# Patient Record
Sex: FEMALE | Race: WHITE | NOT HISPANIC OR LATINO | Employment: FULL TIME | URBAN - NONMETROPOLITAN AREA
[De-identification: names, ages, dates, MRNs, and addresses within clinical notes are randomized per-mention and may not be internally consistent; named-entity substitution may affect disease eponyms.]

---

## 2017-01-04 ENCOUNTER — APPOINTMENT (OUTPATIENT)
Dept: LAB | Facility: MEDICAL CENTER | Age: 32
End: 2017-01-04
Payer: COMMERCIAL

## 2017-01-04 ENCOUNTER — TRANSCRIBE ORDERS (OUTPATIENT)
Dept: LAB | Facility: MEDICAL CENTER | Age: 32
End: 2017-01-04

## 2017-01-04 ENCOUNTER — HOSPITAL ENCOUNTER (OUTPATIENT)
Dept: RADIOLOGY | Facility: MEDICAL CENTER | Age: 32
Discharge: HOME | End: 2017-01-04
Payer: COMMERCIAL

## 2017-01-04 ENCOUNTER — TRANSCRIBE ORDERS (OUTPATIENT)
Dept: RADIOLOGY | Facility: MEDICAL CENTER | Age: 32
End: 2017-01-04

## 2017-01-04 DIAGNOSIS — E11.21 DIABETIC GLOMERULOPATHY (HCC): ICD-10-CM

## 2017-01-04 DIAGNOSIS — R10.13 DYSPEPSIA: ICD-10-CM

## 2017-01-04 DIAGNOSIS — E78.5 HYPERLIPIDEMIA, UNSPECIFIED HYPERLIPIDEMIA TYPE: ICD-10-CM

## 2017-01-04 DIAGNOSIS — Z98.890 HISTORY OF ESOPHAGOGASTRODUODENOSCOPY (EGD): Primary | ICD-10-CM

## 2017-01-04 DIAGNOSIS — K75.81 NONALCOHOLIC STEATOHEPATITIS (NASH): ICD-10-CM

## 2017-01-04 DIAGNOSIS — G43.909 MIGRAINE WITHOUT STATUS MIGRAINOSUS, NOT INTRACTABLE: ICD-10-CM

## 2017-01-04 DIAGNOSIS — D35.2 BENIGN NEOPLASM OF PITUITARY GLAND AND CRANIOPHARYNGEAL DUCT (POUCH) (HCC): ICD-10-CM

## 2017-01-04 DIAGNOSIS — D35.3 BENIGN NEOPLASM OF PITUITARY GLAND AND CRANIOPHARYNGEAL DUCT (POUCH) (HCC): ICD-10-CM

## 2017-01-04 DIAGNOSIS — Z79.899 OTHER LONG TERM (CURRENT) DRUG THERAPY: ICD-10-CM

## 2017-01-04 DIAGNOSIS — R10.13 DYSPEPSIA: Primary | ICD-10-CM

## 2017-01-04 DIAGNOSIS — E66.9 OBESITY: ICD-10-CM

## 2017-01-04 DIAGNOSIS — Z98.890 HISTORY OF ESOPHAGOGASTRODUODENOSCOPY (EGD): ICD-10-CM

## 2017-01-04 DIAGNOSIS — Z79.1 LONG TERM CURRENT USE OF NON-STEROIDAL ANTI-INFLAMMATORIES (NSAID): ICD-10-CM

## 2017-01-04 DIAGNOSIS — E03.9 HYPOTHYROIDISM: ICD-10-CM

## 2017-01-04 DIAGNOSIS — F33.2 MAJOR DEPRESSIVE DISORDER, RECURRENT SEVERE WITHOUT PSYCHOTIC FEATURES (HCC): ICD-10-CM

## 2017-01-04 DIAGNOSIS — M06.09 RHEUMATOID ARTHRITIS OF MULTIPLE SITES WITHOUT RHEUMATOID FACTOR (HCC): ICD-10-CM

## 2017-01-04 LAB
ALBUMIN SERPL BCP-MCNC: 3.9 G/DL (ref 3.5–5)
ALP SERPL-CCNC: 87 U/L (ref 46–116)
ALT SERPL W P-5'-P-CCNC: 296 U/L (ref 12–78)
ANION GAP SERPL CALCULATED.3IONS-SCNC: 8 MMOL/L (ref 4–13)
APTT PPP: 27 SECONDS (ref 24–36)
AST SERPL W P-5'-P-CCNC: 227 U/L (ref 5–45)
BASOPHILS # BLD AUTO: 0.04 THOUSANDS/ΜL (ref 0–0.1)
BASOPHILS NFR BLD AUTO: 0 % (ref 0–1)
BILIRUB SERPL-MCNC: 0.58 MG/DL (ref 0.2–1)
BUN SERPL-MCNC: 6 MG/DL (ref 5–25)
CALCIUM SERPL-MCNC: 9.7 MG/DL (ref 8.3–10.1)
CHLORIDE SERPL-SCNC: 105 MMOL/L (ref 100–108)
CHOLEST SERPL-MCNC: 258 MG/DL (ref 50–200)
CO2 SERPL-SCNC: 25 MMOL/L (ref 21–32)
CREAT SERPL-MCNC: 0.97 MG/DL (ref 0.6–1.3)
CREAT UR-MCNC: 308 MG/DL
CRP SERPL QL: 9.9 MG/L
EOSINOPHIL # BLD AUTO: 0.29 THOUSAND/ΜL (ref 0–0.61)
EOSINOPHIL NFR BLD AUTO: 3 % (ref 0–6)
ERYTHROCYTE [DISTWIDTH] IN BLOOD BY AUTOMATED COUNT: 13.5 % (ref 11.6–15.1)
ERYTHROCYTE [SEDIMENTATION RATE] IN BLOOD: 16 MM/HOUR (ref 0–20)
EST. AVERAGE GLUCOSE BLD GHB EST-MCNC: 120 MG/DL
GFR SERPL CREATININE-BSD FRML MDRD: >60 ML/MIN/1.73SQ M
GLUCOSE SERPL-MCNC: 122 MG/DL (ref 65–140)
HBA1C MFR BLD: 5.8 % (ref 4.2–6.3)
HCG SERPL QL: NEGATIVE
HCT VFR BLD AUTO: 46.6 % (ref 34.8–46.1)
HDLC SERPL-MCNC: 26 MG/DL (ref 40–60)
HGB BLD-MCNC: 15.8 G/DL (ref 11.5–15.4)
INR PPP: 1.03 (ref 0.86–1.16)
LDLC SERPL CALC-MCNC: 154 MG/DL (ref 0–100)
LYMPHOCYTES # BLD AUTO: 4.43 THOUSANDS/ΜL (ref 0.6–4.47)
LYMPHOCYTES NFR BLD AUTO: 38 % (ref 14–44)
MCH RBC QN AUTO: 31.3 PG (ref 26.8–34.3)
MCHC RBC AUTO-ENTMCNC: 33.9 G/DL (ref 31.4–37.4)
MCV RBC AUTO: 92 FL (ref 82–98)
MICROALBUMIN UR-MCNC: 15.1 MG/L (ref 0–20)
MICROALBUMIN/CREAT 24H UR: 5 MG/G CREATININE (ref 0–30)
MONOCYTES # BLD AUTO: 0.99 THOUSAND/ΜL (ref 0.17–1.22)
MONOCYTES NFR BLD AUTO: 9 % (ref 4–12)
NEUTROPHILS # BLD AUTO: 5.86 THOUSANDS/ΜL (ref 1.85–7.62)
NEUTS SEG NFR BLD AUTO: 50 % (ref 43–75)
NRBC BLD AUTO-RTO: 0 /100 WBCS
PLATELET # BLD AUTO: 290 THOUSANDS/UL (ref 149–390)
PMV BLD AUTO: 11.7 FL (ref 8.9–12.7)
POTASSIUM SERPL-SCNC: 4.3 MMOL/L (ref 3.5–5.3)
PROLACTIN SERPL-MCNC: 21.4 NG/ML
PROT SERPL-MCNC: 8.2 G/DL (ref 6.4–8.2)
PROTHROMBIN TIME: 13.6 SECONDS (ref 12–14.3)
RBC # BLD AUTO: 5.05 MILLION/UL (ref 3.81–5.12)
SODIUM SERPL-SCNC: 138 MMOL/L (ref 136–145)
TRIGL SERPL-MCNC: 392 MG/DL
TSH SERPL DL<=0.05 MIU/L-ACNC: 3.79 UIU/ML (ref 0.36–3.74)
WBC # BLD AUTO: 11.66 THOUSAND/UL (ref 4.31–10.16)

## 2017-01-04 PROCEDURE — 80053 COMPREHEN METABOLIC PANEL: CPT

## 2017-01-04 PROCEDURE — 85652 RBC SED RATE AUTOMATED: CPT

## 2017-01-04 PROCEDURE — 82043 UR ALBUMIN QUANTITATIVE: CPT | Performed by: SURGERY

## 2017-01-04 PROCEDURE — 36415 COLL VENOUS BLD VENIPUNCTURE: CPT

## 2017-01-04 PROCEDURE — 83036 HEMOGLOBIN GLYCOSYLATED A1C: CPT

## 2017-01-04 PROCEDURE — 84443 ASSAY THYROID STIM HORMONE: CPT

## 2017-01-04 PROCEDURE — 84703 CHORIONIC GONADOTROPIN ASSAY: CPT

## 2017-01-04 PROCEDURE — 85610 PROTHROMBIN TIME: CPT

## 2017-01-04 PROCEDURE — 82570 ASSAY OF URINE CREATININE: CPT | Performed by: SURGERY

## 2017-01-04 PROCEDURE — 86140 C-REACTIVE PROTEIN: CPT

## 2017-01-04 PROCEDURE — 71020 HB CHEST X-RAY 2VW FRONTAL&LATL: CPT

## 2017-01-04 PROCEDURE — 85025 COMPLETE CBC W/AUTO DIFF WBC: CPT

## 2017-01-04 PROCEDURE — 84146 ASSAY OF PROLACTIN: CPT

## 2017-01-04 PROCEDURE — 80061 LIPID PANEL: CPT

## 2017-01-04 PROCEDURE — 85730 THROMBOPLASTIN TIME PARTIAL: CPT

## 2017-01-06 ENCOUNTER — HOSPITAL ENCOUNTER (OUTPATIENT)
Dept: ULTRASOUND IMAGING | Facility: HOSPITAL | Age: 32
Discharge: HOME | End: 2017-01-06
Payer: COMMERCIAL

## 2017-01-06 ENCOUNTER — TRANSCRIBE ORDERS (OUTPATIENT)
Dept: ADMINISTRATIVE | Facility: HOSPITAL | Age: 32
End: 2017-01-06

## 2017-01-06 ENCOUNTER — HOSPITAL ENCOUNTER (OUTPATIENT)
Dept: NON INVASIVE DIAGNOSTICS | Facility: HOSPITAL | Age: 32
Discharge: HOME | End: 2017-01-06
Payer: COMMERCIAL

## 2017-01-06 DIAGNOSIS — R10.13 EPIGASTRIC PAIN: ICD-10-CM

## 2017-01-06 DIAGNOSIS — Z01.818 PREOP EXAMINATION: Primary | ICD-10-CM

## 2017-01-06 DIAGNOSIS — Z01.818 PREOP EXAMINATION: ICD-10-CM

## 2017-01-06 LAB
ATRIAL RATE: 106 BPM
P AXIS: 51 DEGREES
PR INTERVAL: 142 MS
QRS AXIS: 51 DEGREES
QRSD INTERVAL: 86 MS
QT INTERVAL: 362 MS
QTC INTERVAL: 480 MS
T WAVE AXIS: 14 DEGREES
VENTRICULAR RATE: 106 BPM

## 2017-01-06 PROCEDURE — 76705 ECHO EXAM OF ABDOMEN: CPT

## 2017-01-06 PROCEDURE — 93005 ELECTROCARDIOGRAM TRACING: CPT

## 2017-01-13 ENCOUNTER — ALLSCRIPTS OFFICE VISIT (OUTPATIENT)
Dept: OTHER | Facility: OTHER | Age: 32
End: 2017-01-13

## 2017-01-18 ENCOUNTER — ALLSCRIPTS OFFICE VISIT (OUTPATIENT)
Dept: FAMILY MEDICINE CLINIC | Facility: CLINIC | Age: 32
End: 2017-01-18
Payer: COMMERCIAL

## 2017-01-18 PROCEDURE — 90791 PSYCH DIAGNOSTIC EVALUATION: CPT | Performed by: SOCIAL WORKER

## 2017-03-15 ENCOUNTER — ALLSCRIPTS OFFICE VISIT (OUTPATIENT)
Dept: FAMILY MEDICINE CLINIC | Facility: CLINIC | Age: 32
End: 2017-03-15
Payer: COMMERCIAL

## 2017-03-15 PROCEDURE — 90834 PSYTX W PT 45 MINUTES: CPT | Performed by: SOCIAL WORKER

## 2017-03-17 ENCOUNTER — APPOINTMENT (OUTPATIENT)
Dept: LAB | Facility: HOSPITAL | Age: 32
End: 2017-03-17
Payer: COMMERCIAL

## 2017-03-17 ENCOUNTER — TRANSCRIBE ORDERS (OUTPATIENT)
Dept: ADMINISTRATIVE | Facility: HOSPITAL | Age: 32
End: 2017-03-17

## 2017-03-17 DIAGNOSIS — Z01.818 OTHER SPECIFIED PRE-OPERATIVE EXAMINATION: ICD-10-CM

## 2017-03-17 DIAGNOSIS — E66.01 MORBID OBESITY, UNSPECIFIED OBESITY TYPE (HCC): ICD-10-CM

## 2017-03-17 DIAGNOSIS — E66.01 MORBID OBESITY, UNSPECIFIED OBESITY TYPE (HCC): Primary | ICD-10-CM

## 2017-03-17 LAB
ALBUMIN SERPL BCP-MCNC: 4.2 G/DL (ref 3.5–5)
ALP SERPL-CCNC: 67 U/L (ref 46–116)
ALT SERPL W P-5'-P-CCNC: 45 U/L (ref 12–78)
ANION GAP SERPL CALCULATED.3IONS-SCNC: 11 MMOL/L (ref 4–13)
APTT PPP: 31 SECONDS (ref 24–36)
AST SERPL W P-5'-P-CCNC: 19 U/L (ref 5–45)
B-HCG SERPL-ACNC: <2 MIU/ML
BASOPHILS # BLD AUTO: 0.03 THOUSANDS/ΜL (ref 0–0.1)
BASOPHILS NFR BLD AUTO: 0 % (ref 0–1)
BILIRUB SERPL-MCNC: 0.7 MG/DL (ref 0.2–1)
BUN SERPL-MCNC: 9 MG/DL (ref 5–25)
CALCIUM SERPL-MCNC: 9.3 MG/DL (ref 8.3–10.1)
CHLORIDE SERPL-SCNC: 101 MMOL/L (ref 100–108)
CO2 SERPL-SCNC: 26 MMOL/L (ref 21–32)
CREAT SERPL-MCNC: 1.07 MG/DL (ref 0.6–1.3)
EOSINOPHIL # BLD AUTO: 0.17 THOUSAND/ΜL (ref 0–0.61)
EOSINOPHIL NFR BLD AUTO: 1 % (ref 0–6)
ERYTHROCYTE [DISTWIDTH] IN BLOOD BY AUTOMATED COUNT: 13.3 % (ref 11.6–15.1)
GFR SERPL CREATININE-BSD FRML MDRD: 59.4 ML/MIN/1.73SQ M
GLUCOSE P FAST SERPL-MCNC: 105 MG/DL (ref 65–99)
HCT VFR BLD AUTO: 44.3 % (ref 34.8–46.1)
HGB BLD-MCNC: 14.8 G/DL (ref 11.5–15.4)
INR PPP: 1.17 (ref 0.86–1.16)
LYMPHOCYTES # BLD AUTO: 4.42 THOUSANDS/ΜL (ref 0.6–4.47)
LYMPHOCYTES NFR BLD AUTO: 37 % (ref 14–44)
MCH RBC QN AUTO: 30.5 PG (ref 26.8–34.3)
MCHC RBC AUTO-ENTMCNC: 33.4 G/DL (ref 31.4–37.4)
MCV RBC AUTO: 91 FL (ref 82–98)
MONOCYTES # BLD AUTO: 1.06 THOUSAND/ΜL (ref 0.17–1.22)
MONOCYTES NFR BLD AUTO: 9 % (ref 4–12)
NEUTROPHILS # BLD AUTO: 6.33 THOUSANDS/ΜL (ref 1.85–7.62)
NEUTS SEG NFR BLD AUTO: 53 % (ref 43–75)
PLATELET # BLD AUTO: 304 THOUSANDS/UL (ref 149–390)
PMV BLD AUTO: 10.9 FL (ref 8.9–12.7)
POTASSIUM SERPL-SCNC: 4 MMOL/L (ref 3.5–5.3)
PROT SERPL-MCNC: 8.1 G/DL (ref 6.4–8.2)
PROTHROMBIN TIME: 14.5 SECONDS (ref 12–14.3)
RBC # BLD AUTO: 4.86 MILLION/UL (ref 3.81–5.12)
SODIUM SERPL-SCNC: 138 MMOL/L (ref 136–145)
WBC # BLD AUTO: 12.01 THOUSAND/UL (ref 4.31–10.16)

## 2017-03-17 PROCEDURE — 36415 COLL VENOUS BLD VENIPUNCTURE: CPT

## 2017-03-17 PROCEDURE — 85730 THROMBOPLASTIN TIME PARTIAL: CPT

## 2017-03-17 PROCEDURE — 85025 COMPLETE CBC W/AUTO DIFF WBC: CPT

## 2017-03-17 PROCEDURE — 85610 PROTHROMBIN TIME: CPT

## 2017-03-17 PROCEDURE — 84702 CHORIONIC GONADOTROPIN TEST: CPT

## 2017-03-17 PROCEDURE — 80053 COMPREHEN METABOLIC PANEL: CPT

## 2017-03-30 DIAGNOSIS — Z79.1 LONG TERM CURRENT USE OF NON-STEROIDAL ANTI-INFLAMMATORIES (NSAID): ICD-10-CM

## 2017-03-30 DIAGNOSIS — M06.09 RHEUMATOID ARTHRITIS OF MULTIPLE SITES WITHOUT RHEUMATOID FACTOR (HCC): ICD-10-CM

## 2017-03-30 DIAGNOSIS — Z79.899 OTHER LONG TERM (CURRENT) DRUG THERAPY: ICD-10-CM

## 2017-03-30 DIAGNOSIS — T17.908A UNSPECIFIED FOREIGN BODY IN RESPIRATORY TRACT, PART UNSPECIFIED CAUSING OTHER INJURY, INITIAL ENCOUNTER: ICD-10-CM

## 2017-04-17 ENCOUNTER — HOSPITAL ENCOUNTER (OUTPATIENT)
Dept: RADIOLOGY | Facility: HOSPITAL | Age: 32
Discharge: HOME | End: 2017-04-17
Attending: FAMILY MEDICINE
Payer: COMMERCIAL

## 2017-04-17 ENCOUNTER — ALLSCRIPTS OFFICE VISIT (OUTPATIENT)
Dept: OTHER | Facility: OTHER | Age: 32
End: 2017-04-17

## 2017-04-17 ENCOUNTER — TRANSCRIBE ORDERS (OUTPATIENT)
Dept: ADMINISTRATIVE | Facility: HOSPITAL | Age: 32
End: 2017-04-17

## 2017-04-17 DIAGNOSIS — T17.908A UNSPECIFIED FOREIGN BODY IN RESPIRATORY TRACT, PART UNSPECIFIED CAUSING OTHER INJURY, INITIAL ENCOUNTER: ICD-10-CM

## 2017-04-17 PROCEDURE — 71020 HB CHEST X-RAY 2VW FRONTAL&LATL: CPT

## 2017-04-18 ENCOUNTER — HOSPITAL ENCOUNTER (EMERGENCY)
Facility: HOSPITAL | Age: 32
Discharge: HOME | End: 2017-04-18
Payer: COMMERCIAL

## 2017-04-18 ENCOUNTER — GENERIC CONVERSION - ENCOUNTER (OUTPATIENT)
Dept: OTHER | Facility: OTHER | Age: 32
End: 2017-04-18

## 2017-04-18 ENCOUNTER — APPOINTMENT (EMERGENCY)
Dept: RADIOLOGY | Facility: HOSPITAL | Age: 32
End: 2017-04-18
Payer: COMMERCIAL

## 2017-04-18 VITALS
SYSTOLIC BLOOD PRESSURE: 122 MMHG | RESPIRATION RATE: 18 BRPM | TEMPERATURE: 98.1 F | HEART RATE: 88 BPM | HEIGHT: 65 IN | DIASTOLIC BLOOD PRESSURE: 88 MMHG | WEIGHT: 234.57 LBS | OXYGEN SATURATION: 98 % | BODY MASS INDEX: 39.08 KG/M2

## 2017-04-18 DIAGNOSIS — J69.0 ASPIRATION PNEUMONITIS (HCC): ICD-10-CM

## 2017-04-18 DIAGNOSIS — Z98.84 STATUS POST BARIATRIC SURGERY: ICD-10-CM

## 2017-04-18 DIAGNOSIS — K21.9 ACID REFLUX: Primary | ICD-10-CM

## 2017-04-18 LAB
ALBUMIN SERPL BCP-MCNC: 4.1 G/DL (ref 3.5–5)
ALP SERPL-CCNC: 61 U/L (ref 46–116)
ALT SERPL W P-5'-P-CCNC: 55 U/L (ref 12–78)
ANION GAP SERPL CALCULATED.3IONS-SCNC: 12 MMOL/L (ref 4–13)
AST SERPL W P-5'-P-CCNC: 27 U/L (ref 5–45)
BASOPHILS # BLD AUTO: 0.02 THOUSANDS/ΜL (ref 0–0.1)
BASOPHILS NFR BLD AUTO: 0 % (ref 0–1)
BILIRUB SERPL-MCNC: 0.5 MG/DL (ref 0.2–1)
BUN SERPL-MCNC: 8 MG/DL (ref 5–25)
CALCIUM SERPL-MCNC: 9.4 MG/DL (ref 8.3–10.1)
CHLORIDE SERPL-SCNC: 105 MMOL/L (ref 100–108)
CO2 SERPL-SCNC: 25 MMOL/L (ref 21–32)
CREAT SERPL-MCNC: 0.88 MG/DL (ref 0.6–1.3)
EOSINOPHIL # BLD AUTO: 0.13 THOUSAND/ΜL (ref 0–0.61)
EOSINOPHIL NFR BLD AUTO: 2 % (ref 0–6)
ERYTHROCYTE [DISTWIDTH] IN BLOOD BY AUTOMATED COUNT: 14.6 % (ref 11.6–15.1)
GFR SERPL CREATININE-BSD FRML MDRD: >60 ML/MIN/1.73SQ M
GLUCOSE SERPL-MCNC: 93 MG/DL (ref 65–140)
HCT VFR BLD AUTO: 46.9 % (ref 34.8–46.1)
HGB BLD-MCNC: 15.6 G/DL (ref 11.5–15.4)
HOLD SPECIMEN: YES
LYMPHOCYTES # BLD AUTO: 1.93 THOUSANDS/ΜL (ref 0.6–4.47)
LYMPHOCYTES NFR BLD AUTO: 26 % (ref 14–44)
MCH RBC QN AUTO: 30.3 PG (ref 26.8–34.3)
MCHC RBC AUTO-ENTMCNC: 33.3 G/DL (ref 31.4–37.4)
MCV RBC AUTO: 91 FL (ref 82–98)
MONOCYTES # BLD AUTO: 1.15 THOUSAND/ΜL (ref 0.17–1.22)
MONOCYTES NFR BLD AUTO: 15 % (ref 4–12)
NEUTROPHILS # BLD AUTO: 4.33 THOUSANDS/ΜL (ref 1.85–7.62)
NEUTS SEG NFR BLD AUTO: 57 % (ref 43–75)
PLATELET # BLD AUTO: 229 THOUSANDS/UL (ref 149–390)
PMV BLD AUTO: 11.9 FL (ref 8.9–12.7)
POTASSIUM SERPL-SCNC: 3.3 MMOL/L (ref 3.5–5.3)
PROT SERPL-MCNC: 8.8 G/DL (ref 6.4–8.2)
RBC # BLD AUTO: 5.15 MILLION/UL (ref 3.81–5.12)
SODIUM SERPL-SCNC: 142 MMOL/L (ref 136–145)
TROPONIN I SERPL-MCNC: <0.02 NG/ML
WBC # BLD AUTO: 7.56 THOUSAND/UL (ref 4.31–10.16)

## 2017-04-18 PROCEDURE — 85025 COMPLETE CBC W/AUTO DIFF WBC: CPT

## 2017-04-18 PROCEDURE — 93005 ELECTROCARDIOGRAM TRACING: CPT

## 2017-04-18 PROCEDURE — 99285 EMERGENCY DEPT VISIT HI MDM: CPT

## 2017-04-18 PROCEDURE — 84484 ASSAY OF TROPONIN QUANT: CPT

## 2017-04-18 PROCEDURE — 80053 COMPREHEN METABOLIC PANEL: CPT

## 2017-04-18 PROCEDURE — 36415 COLL VENOUS BLD VENIPUNCTURE: CPT

## 2017-04-18 RX ORDER — BENZONATATE 200 MG/1
200 CAPSULE ORAL 3 TIMES DAILY PRN
Qty: 21 CAPSULE | Refills: 0 | Status: SHIPPED | OUTPATIENT
Start: 2017-04-18 | End: 2017-04-25

## 2017-04-18 RX ORDER — FAMOTIDINE 20 MG/1
20 TABLET, FILM COATED ORAL 2 TIMES DAILY
Qty: 60 TABLET | Refills: 0 | Status: SHIPPED | OUTPATIENT
Start: 2017-04-18 | End: 2018-05-01

## 2017-04-18 RX ORDER — TIZANIDINE HYDROCHLORIDE 4 MG/1
4 CAPSULE, GELATIN COATED ORAL 3 TIMES DAILY
COMMUNITY
End: 2018-05-01

## 2017-04-20 LAB
ATRIAL RATE: 104 BPM
P AXIS: 26 DEGREES
PR INTERVAL: 134 MS
QRS AXIS: 0 DEGREES
QRSD INTERVAL: 82 MS
QT INTERVAL: 350 MS
QTC INTERVAL: 460 MS
T WAVE AXIS: -2 DEGREES
VENTRICULAR RATE: 104 BPM

## 2017-05-03 ENCOUNTER — TRANSCRIBE ORDERS (OUTPATIENT)
Dept: ADMINISTRATIVE | Facility: HOSPITAL | Age: 32
End: 2017-05-03

## 2017-05-03 ENCOUNTER — APPOINTMENT (OUTPATIENT)
Dept: LAB | Facility: HOSPITAL | Age: 32
End: 2017-05-03
Payer: COMMERCIAL

## 2017-05-03 DIAGNOSIS — E11.8 UNCONTROLLED TYPE 2 DIABETES MELLITUS WITH COMPLICATION, UNSPECIFIED LONG TERM INSULIN USE STATUS: ICD-10-CM

## 2017-05-03 DIAGNOSIS — E53.8 OTHER B-COMPLEX DEFICIENCIES: ICD-10-CM

## 2017-05-03 DIAGNOSIS — E51.9 OTHER AND UNSPECIFIED MANIFESTATIONS OF THIAMINE DEFICIENCY: ICD-10-CM

## 2017-05-03 DIAGNOSIS — E78.5 HYPERLIPIDEMIA, UNSPECIFIED HYPERLIPIDEMIA TYPE: ICD-10-CM

## 2017-05-03 DIAGNOSIS — E50.9: ICD-10-CM

## 2017-05-03 DIAGNOSIS — Z79.1 LONG TERM CURRENT USE OF NON-STEROIDAL ANTI-INFLAMMATORIES (NSAID): ICD-10-CM

## 2017-05-03 DIAGNOSIS — D64.9 ANEMIA, UNSPECIFIED: ICD-10-CM

## 2017-05-03 DIAGNOSIS — E11.65 UNCONTROLLED TYPE 2 DIABETES MELLITUS WITH COMPLICATION, UNSPECIFIED LONG TERM INSULIN USE STATUS: ICD-10-CM

## 2017-05-03 DIAGNOSIS — E55.9 VITAMIN D DEFICIENCY DISEASE: ICD-10-CM

## 2017-05-03 DIAGNOSIS — E66.01 MORBID OBESITY, UNSPECIFIED OBESITY TYPE (HCC): Primary | ICD-10-CM

## 2017-05-03 DIAGNOSIS — M06.09 RHEUMATOID ARTHRITIS OF MULTIPLE SITES WITHOUT RHEUMATOID FACTOR (HCC): ICD-10-CM

## 2017-05-03 DIAGNOSIS — Z79.899 OTHER LONG TERM (CURRENT) DRUG THERAPY: ICD-10-CM

## 2017-05-03 DIAGNOSIS — E58 CALCIUM DEFICIENCY: ICD-10-CM

## 2017-05-03 DIAGNOSIS — D53.9 UNSPECIFIED DEFICIENCY ANEMIA: ICD-10-CM

## 2017-05-03 DIAGNOSIS — E66.01 MORBID OBESITY, UNSPECIFIED OBESITY TYPE (HCC): ICD-10-CM

## 2017-05-03 LAB
25(OH)D3 SERPL-MCNC: 18.8 NG/ML (ref 30–100)
ALBUMIN SERPL BCP-MCNC: 3.9 G/DL (ref 3.5–5)
ALP SERPL-CCNC: 56 U/L (ref 46–116)
ALT SERPL W P-5'-P-CCNC: 73 U/L (ref 12–78)
ANION GAP SERPL CALCULATED.3IONS-SCNC: 14 MMOL/L (ref 4–13)
AST SERPL W P-5'-P-CCNC: 27 U/L (ref 5–45)
BASOPHILS # BLD AUTO: 0.03 THOUSANDS/ΜL (ref 0–0.1)
BASOPHILS NFR BLD AUTO: 0 % (ref 0–1)
BILIRUB SERPL-MCNC: 0.8 MG/DL (ref 0.2–1)
BUN SERPL-MCNC: 9 MG/DL (ref 5–25)
CALCIUM SERPL-MCNC: 9.1 MG/DL (ref 8.3–10.1)
CHLORIDE SERPL-SCNC: 104 MMOL/L (ref 100–108)
CHOLEST SERPL-MCNC: 220 MG/DL (ref 50–200)
CO2 SERPL-SCNC: 24 MMOL/L (ref 21–32)
CREAT SERPL-MCNC: 0.85 MG/DL (ref 0.6–1.3)
CRP SERPL QL: 5 MG/L
EOSINOPHIL # BLD AUTO: 0.25 THOUSAND/ΜL (ref 0–0.61)
EOSINOPHIL NFR BLD AUTO: 3 % (ref 0–6)
ERYTHROCYTE [DISTWIDTH] IN BLOOD BY AUTOMATED COUNT: 14.7 % (ref 11.6–15.1)
ERYTHROCYTE [SEDIMENTATION RATE] IN BLOOD: 30 MM/HOUR (ref 0–20)
EST. AVERAGE GLUCOSE BLD GHB EST-MCNC: 100 MG/DL
GFR SERPL CREATININE-BSD FRML MDRD: >60 ML/MIN/1.73SQ M
GLUCOSE P FAST SERPL-MCNC: 81 MG/DL (ref 65–99)
HBA1C MFR BLD: 5.1 % (ref 4.2–6.3)
HCT VFR BLD AUTO: 41 % (ref 34.8–46.1)
HDLC SERPL-MCNC: 30 MG/DL (ref 40–60)
HGB BLD-MCNC: 13.8 G/DL (ref 11.5–15.4)
IRON SERPL-MCNC: 86 UG/DL (ref 50–170)
LDLC SERPL CALC-MCNC: 148 MG/DL (ref 0–100)
LYMPHOCYTES # BLD AUTO: 3.81 THOUSANDS/ΜL (ref 0.6–4.47)
LYMPHOCYTES NFR BLD AUTO: 41 % (ref 14–44)
MCH RBC QN AUTO: 30.3 PG (ref 26.8–34.3)
MCHC RBC AUTO-ENTMCNC: 33.7 G/DL (ref 31.4–37.4)
MCV RBC AUTO: 90 FL (ref 82–98)
MONOCYTES # BLD AUTO: 0.68 THOUSAND/ΜL (ref 0.17–1.22)
MONOCYTES NFR BLD AUTO: 7 % (ref 4–12)
NEUTROPHILS # BLD AUTO: 4.46 THOUSANDS/ΜL (ref 1.85–7.62)
NEUTS SEG NFR BLD AUTO: 49 % (ref 43–75)
PLATELET # BLD AUTO: 255 THOUSANDS/UL (ref 149–390)
PMV BLD AUTO: 12.2 FL (ref 8.9–12.7)
POTASSIUM SERPL-SCNC: 3.3 MMOL/L (ref 3.5–5.3)
PROT SERPL-MCNC: 7.5 G/DL (ref 6.4–8.2)
RBC # BLD AUTO: 4.55 MILLION/UL (ref 3.81–5.12)
SODIUM SERPL-SCNC: 142 MMOL/L (ref 136–145)
TRIGL SERPL-MCNC: 209 MG/DL
VIT B12 SERPL-MCNC: 356 PG/ML (ref 100–900)
WBC # BLD AUTO: 9.23 THOUSAND/UL (ref 4.31–10.16)

## 2017-05-03 PROCEDURE — 80053 COMPREHEN METABOLIC PANEL: CPT

## 2017-05-03 PROCEDURE — 83540 ASSAY OF IRON: CPT

## 2017-05-03 PROCEDURE — 85025 COMPLETE CBC W/AUTO DIFF WBC: CPT

## 2017-05-03 PROCEDURE — 82306 VITAMIN D 25 HYDROXY: CPT

## 2017-05-03 PROCEDURE — 80061 LIPID PANEL: CPT

## 2017-05-03 PROCEDURE — 84590 ASSAY OF VITAMIN A: CPT

## 2017-05-03 PROCEDURE — 86140 C-REACTIVE PROTEIN: CPT

## 2017-05-03 PROCEDURE — 83036 HEMOGLOBIN GLYCOSYLATED A1C: CPT

## 2017-05-03 PROCEDURE — 82607 VITAMIN B-12: CPT

## 2017-05-03 PROCEDURE — 36415 COLL VENOUS BLD VENIPUNCTURE: CPT

## 2017-05-03 PROCEDURE — 85652 RBC SED RATE AUTOMATED: CPT

## 2017-05-03 PROCEDURE — 84425 ASSAY OF VITAMIN B-1: CPT

## 2017-05-06 LAB — VIT B1 BLD-SCNC: 112.8 NMOL/L (ref 66.5–200)

## 2017-05-07 LAB — VIT A SERPL-MCNC: 58 UG/DL (ref 20–65)

## 2017-05-09 ENCOUNTER — GENERIC CONVERSION - ENCOUNTER (OUTPATIENT)
Dept: OTHER | Facility: OTHER | Age: 32
End: 2017-05-09

## 2017-05-10 ENCOUNTER — ALLSCRIPTS OFFICE VISIT (OUTPATIENT)
Dept: FAMILY MEDICINE CLINIC | Facility: CLINIC | Age: 32
End: 2017-05-10
Payer: COMMERCIAL

## 2017-05-10 PROCEDURE — 90837 PSYTX W PT 60 MINUTES: CPT | Performed by: SOCIAL WORKER

## 2017-05-12 ENCOUNTER — ALLSCRIPTS OFFICE VISIT (OUTPATIENT)
Dept: OTHER | Facility: OTHER | Age: 32
End: 2017-05-12

## 2017-05-12 DIAGNOSIS — Z79.1 LONG TERM CURRENT USE OF NON-STEROIDAL ANTI-INFLAMMATORIES (NSAID): ICD-10-CM

## 2017-05-12 DIAGNOSIS — M06.09 RHEUMATOID ARTHRITIS OF MULTIPLE SITES WITHOUT RHEUMATOID FACTOR (HCC): ICD-10-CM

## 2017-06-20 ENCOUNTER — GENERIC CONVERSION - ENCOUNTER (OUTPATIENT)
Dept: OTHER | Facility: OTHER | Age: 32
End: 2017-06-20

## 2017-06-21 DIAGNOSIS — E11.21 TYPE 2 DIABETES MELLITUS WITH DIABETIC NEPHROPATHY (HCC): ICD-10-CM

## 2017-06-21 DIAGNOSIS — E78.5 HYPERLIPIDEMIA: ICD-10-CM

## 2017-06-21 DIAGNOSIS — Z00.00 ENCOUNTER FOR GENERAL ADULT MEDICAL EXAMINATION WITHOUT ABNORMAL FINDINGS: ICD-10-CM

## 2017-06-21 DIAGNOSIS — M06.09 RHEUMATOID ARTHRITIS OF MULTIPLE SITES WITHOUT RHEUMATOID FACTOR (HCC): ICD-10-CM

## 2017-06-24 ENCOUNTER — TRANSCRIBE ORDERS (OUTPATIENT)
Dept: ADMINISTRATIVE | Facility: HOSPITAL | Age: 32
End: 2017-06-24

## 2017-06-24 ENCOUNTER — APPOINTMENT (OUTPATIENT)
Dept: LAB | Facility: HOSPITAL | Age: 32
End: 2017-06-24
Attending: FAMILY MEDICINE
Payer: COMMERCIAL

## 2017-06-24 DIAGNOSIS — Z00.00 ENCOUNTER FOR GENERAL ADULT MEDICAL EXAMINATION WITHOUT ABNORMAL FINDINGS: ICD-10-CM

## 2017-06-24 DIAGNOSIS — E11.21 TYPE 2 DIABETES MELLITUS WITH DIABETIC NEPHROPATHY (HCC): ICD-10-CM

## 2017-06-24 DIAGNOSIS — E78.5 HYPERLIPIDEMIA: ICD-10-CM

## 2017-06-24 DIAGNOSIS — M06.09 RHEUMATOID ARTHRITIS OF MULTIPLE SITES WITHOUT RHEUMATOID FACTOR (HCC): ICD-10-CM

## 2017-06-24 LAB
ALBUMIN SERPL BCP-MCNC: 4 G/DL (ref 3.5–5)
ALP SERPL-CCNC: 61 U/L (ref 46–116)
ALT SERPL W P-5'-P-CCNC: 31 U/L (ref 12–78)
ANION GAP SERPL CALCULATED.3IONS-SCNC: 12 MMOL/L (ref 4–13)
AST SERPL W P-5'-P-CCNC: 19 U/L (ref 5–45)
BASOPHILS # BLD AUTO: 0.04 THOUSANDS/ΜL (ref 0–0.1)
BASOPHILS NFR BLD AUTO: 1 % (ref 0–1)
BILIRUB SERPL-MCNC: 0.6 MG/DL (ref 0.2–1)
BUN SERPL-MCNC: 7 MG/DL (ref 5–25)
CALCIUM SERPL-MCNC: 9.3 MG/DL (ref 8.3–10.1)
CHLORIDE SERPL-SCNC: 104 MMOL/L (ref 100–108)
CHOLEST SERPL-MCNC: 222 MG/DL (ref 50–200)
CO2 SERPL-SCNC: 24 MMOL/L (ref 21–32)
CREAT SERPL-MCNC: 0.85 MG/DL (ref 0.6–1.3)
CRP SERPL QL: 7.3 MG/L
EOSINOPHIL # BLD AUTO: 0.12 THOUSAND/ΜL (ref 0–0.61)
EOSINOPHIL NFR BLD AUTO: 1 % (ref 0–6)
ERYTHROCYTE [DISTWIDTH] IN BLOOD BY AUTOMATED COUNT: 13.7 % (ref 11.6–15.1)
ERYTHROCYTE [SEDIMENTATION RATE] IN BLOOD: 23 MM/HOUR (ref 0–20)
EST. AVERAGE GLUCOSE BLD GHB EST-MCNC: 88 MG/DL
GFR SERPL CREATININE-BSD FRML MDRD: >60 ML/MIN/1.73SQ M
GLUCOSE P FAST SERPL-MCNC: 82 MG/DL (ref 65–99)
HBA1C MFR BLD: 4.7 % (ref 4.2–6.3)
HCT VFR BLD AUTO: 42.6 % (ref 34.8–46.1)
HDLC SERPL-MCNC: 31 MG/DL (ref 40–60)
HGB BLD-MCNC: 14.1 G/DL (ref 11.5–15.4)
LDLC SERPL CALC-MCNC: 154 MG/DL (ref 0–100)
LYMPHOCYTES # BLD AUTO: 3.66 THOUSANDS/ΜL (ref 0.6–4.47)
LYMPHOCYTES NFR BLD AUTO: 42 % (ref 14–44)
MCH RBC QN AUTO: 30 PG (ref 26.8–34.3)
MCHC RBC AUTO-ENTMCNC: 33.1 G/DL (ref 31.4–37.4)
MCV RBC AUTO: 91 FL (ref 82–98)
MONOCYTES # BLD AUTO: 0.64 THOUSAND/ΜL (ref 0.17–1.22)
MONOCYTES NFR BLD AUTO: 7 % (ref 4–12)
NEUTROPHILS # BLD AUTO: 4.17 THOUSANDS/ΜL (ref 1.85–7.62)
NEUTS SEG NFR BLD AUTO: 49 % (ref 43–75)
PLATELET # BLD AUTO: 246 THOUSANDS/UL (ref 149–390)
PMV BLD AUTO: 12.2 FL (ref 8.9–12.7)
POTASSIUM SERPL-SCNC: 4.1 MMOL/L (ref 3.5–5.3)
PROT SERPL-MCNC: 7.9 G/DL (ref 6.4–8.2)
RBC # BLD AUTO: 4.7 MILLION/UL (ref 3.81–5.12)
SODIUM SERPL-SCNC: 140 MMOL/L (ref 136–145)
TRIGL SERPL-MCNC: 183 MG/DL
WBC # BLD AUTO: 8.63 THOUSAND/UL (ref 4.31–10.16)

## 2017-06-24 PROCEDURE — 86140 C-REACTIVE PROTEIN: CPT

## 2017-06-24 PROCEDURE — 36415 COLL VENOUS BLD VENIPUNCTURE: CPT

## 2017-06-24 PROCEDURE — 80053 COMPREHEN METABOLIC PANEL: CPT

## 2017-06-24 PROCEDURE — 80061 LIPID PANEL: CPT

## 2017-06-24 PROCEDURE — 85652 RBC SED RATE AUTOMATED: CPT

## 2017-06-24 PROCEDURE — 83036 HEMOGLOBIN GLYCOSYLATED A1C: CPT

## 2017-06-24 PROCEDURE — 85025 COMPLETE CBC W/AUTO DIFF WBC: CPT

## 2017-06-25 ENCOUNTER — GENERIC CONVERSION - ENCOUNTER (OUTPATIENT)
Dept: OTHER | Facility: OTHER | Age: 32
End: 2017-06-25

## 2017-08-12 ENCOUNTER — APPOINTMENT (OUTPATIENT)
Dept: LAB | Facility: HOSPITAL | Age: 32
End: 2017-08-12
Payer: COMMERCIAL

## 2017-08-12 ENCOUNTER — TRANSCRIBE ORDERS (OUTPATIENT)
Dept: ADMINISTRATIVE | Facility: HOSPITAL | Age: 32
End: 2017-08-12

## 2017-08-12 DIAGNOSIS — Z79.1 LONG TERM CURRENT USE OF NON-STEROIDAL ANTI-INFLAMMATORIES (NSAID): ICD-10-CM

## 2017-08-12 DIAGNOSIS — M06.09 RHEUMATOID ARTHRITIS OF MULTIPLE SITES WITHOUT RHEUMATOID FACTOR (HCC): ICD-10-CM

## 2017-08-12 LAB
ALBUMIN SERPL BCP-MCNC: 3.7 G/DL (ref 3.5–5)
ALP SERPL-CCNC: 52 U/L (ref 46–116)
ALT SERPL W P-5'-P-CCNC: 27 U/L (ref 12–78)
ANION GAP SERPL CALCULATED.3IONS-SCNC: 11 MMOL/L (ref 4–13)
AST SERPL W P-5'-P-CCNC: 14 U/L (ref 5–45)
BASOPHILS # BLD AUTO: 0.05 THOUSANDS/ΜL (ref 0–0.1)
BASOPHILS NFR BLD AUTO: 1 % (ref 0–1)
BILIRUB SERPL-MCNC: 0.5 MG/DL (ref 0.2–1)
BUN SERPL-MCNC: 13 MG/DL (ref 5–25)
CALCIUM SERPL-MCNC: 9 MG/DL (ref 8.3–10.1)
CHLORIDE SERPL-SCNC: 104 MMOL/L (ref 100–108)
CO2 SERPL-SCNC: 24 MMOL/L (ref 21–32)
CREAT SERPL-MCNC: 0.81 MG/DL (ref 0.6–1.3)
CRP SERPL QL: 5.3 MG/L
EOSINOPHIL # BLD AUTO: 0.12 THOUSAND/ΜL (ref 0–0.61)
EOSINOPHIL NFR BLD AUTO: 1 % (ref 0–6)
ERYTHROCYTE [DISTWIDTH] IN BLOOD BY AUTOMATED COUNT: 13.3 % (ref 11.6–15.1)
ERYTHROCYTE [SEDIMENTATION RATE] IN BLOOD: 10 MM/HOUR (ref 0–20)
GFR SERPL CREATININE-BSD FRML MDRD: 96 ML/MIN/1.73SQ M
GLUCOSE P FAST SERPL-MCNC: 89 MG/DL (ref 65–99)
HCT VFR BLD AUTO: 41.1 % (ref 34.8–46.1)
HGB BLD-MCNC: 14.1 G/DL (ref 11.5–15.4)
LYMPHOCYTES # BLD AUTO: 3.55 THOUSANDS/ΜL (ref 0.6–4.47)
LYMPHOCYTES NFR BLD AUTO: 36 % (ref 14–44)
MCH RBC QN AUTO: 30.4 PG (ref 26.8–34.3)
MCHC RBC AUTO-ENTMCNC: 34.3 G/DL (ref 31.4–37.4)
MCV RBC AUTO: 89 FL (ref 82–98)
MONOCYTES # BLD AUTO: 0.7 THOUSAND/ΜL (ref 0.17–1.22)
MONOCYTES NFR BLD AUTO: 7 % (ref 4–12)
NEUTROPHILS # BLD AUTO: 5.52 THOUSANDS/ΜL (ref 1.85–7.62)
NEUTS SEG NFR BLD AUTO: 55 % (ref 43–75)
PLATELET # BLD AUTO: 279 THOUSANDS/UL (ref 149–390)
PMV BLD AUTO: 10.9 FL (ref 8.9–12.7)
POTASSIUM SERPL-SCNC: 3.9 MMOL/L (ref 3.5–5.3)
PROT SERPL-MCNC: 7.7 G/DL (ref 6.4–8.2)
RBC # BLD AUTO: 4.64 MILLION/UL (ref 3.81–5.12)
SODIUM SERPL-SCNC: 139 MMOL/L (ref 136–145)
WBC # BLD AUTO: 9.94 THOUSAND/UL (ref 4.31–10.16)

## 2017-08-12 PROCEDURE — 85652 RBC SED RATE AUTOMATED: CPT

## 2017-08-12 PROCEDURE — 85025 COMPLETE CBC W/AUTO DIFF WBC: CPT

## 2017-08-12 PROCEDURE — 36415 COLL VENOUS BLD VENIPUNCTURE: CPT

## 2017-08-12 PROCEDURE — 86140 C-REACTIVE PROTEIN: CPT

## 2017-08-12 PROCEDURE — 80053 COMPREHEN METABOLIC PANEL: CPT

## 2017-08-18 ENCOUNTER — ALLSCRIPTS OFFICE VISIT (OUTPATIENT)
Dept: OTHER | Facility: OTHER | Age: 32
End: 2017-08-18

## 2017-08-18 DIAGNOSIS — Z79.899 OTHER LONG TERM (CURRENT) DRUG THERAPY: ICD-10-CM

## 2017-08-22 ENCOUNTER — ALLSCRIPTS OFFICE VISIT (OUTPATIENT)
Dept: OTHER | Facility: OTHER | Age: 32
End: 2017-08-22

## 2017-09-25 ENCOUNTER — ALLSCRIPTS OFFICE VISIT (OUTPATIENT)
Dept: FAMILY MEDICINE CLINIC | Facility: CLINIC | Age: 32
End: 2017-09-25
Payer: COMMERCIAL

## 2017-09-25 PROCEDURE — 90837 PSYTX W PT 60 MINUTES: CPT | Performed by: SOCIAL WORKER

## 2017-12-13 ENCOUNTER — ALLSCRIPTS OFFICE VISIT (OUTPATIENT)
Dept: OTHER | Facility: OTHER | Age: 32
End: 2017-12-13

## 2017-12-15 NOTE — PROGRESS NOTES
Assessment  1  Anxiety (300 00) (F41 9)   2  Depression (311) (F32 9)    Plan  Anxiety    · From  ClonazePAM 1 MG Oral Tablet TAKE 1 TABLET Bedtime To ClonazePAM1 MG Oral Tablet (KlonoPIN) Take 1/2 tablet in morning and 1 tablet in evening PRN  Increased BMI    · We recommend that you bring your body mass index down to 26 ; Status:Complete;  Done: 69JMP2910  Papanicolaou smear for cervical cancer screening    · PapSure - POC; Status:Temporary Deferral - Patient reports item recently done;   12/13/2018    Discussion/Summary  The patient was counseled regarding instructions for management,-- risk factor reductions,-- prognosis,-- patient and family education,-- risks and benefits of treatment options,-- importance of compliance with treatment  Possible side effects of new medications were reviewed with the patient/guardian today  The treatment plan was reviewed with the patient/guardian  The patient/guardian understands and agrees with the treatment plan      Chief Complaint  Jasvir Nguyen is here today for a recheck for her anxiety  This is a difficult month as she recently had 2 cats die unexpected a few weeks ago  It is also the anniversary of her  dying from brain cancer  She is in contact with her therapist and will be scheduling an appointment in the near future  Patient is here today for follow up of chronic conditions described in HPI  History of Present Illness  See chief complaint  Review of Systems   Constitutional: no fever-- and-- no chills  ENT: no earache,-- no sore throat-- and-- no nasal discharge  Cardiovascular: no chest pain  Respiratory: no shortness of breath,-- no cough-- and-- no shortness of breath during exertion  Gastrointestinal: no abdominal pain,-- no constipation-- and-- no diarrhea  Psychiatric: anxiety-- and-- depression, but-- as noted in HPI,-- not suicidal-- and-- no sleep disturbances  ROS reviewed  Active Problems  1  Anxiety (300 00) (F41 9)   2  Depression (311) (F32 9)   3  High risk medication use (V58 69) (Z79 899)   4  Hyperlipidemia (272 4) (E78 5)   5  Hypothyroidism (244 9) (E03 9)   6  MDD (major depressive disorder), recurrent episode, severe (296 33) (F33 2)   7  Migraine, unspecified, not intractable, without status migrainosus (346 90) (G43 909)   8  CLARK (nonalcoholic steatohepatitis) (571 8) (K75 81)   9  NSAID long-term use (V58 64) (Z79 1)   10  Obesity (278 00) (E66 9)   11  On etanercept therapy (V58 69) (Z79 899)   12  Pain, joint, multiple sites (719 49) (M25 50)   13  Pituitary microadenoma (227 3) (D35 2)   14  Polycystic ovarian syndrome (256 4) (E28 2)   15  Post traumatic stress disorder (PTSD) (309 81) (F43 10)   16  Rheumatoid arthritis of multiple sites with negative rheumatoid factor (714 0) (M06 09)   17  Sleep apnea (780 57) (G47 30)    Past Medical History  1  History of Abdominal pain, RLQ (right lower quadrant) (789 03) (R10 31)   2  History of Abnormal LFTs (liver function tests) (790 6) (R79 89)   3  History of Abnormal weight gain (783 1) (R63 5)   4  History of Acute knee pain (719 46) (M25 569)   5  History of Acute tonsillitis (463) (J03 90)   6  History of Adalimumab (Humira) long-term use (V58 69) (Z79 899)   7  History of Aspiration into airway, initial encounter (934 9) (T17 908A)   8  History of Cat bite (879 8,E906 3) (W55 01XA)   9  History of Chondromalacia, patella (717 7) (M22 40)   10  History of Common migraine without aura (346 10) (G43 009)   11  History of Depression with anxiety (300 4) (F41 8)   12  History of Diabetes Mellitus (250 00)   13  History of Elevated C-reactive protein (790 95) (R79 82)   14  History of Exertional dyspnea (786 09) (R06 09)   15  History of Fatigue (780 79) (R53 83)   16  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)   17  History of High protein C activity level (790 92) (R79 1)   18  History of Hip pain (719 45) (M25 559)   19   History of affective disorder (V11 1) (Z86 59)   20  History of benign neoplasm of pituitary gland (V12 29) (Z86 018)   21  History of common cold (V12 09) (Z86 19)   22  History of diabetes mellitus (V12 29) (Z86 39)   23  History of headache (V13 89) (Z87 898)   24  History of hyperprolactinemia (V12 29) (Z86 39)   25  History of polyarthritis (V13 4) (Z87 39)   26  History of rheumatoid arthritis (V13 4) (Z87 39)   27  History of sinusitis (V12 69) (Z87 09)   28  History of vitamin D deficiency (V12 1) (Z86 39)   29  History of weight loss (V49 89) (Z78 9)   30  History of Long-term use of hydroxychloroquine (V58 69) (Z79 899)   31  History of Migraine (346 90) (G43 909)   32  History of Need for influenza vaccination (V04 81) (Z23)   33  History of Numbness (782 0) (R20 0)   34  History of Pain, foot (729 5) (M79 673)   35  History of Prediabetes (790 29) (R73 09)   36  History of Primary stabbing headache (339 85) (G44 85)   37  History of Seronegative rheumatoid arthritis (714 0) (M06 00)   38  History of Sprain of ankle, left (845 00) (S93 402A)   39  History of Tendonitis (726 90) (M77 9)   40  History of Tension type headache (339 10) (G44 209)   41  History of Tingling in extremities (782 0) (R20 2)   42  History of TMJ Appearance   43  History of Trochanteric bursitis of right hip (726 5) (M70 61)   44  History of Vaginal candidiasis (112 1) (B37 3)   45  History of Viral URI with cough (465 9) (J06 9,B97 89)   46  History of Wellness examination (V70 0) (Z00 00)    The active problems and past medical history were reviewed and updated today  Surgical History  1  History of Dental Surgery   2  History of Gastrectomy Sleeve   3  History of Nasal Septal Deviation Repair   4  History of Oral Surgery Tooth Extraction   5  History of Tonsillectomy    The surgical history was reviewed and updated today  Family History  Mother    1  Family history of Denial Of Any Significant Medical History   2   Family history of arthritis (V17 7) (Z82 61)   3  Family history of psoriasis (V19 4) (Z84 0)  Father    4  Family history of Denial Of Any Significant Medical History  Paternal Grandmother    5  Family history of Diabetes  Maternal Grandfather    6  Family history of Black lung disease  Paternal Grandfather    9  Family history of Cancer  Family History    8  Family history of CAD (coronary artery disease)   9  Family history of Colon cancer   10  Denied: Family history of Crohn's disease   6  Family history of diabetes mellitus (V18 0) (Z83 3)   12  Denied: Family history of rheumatoid arthritis   13  Denied: Family history of systemic lupus erythematosus   14  Denied: Family history of ulcerative colitis    The family history was reviewed and updated today  Social History   · Caffeine use (V49 89) (F15 90)   · Currently sexually active   · Full-time employment   · High school or GED   · Never a smoker   · No drug use   · Occasional alcohol use   · Patient has living will (V49 89) (Z78 9)   ·   The social history was reviewed and updated today  The social history was reviewed and is unchanged  Current Meds   1  ClonazePAM 1 MG Oral Tablet; TAKE 1 TABLET Bedtime; Last Rx:02Nov2017 Ordered   2  Enbrel SureClick 50 MG/ML Subcutaneous Solution Auto-injector; Inject 50 mg under the skin once weekly; Therapy: 48Wms4526 to (Christiano Dong)  Requested for: 04Zmw3934; Last Rx:60Ymp2835 Ordered   3  QUEtiapine Fumarate 50 MG Oral Tablet; TAKE 1 TABLET AT W/ Supper; Therapy: 73EBM0571 to (Evaluate:13Jpk5206)  Requested for: 92Vxb2122; Last Rx:08Pza5210 Ordered   4  Rosuvastatin Calcium 10 MG Oral Tablet; TAKE 1 TABLET DAILY; Therapy: 34GJS2300 to (Evaluate:12Inp2041)  Requested for: 26RYS0595; Last QP:43GRP9959 Ordered   5  SUMAtriptan Succinate 6 MG/0 5ML Subcutaneous Solution Auto-injector; inject 0 5ml at onset of headache and may repeat in 2 hours if needed, max 2inj/day max 3days/week;  Therapy: 78BVP1492 to (Evaluate:12Mar2017) Requested for: 30XHT3006; Last Rx:11Jan2017 Ordered   6  SUMAtriptan Succinate 6 MG/0 5ML Subcutaneous Solution; inject 0 5ml at onset of headache and may repeat in 2 hours if needed, max 2inj/day max 3days/week; Therapy: 82Qsy4971 to (Last Rx:47Pkk2961)  Requested for: 08Ayl5135 Ordered   7  TiZANidine HCl - 4 MG Oral Tablet; TAKE 1/2 TO 1 TABLET 3 TIMES DAILY AS NEEDED; Therapy: 96BZC4904 to (Evaluate:13Mar2018)  Requested for: 64Weh2721; Last Rx:88Pfu4622 Ordered   8  Verapamil HCl - 40 MG Oral Tablet; TAKE 1/2 TABLET BY MOUTH DAILY; Therapy: 52Cpb0545 to (Evaluate:79Nut9931)  Requested for: 16Qkr5011; Last Rx:74Ywo6255 Ordered    The medication list was reviewed and updated today  Allergies  1  Augmentin TABS   2  NSAIDs    Vitals  Vital Signs    Recorded: 72UWW4043 81:55XI   Systolic 694, LUE, Sitting   Diastolic 70, LUE, Sitting   Height 5 ft 6 in   Weight 175 lb 8 0 oz   BMI Calculated 28 33   BSA Calculated 1 89     Physical Exam   Constitutional  General appearance: No acute distress, well appearing and well nourished  Ears, Nose, Mouth, and Throat  External inspection of ears and nose: Normal    Otoscopic examination: Tympanic membranes translucent with normal light reflex  Canals patent without erythema  Oropharynx: Normal with no erythema, edema, exudate or lesions  Pulmonary  Respiratory effort: No increased work of breathing or signs of respiratory distress  Auscultation of lungs: Clear to auscultation  Cardiovascular  Auscultation of heart: Normal rate and rhythm, normal S1 and S2, without murmurs  Lymphatic  Palpation of lymph nodes in neck: No lymphadenopathy  Skin  Skin and subcutaneous tissue: Normal without rashes or lesions     Psychiatric  Orientation to person, place, and time: Normal    Mood and affect: Normal          Signatures   Electronically signed by : Francie Gosselin, [address]; Dec 13 2017  3:17PM EST                       (Author)    Electronically signed by : Sushil Rodríguez, DO; Dec 13 2017  3:56PM EST                       (Author)

## 2018-01-10 NOTE — PSYCH
Progress Note  Individual Psychotherapy 60 min minutes provided today  Goals addressed in session:   GOALS- Maintaining self care   Corinnas affect and mood is upbeat and euthymic  Julisa Nino shares that she is all registered for school and she is very excited  Julisa Nino shares that she and Josephine Sharma went on vacation together just as friends, but she is a little confused abut status  Discussed the need for Julisa Nino to remain focused on herself and not be problem solve for him and to be careful not to allow stress w Josephine Sharma to distract her  Therapist pointed out that she was relaying frustration about Josephine Sharma again, and this may be a cue that she s trying to problem solve for him and take on his responsibilities again- Julisa Nino recognized this as well and was had already taken steps for more space again  On a scale of 0 to 10, the patient rates current pain at 4   Assessment  Mood is stable and euthymic, Julisa Nino has maintained progress over the last month  Plan  See in 1 month for maintenance- agreed that this is sufficient as Maraapril Nino has improved  Signatures   Electronically signed by :  Liliana Xiao; Aug 13 1327  4:56PM EST                       (Author)

## 2018-01-10 NOTE — RESULT NOTES
Verified Results  (1) CBC/PLT/DIFF 50GGR4082 10:15AM [facility] Order Number: [id number]     Test Name Result Flag Reference   WBC COUNT 8 63 Thousand/uL  4 31-10 16   RBC COUNT 4 70 Million/uL  3 81-5 12   HEMOGLOBIN 14 1 g/dL  11 5-15 4   HEMATOCRIT 42 6 %  34 8-46  1   MCV 91 fL  82-98   MCH 30 0 pg  26 8-34 3   MCHC 33 1 g/dL  31 4-37 4   RDW 13 7 %  11 6-15 1   MPV 12 2 fL  8 9-12 7   PLATELET COUNT 502 Thousands/uL  149-390   NEUTROPHILS RELATIVE PERCENT 49 %  43-75   LYMPHOCYTES RELATIVE PERCENT 42 %  14-44   MONOCYTES RELATIVE PERCENT 7 %  4-12   EOSINOPHILS RELATIVE PERCENT 1 %  0-6   BASOPHILS RELATIVE PERCENT 1 %  0-1   NEUTROPHILS ABSOLUTE COUNT 4 17 Thousands/? ??L  1 85-7 62   LYMPHOCYTES ABSOLUTE COUNT 3 66 Thousands/? ??L  0 60-4 47   MONOCYTES ABSOLUTE COUNT 0 64 Thousand/? ??L  0 17-1 22   EOSINOPHILS ABSOLUTE COUNT 0 12 Thousand/? ??L  0 00-0 61   BASOPHILS ABSOLUTE COUNT 0 04 Thousands/? ??L  0 00-0 10     (1) COMPREHENSIVE METABOLIC PANEL 06AAK9469 68:00[facility]   [facility] Order Number: [id number]     Test Name Result Flag Reference   SODIUM 140 mmol/L  136-145   POTASSIUM 4 1 mmol/L  3 5-5 3   CHLORIDE 104 mmol/L  100-108   CARBON DIOXIDE 24 mmol/L  21-32   ANION GAP (CALC) 12 mmol/L  4-13   BLOOD UREA NITROGEN 7 mg/dL  5-25   CREATININE 0 85 mg/dL  0 60-1 30   Standardized to IDMS reference method   CALCIUM 9 3 mg/dL  8 3-10 1   BILI, TOTAL 0 60 mg/dL  0 20-1 00   ALK PHOSPHATAS 61 U/L     ALT (SGPT) 31 U/L  12-78   AST(SGOT) 19 U/L  5-45   ALBUMIN 4 0 g/dL  3 5-5 0   TOTAL PROTEIN 7 9 g/dL  6 4-8 2   eGFR Non-African American      >60 0 ml/min/1 73sq m   [facility] Disease Education Program recommendations are as follows:  GFR calculation is accurate only with a steady state creatinine  Chronic Kidney disease less than 60 ml/min/1 73 sq  meters  Kidney failure less than 15 ml/min/1 73 sq  meters     GLUCOSE FASTING 82 mg/dL  65-99     (1) LIPID PANEL, FASTING 46EPF7903 10:15AM [facility]te[facility]    Order Number: [id number]     Test Name Result Flag Reference   CHOLESTEROL 222 mg/dL H    HDL,DIRECT 31 mg/dL L 40-60   Specimen collection should occur prior to Metamizole administration due to the potential for falsely depressed results  LDL CHOLESTEROL CALCULATED 154 mg/dL H 0-100   Triglyceride:         Normal              <150 mg/dl       Borderline High    150-199 mg/dl       High               200-499 mg/dl       Very High          >499 mg/dl  Cholesterol:         Desirable        <200 mg/dl      Borderline High  200-239 mg/dl      High             >239 mg/dl  HDL Cholesterol:        High    >59 mg/dL      Low     <41 mg/dL  LDL CALCULATED:    This screening LDL is a calculated result  It does not have the accuracy of the Direct Measured LDL in the monitoring of patients with hyperlipidemia and/or statin therapy  Direct Measure LDL (OTJ541) must be ordered separately in these patients  TRIGLYCERIDES 183 mg/dL H <=150   Specimen collection should occur prior to N-Acetylcysteine or Metamizole administration due to the potential for falsely depressed results  (1) HEMOGLOBIN A1C 05CWC3753 10:15AM [facility]te[facility]    Order Number: [id number]     Test Name Result Flag Reference   HEMOGLOBIN A1C 4 7 %  4 2-6 3   EST  AVG   GLUCOSE 88 mg/dl       (1) SED RATE 60GVN3184 10:15AM Paul Ortega    Order Number: [id number]     Test Name Result Flag Reference   SED RATE 23 mm/hour H 0-20     (1) C-REACTIVE PROTEIN 23JUH7461 10:15AM [facility] Order Number: [id number]     Test Name Result Flag Reference   C-REACT PROTEIN 7 3 mg/L H <3 0

## 2018-01-10 NOTE — PROGRESS NOTES
Assessment    1  Never a smoker   2  Controlled type 2 diabetes mellitus with diabetic nephropathy, without long-term current   use of insulin (250 40,583 81) (E11 21)    Plan  Anxiety    · ClonazePAM 1 MG Oral Tablet (KlonoPIN); TAKE 1 TABLET Bedtime    Discussion/Summary  health maintenance visit the risks and benefits of cervical cancer screening were discussed cervical cancer screening is current Breast cancer screening: the risks and benefits of breast cancer screening were discussed and monthly self breast exam was advised  Colorectal cancer screening: the risks and benefits of colorectal cancer screening were discussed and colorectal cancer screening is not indicated  History of Present Illness  HM, Adult Female: The patient is being seen for a health maintenance evaluation  The last health maintenance visit was 2 year(s) ago  Social History: She is   Work status: working full time and occupation: nurse navigator  The patient has never smoked cigarettes  She reports never drinking alcohol  She has never used illicit drugs  General Health: She has regular dental visits  She complains of vision problems  Vision care includes wearing glasses and an eye examination within the last year  She denies hearing loss  Lifestyle:  She consumes a diverse and healthy diet  She has weight concerns  She exercises regularly  She does not use tobacco  She consumes alcohol  She denies drug use  Reproductive health: the patient is premenopausal   she reports normal menses  she uses no contraception  she is not sexually active  she denies prior pregnancies  Screening: cancer screening reviewed and current  Cervical cancer screening includes a pap smear performed last year  Breast cancer screening includes no previous mammogram  She hasn't been previously screened for colorectal cancer  metabolic screening reviewed and current   Metabolic screening includes lipid profile performed within the past five years, glucose screening performed last year and thyroid function test performed last year  risk screening reviewed and current  Cardiovascular risk factors: hypertension, but no diabetes, no high LDL cholesterol, no low HDL cholesterol, no stress, no obesity, no tobacco use, no illicit drug use, no sedentary lifestyle and no family history of cardiovascular disease  General health risks: no previous breast cancer, no abnormal cervical cytology, no positive screening for human papilloma virus, no hormone therapy, no diethylstilbestrol (GRANT) exposure in utero, no previous colon polyps, no inflammatory bowel disease, no osteoporosis risk factors, no asbestos exposure, no radiation exposure and no occupational exposure to  Safety elements used: seat belt, bicycle helmet, safe driving habits, sunscreen, smoke detector, carbon monoxide detector, hot water temperature less than 120 degrees F, bathroom grab bars, fall prevention measures, CPR training for the patient and CPR training for household members, but no motorcycle helmet, no gun trigger locks and no gun safe  HPI: health maintanance visit      Review of Systems    Constitutional: No fever, no chills, feels well, no tiredness, no recent weight gain or weight loss  Eyes: No complaints of eye pain, no red eyes, no eyesight problems, no discharge, no dry eyes, no itching of eyes  ENT: no complaints of earache, no loss of hearing, no nose bleeds, no nasal discharge, no sore throat, no hoarseness  Cardiovascular: No complaints of slow heart rate, no fast heart rate, no chest pain, no palpitations, no leg claudication, no lower extremity edema  Respiratory: No complaints of shortness of breath, no wheezing, no cough, no SOB on exertion, no orthopnea, no PND  Gastrointestinal: s/p gastric bypass  Genitourinary: No complaints of dysuria, no incontinence, no pelvic pain, no dysmenorrhea, no vaginal discharge or bleeding     Musculoskeletal: No complaints of arthralgias, no myalgias, no joint swelling or stiffness, no limb pain or swelling  Integumentary: No complaints of skin rash or lesions, no itching, no skin wounds, no breast pain or lump  Neurological: No complaints of headache, no confusion, no convulsions, no numbness, no dizziness or fainting, no tingling, no limb weakness, no difficulty walking  ROS reviewed  Active Problems    1  Anxiety (300 00) (F41 9)   2  Aspiration into airway, initial encounter (934 9) (T17 908A)   3  Benign neoplasm of pituitary gland (227 3) (D35 2)   4  Chondromalacia, patella (717 7) (M22 40)   5  Controlled type 2 diabetes mellitus with diabetic nephropathy, without long-term current   use of insulin (250 40,583 81) (E11 21)   6  Depression (311) (F32 9)   7  Exertional dyspnea (786 09) (R06 09)   8  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)   9  High protein C activity level (790 92) (R79 1)   10  High risk medication use (V58 69) (Z79 899)   11  Hyperlipidemia (272 4) (E78 5)   12  Hyperprolactinemia (253 1) (E22 1)   13  Hypothyroidism (244 9) (E03 9)   14  Left foot pain (729 5) (M79 672)   15  Long-term use of hydroxychloroquine (V58 69) (Z79 899)   16  Loss of weight (783 21) (R63 4)   17  MDD (major depressive disorder), recurrent episode, severe (296 33) (F33 2)   18  Migraine (346 90) (G43 909)   19  Migraine, unspecified, not intractable, without status migrainosus (346 90) (G43 909)   20  Mood disorder (296 90) (F39)   21  CLARK (nonalcoholic steatohepatitis) (571 8) (K75 81)   22  Need for influenza vaccination (V04 81) (Z23)   23  NSAID long-term use (V58 64) (Z79 1)   24  Obesity (278 00) (E66 9)   25  On etanercept therapy (V58 69) (Z79 899)   26  Pain, foot (729 5) (M79 673)   27  Pain, joint, multiple sites (719 49) (M25 50)   28  Pap smear for cervical cancer screening (V76 2) (Z12 4)   29  Pituitary microadenoma (227 3) (D35 2)   30  Polycystic ovarian syndrome (256 4) (E28 2)   31   Post traumatic stress disorder (PTSD) (309 81) (F43 10)   32  Prediabetes (790 29) (R73 09)   33  Primary stabbing headache (339 85) (G44 85)   34  Rheumatoid arthritis of multiple sites with negative rheumatoid factor (714 0) (M06 09)   35  Screening for depression (V79 0) (Z13 89)   36  Screening for diabetic retinopathy (V80 2) (Z13 5)   37  Sleep apnea (780 57) (G47 30)   38  Vitamin D deficiency (268 9) (E55 9)   39   Wellness examination (V70 0) (Z00 00)    Past Medical History    · History of Abdominal pain, RLQ (right lower quadrant) (789 03) (R10 31)   · History of Abnormal LFTs (liver function tests) (790 6) (R79 89)   · History of Abnormal weight gain (783 1) (R63 5)   · History of Acute knee pain (719 46) (M25 569)   · History of Acute tonsillitis (463) (J03 90)   · History of Adalimumab (Humira) long-term use (V58 69) (Z79 899)   · History of Cat bite (879 8,E906 3) (W55 01XA)   · History of Common migraine without aura (346 10) (G43 009)   · History of Depression with anxiety (300 4) (F41 8)   · History of Diabetes Mellitus (250 00)   · History of Elevated C-reactive protein (790 95) (R79 82)   · History of Fatigue (780 79) (R53 83)   · History of Hip pain (719 45) (M25 559)   · History of common cold (V12 09) (Z86 19)   · History of diabetes mellitus (V12 29) (Z86 39)   · History of headache (V13 89) (D88 929)   · History of polyarthritis (V13 4) (Z87 39)   · History of rheumatoid arthritis (V13 4) (Z87 39)   · History of sinusitis (V12 69) (Z87 09)   · History of Need for influenza vaccination (V04 81) (Z23)   · History of Numbness (782 0) (R20 0)   · History of Seronegative rheumatoid arthritis (714 0) (M06 00)   · History of Sprain of ankle, left (845 00) (S93 402A)   · History of Tendonitis (726 90) (M77 9)   · History of Tension type headache (339 10) (G44 209)   · History of Tingling in extremities (782 0) (R20 2)   · History of TMJ Appearance   · History of Trochanteric bursitis of right hip (726 5) (M70 61)   · History of Vaginal candidiasis (112 1) (B37 3)   · History of Viral URI with cough (465 9) (J06 9,B97 89)    Surgical History    · History of Dental Surgery   · History of Gastrectomy Sleeve   · History of Nasal Septal Deviation Repair   · History of Oral Surgery Tooth Extraction   · History of Tonsillectomy    Family History  Mother    · Family history of Denial Of Any Significant Medical History   · Family history of arthritis (V17 7) (Z82 61)   · Family history of psoriasis (V19 4) (Z84 0)  Father    · Family history of Denial Of Any Significant Medical History  Paternal Grandmother    · Family history of Diabetes  Maternal Grandfather    · Family history of Black lung disease  Paternal Grandfather    · Family history of Cancer  Family History    · Family history of CAD (coronary artery disease)   · Family history of Colon cancer   · Denied: Family history of Crohn's disease   · Family history of diabetes mellitus (V18 0) (Z83 3)   · Denied: Family history of rheumatoid arthritis   · Denied: Family history of systemic lupus erythematosus   · Denied: Family history of ulcerative colitis    Social History    · Caffeine use (V49 89) (F15 90)   · Currently sexually active   · Full-time employment   · customer service   · High school or GED   · Never a smoker   · No drug use   · Occasional alcohol use   · 1 drink/month   · Patient has living will (V49 89) (Z78 9)   ·     Current Meds   1  ClonazePAM 1 MG Oral Tablet (KlonoPIN); TAKE 1 TABLET Bedtime; Last Rx:23Gvd3153   Ordered   2  Enbrel SureClick 50 MG/ML Subcutaneous Solution Auto-injector; Inject 50 mg under the   skin once weekly; Therapy: 94Zlr4936 to (Evaluate:84Ljl7631)  Requested for: 17UXV8224; Last   Rx:69Usu7117 Ordered   3  QUEtiapine Fumarate 50 MG Oral Tablet; TAKE 1 TABLET AT W/ Supper; Therapy: 28ALS7174 to (Evaluate:02Dkq2511)  Requested for: 67Yjl2971; Last   Rx:96Awz8112 Ordered   4   Rosuvastatin Calcium 10 MG Oral Tablet; TAKE 1 TABLET DAILY; Therapy: 88YQT4740 to (Evaluate:26Oio5074)  Requested for: 59MLP0592; Last   NN:57GKF0322 Ordered   5  SUMAtriptan Succinate 6 MG/0 5ML Subcutaneous Solution Auto-injector; inject 0 5ml at   onset of headache and may repeat in 2 hours if needed,   max 2inj/day max 3days/week; Therapy: 80HOM8893 to (Evaluate:12Mar2017)  Requested for: 85BFH6645; Last   Rx:11Jan2017 Ordered   6  SUMAtriptan Succinate 6 MG/0 5ML Subcutaneous Solution; inject 0 5ml at onset of   headache and may repeat in 2 hours if needed, max 2inj/day max 3days/week; Therapy: 28Kut4954 to (Last Rx:52Chz0663)  Requested for: 37Jsn5661 Ordered   7  TiZANidine HCl - 4 MG Oral Tablet; TAKE 1/2 TO 1 TABLET 3 TIMES DAILY AS NEEDED; Therapy: 28QHZ4587 to (Evaluate:10Aug2017)  Requested for: 06XFO9534; Last   Rx:35Ytu5284 Ordered   8  Verapamil HCl - 40 MG Oral Tablet; TAKE 1/2 TABLET BY MOUTH DAILY; Therapy: 46Tqq1177 to (Evaluate:18Feb2018)  Requested for: 27Cub7218 Recorded    Allergies    1  Augmentin TABS   2  NSAIDs    Vitals   Recorded: 63MIL2373 99:50KO   Systolic 634   Diastolic 72   Height 5 ft 6 in   Weight 194 lb 8 oz   BMI Calculated 31 39   BSA Calculated 1 98     Physical Exam    Constitutional   General appearance: No acute distress, well appearing and well nourished  Eyes   Conjunctiva and lids: No swelling, erythema or discharge  Pupils and irises: Equal, round and reactive to light  Ears, Nose, Mouth, and Throat   External inspection of ears and nose: Normal     Otoscopic examination: Tympanic membranes translucent with normal light reflex  Canals patent without erythema  Oropharynx: Normal with no erythema, edema, exudate or lesions  Pulmonary   Respiratory effort: No increased work of breathing or signs of respiratory distress  Auscultation of lungs: Clear to auscultation  Cardiovascular   Palpation of heart: Normal PMI, no thrills      Auscultation of heart: Normal rate and rhythm, normal S1 and S2, without murmurs  Examination of extremities for edema and/or varicosities: Normal     Abdomen   Abdomen: Non-tender, no masses  Liver and spleen: No hepatomegaly or splenomegaly  Lymphatic   Palpation of lymph nodes in neck: No lymphadenopathy  Musculoskeletal   Gait and station: Normal     Digits and nails: Normal without clubbing or cyanosis  Inspection/palpation of joints, bones, and muscles: Normal     Skin   Skin and subcutaneous tissue: Normal without rashes or lesions  Neurologic   Cranial nerves: Cranial nerves 2-12 intact  Reflexes: 2+ and symmetric  Sensation: No sensory loss      Psychiatric   Orientation to person, place, and time: Normal     Mood and affect: Normal        Future Appointments    Date/Time Provider Specialty Site   02/23/2018 09:20 AM Deepa Gentile [facility] Rheumatology [address] [facility]     Signatures   Electronically signed by : Jose Alberto Rivers DO; Aug 22 2017  5:04PM EST                       (Author)

## 2018-01-10 NOTE — RESULT NOTES
Verified Results  (1) HEMOGLOBIN A1C 20Oct2016 08:25AM [facility]     Test Name Result Flag Reference   HEMOGLOBIN A1C 5 7 %  4 2-6 3   EST  AVG  GLUCOSE 117 mg/dl       (1) LIPID PANEL, FASTING 20Oct2016 08:25AM [facility]     Test Name Result Flag Reference   CHOLESTEROL 211 mg/dL H    HDL,DIRECT 27 mg/dL L 40-60   Specimen collection should occur prior to Metamizole administration due to the potential for falsely depressed results  LDL CHOLESTEROL CALCULATED 127 mg/dL H 0-100   - Patient Instructions: This is a fasting blood test  Water,black tea or black  coffee only after 9:00pm the night before test   Drink 2 glasses of water the morning of test     - Patient Instructions: This is a fasting blood test  Water,black tea or black  coffee only after 9:00pm the night before test Drink 2 glasses of water the morning of test   Triglyceride:         Normal              <150 mg/dl       Borderline High    150-199 mg/dl       High               200-499 mg/dl       Very High          >499 mg/dl  Cholesterol:         Desirable        <200 mg/dl      Borderline High  200-239 mg/dl      High             >239 mg/dl  HDL Cholesterol:        High    >59 mg/dL      Low     <41 mg/dL  LDL CALCULATED:    This screening LDL is a calculated result  It does not have the accuracy of the Direct Measured LDL in the monitoring of patients with hyperlipidemia and/or statin therapy  Direct Measure LDL (JUF253) must be ordered separately in these patients  TRIGLYCERIDES 284 mg/dL H <=150   Specimen collection should occur prior to N-Acetylcysteine or Metamizole administration due to the potential for falsely depressed results       (1) MICROALBUMIN CREATININE RATIO, RANDOM URINE 20Oct2016 08:25[facility]     Test Name Result Flag Reference   MICROALBUMIN/ CREAT R 7 mg/g creatinine  0-30   MICROALBUMIN,URINE 17 9 mg/L  0 0-20 0   CREATININE URINE 272 0 mg/dL       (1) PROLACTIN 20Oct2016 08:25[facility] Test Name Result Flag Reference   PROLACTIN 23 8 ng/mL     PROLACTIN:     Females:   ? Non-pregnant ? ?2 2-30 3 ?ng/mL   ? Pregnant ? ? ? ?8 1-347 6 ng/mL   ? Post-menopausal 0 7-31 5 ?ng/mL     - Patient Instructions:  This is a fasting blood test  Water,black tea or black  coffee only after 9:00pm the night before test Drink 2 glasses of water the morning of test

## 2018-01-11 NOTE — PSYCH
Progress Note  Individual Psychotherapy 60 min minutes provided today  Goals addressed in session:   [address] [facility] after is bright and her mood is excited  SHe is all set up for her bariatric surgery next week  Nallely Hooker has been on the liquid diet for over a week now, and while it is difficult, she is determined  Nallely Hooker shares that she "loves" her new job  Frannie's attitude is much more positive and she reports that her mood has mostly been stable and good  Nallely Hooker shares that she did not realize just how stressful her job was until she left and has one in which she really feels appreciated  Nallely Hooker has also been communicating with a man and has gone on a few dates, she is not rushing into anything, but she is really enjoying his company  She has had to go off her RA meds, and is having more pain, but she is handling it  On a scale of 0 to 10, the patient rates current pain at 4   Current suicide risk is low   Assessment  Mood is positive and euthymic- Nallely Hooker is feeling empowered and taking steps to actively improve her life  Plan  Agreed next session will be in 6 weeks or so- for maintenance and check in  Signatures   Electronically signed by :  Liliana Viramontes; Mar 18 1239 11:30PM EST                       (Author)

## 2018-01-11 NOTE — PSYCH
Progress Note  Individual Psychotherapy 60 min minutes provided today  Goals addressed in session:   GOALS- Relaxation exercises/ boundary setting  Lena Godwin is eager to shares that she found an effective relaxation exercise as an regina on her phone  Lena Oharas shares that she has been using it daily and that it has really helped her deescalate when becoming angry or anxious  Lena Godwin shares that she has an interview w a new job- expresses her frustrations w her current job  Lena Godwin vents about a number of frustrations w Ameena Polanco and she is questioning if she wants to remain in the relationship  Therapist suggested that she try to communicate directly w Ameena Ploanco about what she is unhappy about before ending the relationship- she agreed she would like to try this first    Current Pain Assessment: moderate to severe   On a scale of 0 to 10, the patient rates current pain at 5   Current suicide risk is low   Diagnosis and Treatment Plan explained to patient, patient relates understanding diagnosis and is agreeable to Treatment Plan  Assessment  Mood is less anxious/depressed, and Lena Godwin in actively  working on managing her stress more effectively     Plan  See in 2 weeks- Lena Oharas will consider asking to join Sy's therapy session  Signatures   Electronically signed by : Liliana Gonzalez; Mar 18 [id number]  8:54PM EST                       (Author)    Electronically signed by :  Liliana Gonzalez; Mar 18 9176 10:09PM EST                       (Author)

## 2018-01-11 NOTE — PSYCH
Psych Med Mgmt    Appearance: was calm and cooperative  Observed mood: mood appropriate  Observed mood: affect appropriate  Speech: a normal rate  Thought processes: coherent/organized  Hallucinations: no hallucinations present  Treatment Recommendations: Effexor  mg po qam  Wellbutrin  mg po qam- unable to get brand and is appealing- she does not sleep as well with generic  Klonopin 1 mg 1/2 - 1 po bid prn  Use Seroquel 50 mg po qhs prn  Risks, Benefits And Possible Side Effects Of Medications: Risks, benefits, and possible side effects of medications explained to patient and patient verbalizes understanding  She reports normal appetite, normal energy level, recent lbs weight loss and decrease in number of sleep hours   Pt states she is feeling a little better- she states she has been having difficulty sleeping  She states she has been having difficulty falling asleep, not feeling tired until late 1 am- 2 am       Vitals  Signs [Data Includes: Current Encounter]   Recorded: 25Mar2016 09:09AM   Heart Rate: 103  Respiration: 16  Systolic: 190  Diastolic: 83  Recorded: 17HQU1539 08:56AM   Height: 5 ft 6 in  Weight: 253 lb   BMI Calculated: 40 84  BSA Calculated: 2 21    DSM    Provisional Diagnosis: Major Depression Recurrent   Anxiety D/O  Assessment    1  MDD (major depressive disorder), recurrent episode, severe (296 33) (F33 2)   2  Anxiety (300 00) (F41 9)    Plan    1  ClonazePAM 1 MG Oral Tablet (KlonoPIN); TAKE 1/2 -1 TABLET EVERY 12   HOURS AS NEEDED   2  Venlafaxine HCl  MG Oral Capsule Extended Release 24 Hour (Effexor   XR); TAKE 1 CAPSULE DAILY    3  QUEtiapine Fumarate 50 MG Oral Tablet; 1 po qhs prn   4  BuPROPion HCl ER (XL) 150 MG Oral Tablet Extended Release 24 Hour   (Wellbutrin XL); 1 tab po Qam    Review of Systems    Constitutional: No fever, no chills, feels well, no tiredness, no recent weight gain or loss     Cardiovascular: no complaints of slow or fast heart rate, no chest pain, no palpitations  Respiratory: no complaints of shortness of breath, no wheezing, no dyspnea on exertion  Gastrointestinal: no complaints of abdominal pain, no constipation, no nausea, no diarrhea, no vomiting  Genitourinary: no complaints of dysuria, no incontinence, no pelvic pain, no urinary frequency  Musculoskeletal: knee pain  Integumentary: no complaints of skin rash, no itching, no dry skin  Active Problems    1  Anxiety (300 00) (F41 9)   2  Benign neoplasm of pituitary gland (227 3) (D35 2)   3  Chondromalacia, patella (717 7) (M22 40)   4  Depression (311) (F32 9)   5  Exertional dyspnea (786 09) (R06 09)   6  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)   7  High protein C activity level (790 92) (R79 1)   8  Hyperlipidemia (272 4) (E78 5)   9  Hyperprolactinemia (253 1) (E22 1)   10  Hypothyroidism (244 9) (E03 9)   11  Left foot pain (729 5) (M79 672)   12  Long-term use of hydroxychloroquine (V58 69) (Z79 899)   13  MDD (major depressive disorder), recurrent episode, severe (296 33) (F33 2)   14  Migraine (346 90) (G43 909)   15  Migraine headache (346 90) (G43 909)   16  Mood disorder (296 90) (F39)   17  CLARK (nonalcoholic steatohepatitis) (571 8) (K75 81)   18  NSAID long-term use (V58 64) (Z79 1)   19  Obesity (278 00) (E66 9)   20  On etanercept therapy (V58 69) (Z79 899)   21  Pain, foot (729 5) (M79 673)   22  Pain, joint, multiple sites (719 49) (M25 50)   23  Pituitary microadenoma (227 3) (D35 2)   24  Polycystic ovarian syndrome (256 4) (E28 2)   25  Post traumatic stress disorder (PTSD) (309 81) (F43 10)   26  Prediabetes (790 29) (R73 09)   27  Rheumatoid arthritis of multiple sites with negative rheumatoid factor (714 0) (M06 09)   28  Sprain of ankle, left (845 00) (S93 402A)   29  TMJ Appearance   30  Trochanteric bursitis of right hip (726 5) (M70 61)   31  Vitamin D deficiency (268 9) (E55 9)    Past Medical History    1   History of Abdominal pain, RLQ (right lower quadrant) (789 03) (R10 31)   2  History of Abnormal LFTs (liver function tests) (790 6) (R94 5)   3  History of Abnormal weight gain (783 1) (R63 5)   4  History of Acute knee pain (719 46) (M25 569)   5  History of Acute tonsillitis (463) (J03 90)   6  History of Cat bite (879 8,E906 3) (W55 01XA)   7  History of Common migraine without aura (346 10) (G43 009)   8  History of Depression with anxiety (300 4) (F41 8)   9  History of Diabetes Mellitus (250 00)   10  History of Elevated C-reactive protein (790 95) (R79 82)   11  History of Fatigue (780 79) (R53 83)   12  History of Hip pain (719 45) (M25 559)   13  History of common cold (V12 09) (Z86 19)   14  History of diabetes mellitus (V12 29) (Z86 39)   15  History of headache (V13 89) (Z87 898)   16  History of polyarthritis (V13 4) (Z87 39)   17  History of rheumatoid arthritis (V13 4) (Z87 39)   18  History of sinusitis (V12 69) (Z87 09)   19  History of Need for influenza vaccination (V04 81) (Z23)   20  History of Numbness (782 0) (R20 0)   21  History of Primary stabbing headache (339 85) (G44 85)   22  History of Seronegative rheumatoid arthritis (714 0) (M06 00)   23  History of Tendonitis (726 90) (M77 9)   24  History of Tension type headache (339 10) (G44 209)   25  History of Tingling in extremities (782 0) (R20 2)   26  History of Vaginal candidiasis (112 1) (B37 3)    Allergies    1  Augmentin TABS    Current Meds   1  BuPROPion HCl ER (XL) 150 MG Oral Tablet Extended Release 24 Hour; 1 tab po Qam;   Therapy: 50HQE0484 to (Last Rx:22Jan2016)  Requested for: 16WVY1462 Ordered   2  ClonazePAM 1 MG Oral Tablet; TAKE 1/2 -1 TABLET EVERY 12 HOURS AS NEEDED; Last   Rx:22Jan2016 Ordered   3  Dymista 137-50 MCG/ACT Nasal Suspension; INSTILL 2 SQUIRT Daily in each nostril; Therapy: 26AGC4929 to (Angelica Sherwood)  Requested for: 14ZEL5414; Last   Rx:24Sep2015 Ordered   4   Enbrel SureClick 50 MG/ML Subcutaneous Solution Auto-injector; INJECT 50 MG UNDER   THE SKIN ONCE WEEKLY AS DIRECTED; Last Rx:63Xvc0618 Ordered   5  Glucophage  MG Oral Tablet Extended Release 24 Hour; 2,0mg total daily in AM   (4 tabs) Recorded   6  Hydroxychloroquine Sulfate 200 MG Oral Tablet; take 1 tablet twice daily after meals; Therapy: 36NOJ0532 to (Evaluate:05Jan2016)  Requested for: 22ITF5409; Last   Rx:32Xcu6823 Ordered   7  Nabumetone 750 MG Oral Tablet; TAKE 1 TABLET TWICE DAILY AFTER MEALS; Therapy: 21HGL4315 to (Shwetha Jang)  Requested for: 87WMM2365; Last   Rx:17Vpq2688 Ordered   8  TraMADol HCl - 50 MG Oral Tablet; TAKE 1 TABLET 3 TIMES DAILY AS NEEDED; Therapy: 95LXP7527 to (Last Rx:24Mar2016) Ordered   9  Venlafaxine HCl  MG Oral Capsule Extended Release 24 Hour; TAKE 1 CAPSULE   DAILY  Requested for: 45NXE3901; Last Rx:46Wvv2454 Ordered   10  Verapamil HCl - 40 MG Oral Tablet; Take 1 tablet po BID; Therapy: 55TTN5755 to (Evaluate:17Jan2016)  Requested for: 40NLO4191; Last    Rx:55Xal3797 Ordered   11  Wellbutrin  MG Oral Tablet Extended Release 24 Hour; 1 po qam;    Therapy: 68LIK6307 to (Last Rx:29Wnw2647)  Requested for: 41Plw9884 Ordered    The medication list was reviewed and updated today  Family Psych History    1  Family history of Denial Of Any Significant Medical History   2  Family history of arthritis (V17 7) (Z82 61)   3  Family history of psoriasis (V19 4) (Z84 0)    4  Family history of Denial Of Any Significant Medical History    5  Family history of Diabetes    6  Family history of Black lung disease    7  Family history of Cancer    8  Family history of CAD (coronary artery disease)   9  Family history of Colon cancer   10  Denied: Family history of Crohn's disease   6  Family history of diabetes mellitus (V18 0) (Z83 3)   12  Denied: Family history of rheumatoid arthritis   13  Denied: Family history of systemic lupus erythematosus   14   Denied: Family history of ulcerative colitis    The family history was reviewed and updated today  Social History    · Caffeine use (V49 89) (F15 90)   · Currently sexually active   · Full-time employment   · High school or GED   · Never a smoker   · No drug use   · Occasional alcohol use   ·   The social history was reviewed and updated today  The social history was reviewed and is unchanged  End of Encounter Meds    1  ClonazePAM 1 MG Oral Tablet (KlonoPIN); TAKE 1/2 -1 TABLET EVERY 12 HOURS AS   NEEDED; Last Rx:25Mar2016 Ordered   2  Venlafaxine HCl  MG Oral Capsule Extended Release 24 Hour (Effexor XR); TAKE   1 CAPSULE DAILY  Requested for: 15BZZ3064; Last Rx:25Mar2016 Ordered    3  Dymista 137-50 MCG/ACT Nasal Suspension; INSTILL 2 SQUIRT Daily in each nostril; Therapy: 79CVD8541 to (Estelita Moe)  Requested for: 08ESC8335; Last   Rx:70Eiw1294 Ordered   4  Nabumetone 750 MG Oral Tablet; TAKE 1 TABLET TWICE DAILY AFTER MEALS; Therapy: 98PFJ4766 to (Estelita Moe)  Requested for: 66IEZ5449; Last   Rx:43Ykf4739 Ordered    5  BuPROPion HCl ER (XL) 150 MG Oral Tablet Extended Release 24 Hour (Wellbutrin XL);   1 tab po Qam;   Therapy: 42VUN4039 to (Last Rx:25Mar2016)  Requested for: 10DJL5960 Ordered   6  QUEtiapine Fumarate 50 MG Oral Tablet; 1 po qhs prn; Therapy: 94IMA9748 to (Last Rx:25Mar2016)  Requested for: 25Mar2016 Ordered   7  Wellbutrin  MG Oral Tablet Extended Release 24 Hour (BuPROPion HCl ER (XL));   1 po qam;   Therapy: 91OWC3885 to (Last Rx:68Zsb6817)  Requested for: 53KQF2095 Ordered    8  Verapamil HCl - 40 MG Oral Tablet; Take 1 tablet po BID; Therapy: 23CUV5670 to (Evaluate:17Jan2016)  Requested for: 53NCH5177; Last   Rx:19Oct2015 Ordered    9  Hydroxychloroquine Sulfate 200 MG Oral Tablet; take 1 tablet twice daily after meals; Therapy: 65XQO5163 to (Evaluate:05Jan2016)  Requested for: 42USS1324; Last   Rx:41Qzt1094 Ordered    10   Glucophage  MG Oral Tablet Extended Release 24 Hour (MetFORMIN HCl ER);    2,0mg total daily in AM (4 tabs) Recorded    11  Enbrel SureClick 50 MG/ML Subcutaneous Solution Auto-injector; INJECT 50 MG UNDER    THE SKIN ONCE WEEKLY AS DIRECTED; Last Rx:12Feb2016 Ordered   12  TraMADol HCl - 50 MG Oral Tablet; TAKE 1 TABLET 3 TIMES DAILY AS NEEDED;     Therapy: 77JDE3995 to (Last Rx:24Mar2016) Ordered    Future Appointments    Date/Time Provider Specialty Site   05/20/2016 03:00 PM Bushra Uriarte DO Rheumatology [address] [facility]     Signatures   Electronically signed by : Skyla Dawson, [facility]; Mar 25 2016  9:20AM EST                       (Author)    Electronically signed by : Kanwal Wolff MD; Mar 28 2016  4:51PM EST

## 2018-01-11 NOTE — PSYCH
Progress Note  Individual Psychotherapy 60 min minutes provided today  Goals addressed in session:   GOALS- MANAGING ANGER/ EFFECTIVE COMMUNICATION  Elijah Albert shares that since she is using her CPAP machine, she is amazed and relieved to report that she is feeling much better and she is getting a good night's sleep  Elijah Albert relays the events form Sy;s brothers wedding and the many things that frustrated and upset her  Elijah Albert described a number of conflicts that she and Carlos had in this day and since, in which Elijah Albert admits to be critical and unsympathetic towards [employer]  Through the course of the session, Elijah Albert was able to identify that she is frustrated with Sy;s lack of effort to become involved in trying to get better  Elijah Albert has also been reluctant to leave [employer] as she feels guilty as if she is abandoning him  Therapist reflected to Elijah Albert that her efforts to change [employer] may come across as controlling ans she agreed  therapist suggested trying reflective listening with [employer] and using "I" statements to help Carlos understand where she is coming from  Elijah Albert does identify that if things do not change, she may end up ending the relationship, which is not what she really wants to do  Current suicide risk is low   Assessment  Mood is less depressed but remains angry and irritated with [employer]  INsight is limited, however, Elijah Albert is able to hear feedback and consider a different approach with [employer]  Plan  See in 1 week- try to use IIFS meditation again to increase insight into mood and perspective  Signatures   Electronically signed by : Liliana Marin;  Apr 21 2016 10:03PM EST                       (Author)

## 2018-01-11 NOTE — PSYCH
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  Mayte Kay shares how much this means to her and how much better she is feeling , emotionally since she is having more hope for her future  Mayte Kay shares that she decided to go off all her psych meds, and that she actually feels better  SHe expresses that she has never been sure how much the medications have actually been helping her  SHe is still having difficulty w sleep and terrence be open to a med to help w this  She and Sy spent several days together and they were getting along well  Mayte Kay is not interested in resuming a relationship , however, she is feeling good that they are friendly to each other and Reji Dawson has been helpful w the move  Therapist raised the concern that Reji Dawson may be looking for something more and to be aware of this  Therapist discussed the [facility] and Mayte Kay is willing to do this next week with therapist       Edis Sahu is stable/euthymic, as Mayte Kay is actively taking steps towards her future  Plan  See weekly for continued support- next week- use Lynette to identify goals for [facility]  Signatures   Electronically signed by :  Liliana Obregon; Jun 1 0441 10:46PM EST                       (Author)

## 2018-01-12 NOTE — RESULT NOTES
Verified Results  * XR CHEST PA & LATERAL 59Uxa0135 05:40PM Myron Olson Order Number: [id number]     Test Name Result Flag Reference   XR CHEST PA & LATERAL (Report)     CHEST     INDICATION: Nonproductive cough, wheezing  COMPARISON: January 4, 2017     VIEWS: PA and lateral; 2 images     FINDINGS: Submaximal inspiration  The cardiomediastinal silhouette is unremarkable  The lungs are clear  No pleural effusions  Bony thorax unremarkable  IMPRESSION:     No active pulmonary disease          Workstation performed: [id number]     Signed by:   Romayne Cross Carnella Pipe, MD   4/18/17

## 2018-01-12 NOTE — PSYCH
Progress Note  Individual Psychotherapy 60 min minutes provided today  Goals addressed in session:   GOALS- Managing stress and pain  Frannie's mood is euthymic and stable- she is feeling "really good" lately- n nnot depressed  Cher Mcclain shares that she and Francheska Perdue are actually dating and it is going well, although she is still enjoying the freedom of her own place  Therapist completed the [facility] with her, which was helpful in identifying specific steps to take in order ot be more active with her RA  Cher Mcclain found it helpful  Cher Mcclain is denying any PTSD symptoms ot any depression  Discussed being able to reduce session to bi-wkly as Cher Mcclain as managed through the break up and moving, and things are generally feeling more hopeful for her  Current Pain Assessment: moderate to severe   On a scale of 0 to 10, the patient rates current pain at 5   Current suicide risk is low   Assessment  Mood is stable- not depressed and no PTSD symptoms are present at this time  Plan  Reduce to bi-wkly sessions and asses when ready to move to maintenance sessions  Signatures   Electronically signed by :  Liliana Ang; Jun 18 7496  2:42PM EST                       (Author)

## 2018-01-12 NOTE — PSYCH
Progress Note  Individual Psychotherapy 60min minutes provided today  Goals addressed in session:   Abr[address] 58 UP/LOSS  Rukhsana Dai had communicated with therapist over the wknd to let her know that she and Seng Marlow had broke up, and to get advice regarding concerns with Sy's safety  Rukhsana Dai relays to therapist the events which led up to the break up- stating that it was a mutual decision  Rukhsana Dai wrote a Letter to Sy's therapist outlining concerns about his behaviors and worries that he could be at risk for worsening of his depression and possible suicide  Rukhsana Dai read the letter aloud in session, and became tearful at points  Rukhsana Dai expressed her mixed feelings of anger and frustration and sadness  WHile Rukhsana Dai feels this is the right thing for her, she is sad about it- therapist validated this and discussed this as part of grief process  Current suicide risk is low   Assessment  Mood is sad and upset, consistent with a break up, but she is managing and not repressing her feelings  Plan  See weekly- support continued processing of the break up and steps towards moving forward in her life  Signatures   Electronically signed by : Liliana Collins;  Apr 30 2016  4:11PM EST                       (Author)

## 2018-01-12 NOTE — PSYCH
Progress Note  Individual Psychotherapy 60 min minutes provided today  Goals addressed in session:   [address] shares that about a number of ongoing stressors, including that her cat, [facility], has been sick  Ivana Reyes has also been having some flare ups with her RA  Ivana Reyes relays that she has not been able to take the brand name Wellbutrin, as prescribed by her PA- Cheyanne- as it was denied by insurance  SHe has made a number of attempts to call her psych office, with no return call  Ivana Reyes reports that while she has been feeling generally less depressed and anxious, she has also not been able to sleep well at night  Therapist offered to email her psych office about this  Therapist led Ivana Reyes in a therapeutic exercise using IFS model  Ivana Reyes was able to separate herself form her anger and anxiety and she found it very relaxing- therapist suggested that she look this up as a meditation to use for sleep and relaxation  On a scale of 0 to 10, the patient rates current pain at 5   Current suicide risk is low   Diagnosis and Treatment Plan explained to patient, patient relates understanding diagnosis and is agreeable to Treatment Plan  Assessment   Depression and anxiety have decreased, however, sleep has deteriorated     Plan  See weekly- Ivana Reyes will look up IFS meditations- therapist will email [facility]  Signatures   Electronically signed by :  Debbora Dakin, [place]; Mar 12 0791 12:18PM EST                       (Author)

## 2018-01-12 NOTE — PROGRESS NOTES
Assessment    1  Rheumatoid arthritis of multiple sites with negative rheumatoid factor (714 0) (M06 09)   2  Adalimumab (Humira) long-term use (V58 69) (Z79 899)   3  Long-term use of hydroxychloroquine (V58 69) (Z79 899)   4  NSAID long-term use (V58 64) (Z79 1)   5  Hypothyroidism (244 9) (E03 9)   6  CLARK (nonalcoholic steatohepatitis) (571 8) (K75 81)   7  MDD (major depressive disorder), recurrent episode, severe (296 33) (F33 2)   8  Migraine, unspecified, not intractable, without status migrainosus (346 90) (G43 909)   9  Obesity (278 00) (E66 9)    Plan    1  Enbrel SureClick 50 MG/ML Subcutaneous Solution Auto-injector; INJECT 50 MG   Weekly   2  Metoclopramide HCl - 10 MG Oral Tablet; one q6hrs prn nausea   3  (1) CBC/PLT/DIFF; Status:Active; Requested for:47Qat0711;    4  (1) COMPREHENSIVE METABOLIC PANEL; Status:Active; Requested for:98Elo6885;    5  (1) C-REACTIVE PROTEIN; Status:Active; Requested for:07Vit6630;    6  (1) SED RATE; Status:Active; Requested TWB:90VZV0651;    7  Follow-up visit in 2 months Evaluation and Treatment  Follow-up  Status: Hold For -   Scheduling  Requested for: 49Ijm9170    8  Humira Pen 40 MG/0 8ML Subcutaneous Pen-injector Kit    9  Hydroxychloroquine Sulfate 200 MG Oral Tablet; take 1 tablet twice daily after   meals    10  Call [phone] if: The pain seems worse ; Status:Complete;   Done: 70ZLB1051   11  Call [phone] if: The symptoms seem worse ; Status:Complete;   Done:    27GFX7284   12  Call [phone] if: You have questions or concerns about your problem ;    Status:Complete;   Done: 68TOP9454    Discussion/Summary    Ms  Leader has been feeling tolerable since her last evaluation  She reports that since she started on the Humira, she has been having more frequent migraines  She is also been having significant nausea and vomiting after taking the Humira injections  She complains of burning pain in the tops of her feet mainly at night   In addition to her side effects from the Humira, she is also had difficulty getting the medication from prior specialty pharmacy  She is interested in trying Enbrel again, as she did find this to be more efficacious, and without as many side effects  She reports that her last Humira injection was about one week ago  She is complaining of some pain in her left great toe, and she did see podiatry for this  She was diagnosed with sesamoiditis  She complains of swelling in her feet  She denies any other significant joint pain  She does report that her hands feel swollen at times  She reports morning stiffness typically lasting 20-25 minutes before improvement  She also reports difficulty sleeping because of pain, but she denies nonrestorative sleep  She also reports occasional fatigue  On exam, there is mild synovitis of the bilateral ankles  There is no other active synovitis  No recent rheumatologic labs were available for review today  A previous Vectra DA test did show moderate disease activity  At this time, her rheumatoid arthritis does appear mildly active despite Humira, and she is having significant adverse effects from the Humira  Therefore, we will discontinue the Humira and restart Enbrel 50 mg subcutaneously every week  We will obtain an updated prior authorization for this  She will continue the Plaquenil as prescribed  She is due for an updated eye exam with visual fields, as her last test was about 6 months ago  I would like to recheck a CBC, CMP, ESR, and CRP before her follow-up  I will reevaluate her in 2 months  She will call in the interim if there are any questions or concerns  Counseling  Rheumatology Counseling Documentation: The patient and patient's family was counseled regarding instructions for management, impressions and risks and benefits of treatment options  Chief Complaint  F/U RA   Patient is here today for follow up of chronic conditions described in HPI        History of Present Illness  Pt  returns for F/U for RA  Feeling terrible since last visit  c/o significant migraines since starting Humira  Also having nausea after injections as well  c/o burning in tops of feet at night  + vomiting with the nausea  Has known history of migraines, but not as often as they are occurring  Still having difficulty trying getting Humira from specialty pharmacy  Interested in trying Enbrel again  Last Humira about 1 week ago  c/o pain in L great toe -> diagnosed with sesamoiditis  + swelling in feet  No other significant joint pain  + AM stiffness x 20-25 minutes  + occasional feeling of swelling in hands  + difficulty sleeping due to pain  No non-restorative sleep  + occasional fatigue  Last eye exam in 2/2016  RAPID3: 15 0/30      Review of Systems    Constitutional: fever and chills, but no recent weight gain, no recent weight loss and no anorexia    The patient presents with complaints of occasional episodes of fatigue  HEENT: dryness of the eyes, but no blurred vision, no double vision, no amaurosis fugax, no eye pain, no erythema eye(s), no dryness mouth, no mouth sores, not feeling congested and no sore throat  Cardiovascular: no chest pain or pressure, no dyspnea on exertion and no swelling in the arms or legs  Respiratory: no unusual or persistent cough, no shortness of breath and no pleurisy  Gastrointestinal: nausea, vomiting and heartburn, but no abdominal pain, no diarrhea, no constipation, no melena and no BRBPR  Genitourinary: no foamy urine, but no dysuria and no hematuria  Musculoskeletal: as noted in HPI  Integumentary rash, but no Raynaud's, no alopecia, no nail changes and no photosensitivity  Endocrine no polyuria and no polydipsia  Hematologic/Lymphatic: no unusual bleeding and no tendency for easy bruising     Neurological: headache and tingling, but no weakness    The patient presents with complaints of vertigo or dizziness, described as sensation of movement starting about 1 day ago  Psychiatric: insomnia, but no non-restorative sleep  Active Problems    1  Adalimumab (Humira) long-term use (V58 69) (Z79 899)   2  Anxiety (300 00) (F41 9)   3  Benign neoplasm of pituitary gland (227 3) (D35 2)   4  Chondromalacia, patella (717 7) (M22 40)   5  Depression (311) (F32 9)   6  Exertional dyspnea (786 09) (R06 09)   7  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)   8  High protein C activity level (790 92) (R79 1)   9  Hyperlipidemia (272 4) (E78 5)   10  Hyperprolactinemia (253 1) (E22 1)   11  Hypothyroidism (244 9) (E03 9)   12  Left foot pain (729 5) (M79 672)   13  Long-term use of hydroxychloroquine (V58 69) (Z79 899)   14  MDD (major depressive disorder), recurrent episode, severe (296 33) (F33 2)   15  Migraine (346 90) (G43 909)   16  Migraine, unspecified, not intractable, without status migrainosus (346 90) (G43 909)   17  Mood disorder (296 90) (F39)   18  CLARK (nonalcoholic steatohepatitis) (571 8) (K75 81)   19  NSAID long-term use (V58 64) (Z79 1)   20  Obesity (278 00) (E66 9)   21  Pain, foot (729 5) (M79 673)   22  Pain, joint, multiple sites (719 49) (M25 50)   23  Pituitary microadenoma (227 3) (D35 2)   24  Polycystic ovarian syndrome (256 4) (E28 2)   25  Post traumatic stress disorder (PTSD) (309 81) (F43 10)   26  Prediabetes (790 29) (R73 09)   27  Rheumatoid arthritis of multiple sites with negative rheumatoid factor (714 0) (M06 09)   28  Vitamin D deficiency (268 9) (E55 9)    Past Medical History    1  History of Abdominal pain, RLQ (right lower quadrant) (789 03) (R10 31)   2  History of Abnormal LFTs (liver function tests) (790 6) (R79 89)   3  History of Abnormal weight gain (783 1) (R63 5)   4  History of Acute knee pain (719 46) (M25 569)   5  History of Acute tonsillitis (463) (J03 90)   6  History of Cat bite (879 8,E906 3) (W55 01XA)   7  History of Common migraine without aura (346 10) (G43 009)   8   History of Depression with anxiety (300 4) (F41 8)   9  History of Diabetes Mellitus (250 00)   10  History of Elevated C-reactive protein (790 95) (R79 82)   11  History of Fatigue (780 79) (R53 83)   12  History of Hip pain (719 45) (M25 559)   13  History of common cold (V12 09) (Z86 19)   14  History of diabetes mellitus (V12 29) (Z86 39)   15  History of headache (V13 89) (Z87 898)   16  History of polyarthritis (V13 4) (Z87 39)   17  History of rheumatoid arthritis (V13 4) (Z87 39)   18  History of sinusitis (V12 69) (Z87 09)   19  History of Need for influenza vaccination (V04 81) (Z23)   20  History of Numbness (782 0) (R20 0)   21  History of On etanercept therapy (V58 69) (Z79 899)   22  History of Primary stabbing headache (339 85) (G44 85)   23  History of Seronegative rheumatoid arthritis (714 0) (M06 00)   24  History of Sprain of ankle, left (845 00) (S93 402A)   25  History of Tendonitis (726 90) (M77 9)   26  History of Tension type headache (339 10) (G44 209)   27  History of Tingling in extremities (782 0) (R20 2)   28  History of TMJ Appearance   29  History of Trochanteric bursitis of right hip (726 5) (M70 61)   30  History of Vaginal candidiasis (112 1) (B37 3)   31  History of Viral URI with cough (465 9) (J06 9,B97 89)    The active problems and past medical history were reviewed and updated today  Surgical History    1  History of Dental Surgery   2  History of Nasal Septal Deviation Repair   3  History of Oral Surgery Tooth Extraction   4  History of Tonsillectomy    The surgical history was reviewed and updated today  Family History  Mother    1  Family history of Denial Of Any Significant Medical History   2  Family history of arthritis (V17 7) (Z82 61)   3  Family history of psoriasis (V19 4) (Z84 0)  Father    4  Family history of Denial Of Any Significant Medical History  Paternal Grandmother    5  Family history of Diabetes  Maternal Grandfather    6   Family history of Black lung disease  Paternal Grandfather    7  Family history of Cancer  Family History    8  Family history of CAD (coronary artery disease)   9  Family history of Colon cancer   10  Denied: Family history of Crohn's disease   6  Family history of diabetes mellitus (V18 0) (Z83 3)   12  Denied: Family history of rheumatoid arthritis   13  Denied: Family history of systemic lupus erythematosus   14  Denied: Family history of ulcerative colitis    The family history was reviewed and updated today  Social History    · Caffeine use (V49 89) (F15 90)   · Currently sexually active   · Full-time employment   · High school or GED   · Never a smoker   · No drug use   · Occasional alcohol use   · Patient has living will (V49 89) (Z78 9)   ·   The social history was reviewed and updated today  The social history was reviewed and is unchanged  Current Meds   1  ClonazePAM 1 MG Oral Tablet; TAKE 1/2 -1 TABLET EVERY 12 HOURS AS NEEDED; Last   Rx:13Mbu9839 Ordered   2  Dymista 137-50 MCG/ACT Nasal Suspension; INSTILL 2 SQUIRT Daily in each nostril; Therapy: 91DJR2885 to ([facility])  Requested for: 61QBG7474; Last   Rx:10Ovm7047 Ordered   3  Ergocalciferol 55194 UNIT Oral Capsule; TAKE 1 CAPSULE ONCE WEEKLY; Therapy: (Recorded:21Ibw4923) to Recorded   4  Glucophage  MG Oral Tablet Extended Release 24 Hour; 2,0mg total daily in AM   (4 tabs) Recorded   5  Humira Pen 40 MG/0 8ML Subcutaneous Pen-injector Kit; Inject 1 pen SC Q other week   as directed; Therapy: 94GNI8573 to (Renew:16Nov2016); Last CM:47FYZ7259 Ordered   6  Hydroxychloroquine Sulfate 200 MG Oral Tablet; take 1 tablet twice daily after meals; Therapy: 46WIG2154 to (Evaluate:16Nov2016)  Requested for: 59GRR2430; Last   Rx:39Yjw4535 Ordered   7  Nabumetone 750 MG Oral Tablet; TAKE 1 TABLET TWICE DAILY AFTER MEALS; Therapy: 14IAQ9827 to ([facility])  Requested for: 89EOG9714; Last   Rx:15Wnq8630 Ordered   8   Ondansetron HCl - 4 MG Oral Tablet; one tablet every 8 hours as needed for nausea; Therapy: 44OID4469 to (Last Rx:30Mar2016)  Requested for: 41VNO8776 Ordered   9  QUEtiapine Fumarate 50 MG Oral Tablet; TAKE 1 TABLET Bedtime PRN insomnia; Therapy: 63UAN2797 to ([facility])  Requested for: 95GMP5500; Last   Rx:07Jib0402 Ordered   10  TraMADol HCl - 50 MG Oral Tablet; Take as directed by ordering provider; Therapy: (Recorded:25Mar2016) to Recorded   11  Verapamil HCl - 40 MG Oral Tablet; Take 1 tablet twice daily; Therapy: 86AES0577 to (Evaluate:07Apr2017)  Requested for: 33SNF2449; Last    Rx:30Huy8175 Ordered    The medication list was reviewed and updated today  Immunizations  Influenza --- Katlin Yolis: [id number] Sago: [id number] Buenrostro: [id number]   PCV --- Series1: 02-Feb-2016   Td/DT --- Series1: 01-Jun-2006     Allergies    1  Augmentin TABS    Vitals  Signs   Recorded: 67JKO3769 71:43YN   Systolic: 633  Diastolic: 84  Heart Rate: 100  Weight: 267 lb   BMI Calculated: 43 1  BSA Calculated: 2 26    Physical Exam    Constitutional   General appearance: Abnormal   morbidly obese and appears tired  Eyes   Conjunctiva and lids: No swelling, erythema or discharge  Pupils and irises: Equal, round and reactive to light  Ears, Nose, Mouth, and Throat   External inspection of ears and nose: Normal     Oropharynx: Normal with no erythema, edema, exudate lesions, or ulcers  Pulmonary   Respiratory effort: No increased work of breathing or signs of respiratory distress  Auscultation of lungs: Clear to auscultation  Cardiovascular   Auscultation of heart: Normal rate and rhythm, normal S1 and S2, without murmurs  Examination of extremities for edema and/or varicosities: Normal     Lymphatic   Palpation of lymph nodes in neck: No lymphadenopathy  Psychiatric   Orientation to person, place, and time: Normal     Mood and affect: Normal       Right 5th MTP tenderness  Right ankle tenderness and swelling     Left ankle tenderness and swelling  Musculoskeletal - Joints, bones, and muscles: Abnormal  Palpation - bilateral knee crepitus     Skin - Skin and subcutaneous tissue: Normal    Neurologic - Sensation: Normal       Future Appointments    Date/Time Provider Specialty Site   10/12/2016 08:00 AM Oneyda Browning DO Family Medicine [address] [place] [address]   10/14/2016 03:20 PM Bushra Uriarte DO Rheumatology [facility] [address] [facility]     Signatures   Electronically signed by : Bishop Manuelito DO; Aug 12 2016  1:50PM EST                       (Author)

## 2018-01-12 NOTE — PSYCH
Progress Note  Individual Psychotherapy 45 min minutes provided today  Goals addressed in session:   GOALS- processing loss and taking steps towards future  Cynthia mood is stable, she is excited about moving into her new apartment this wknd  Elijah Albert still has questions about how and why the relationship w Karin Koenig ended, but she is moving closer to acceptance and being able to move on  Karin Gills contacted her and took her out to dinner- he was open nd talking to her about a number of things  This was frustrating for Elijah Albert, as she wanted him to communicate like this when they were together  Elijah Albert has decided that they can;'t be friends at this point, and does not plan to continue to interact with Karin Koenig on any regular basis  Elijah Albert has made place to remain busy with knitting, looking into yoga, and she is also attending a Ladies bible Study at a [employer]  She has not heard yet from [facility], but will contact them again about her interest in the [facility]          Current suicide risk is low   Assessment  Mood is stable, less angry and depressed- moving towards acceptance about the end of this relationship  Plan  See weekly for continued support as she adjusts to being single  again  Signatures   Electronically signed by :  Liliana Marin; May 29 2008  3:20PM EST                       (Author)

## 2018-01-13 NOTE — PSYCH
Progress Note  Individual Psychotherapy 60 min minutes provided today  Goals addressed in session:   GOALS- Following through with [employer]  Geni Spears shares w therapist that she has started her online schooling and she is really enjoying it  Geni Spears discusses the options that her schooling is offering  Geni Spears has some apprehension about the next 7 week class which will be more demanding  Discussed balancing wrk and school with self-care  Geni Spears has applied for a new potion and is waiting to hear  Geni Spears processes feelings related to Carron Leavens- they are spending time as friends and Akashtroy Shearers is finding herself becoming frustrated again- which is a signal for her to take some steps back  Current suicide risk is low   Assessment  Mood is stable and euthymic, Geni Spears is excited abut taking this step towards a better career  Plan  See in 1 month for maintenance  Signatures   Electronically signed by :  Liliana Sykes Caro; Sep 12 1226 10:27PM EST                       (Author)

## 2018-01-13 NOTE — PSYCH
Progress Note  Individual Psychotherapy 60 min minutes provided today  Goals addressed in session:   57 Hossein Funes comes to session for the first time in several months due to conflicts in scheduling and because she had been doing well emotionally  Boris Phelps shares that things w her new boyfriend have recently fell apart- she has aught him in a number of lies and she has also seen him loose his temper and become harsh with her at times, which she interprets as red flags that this man may have more problems that she was aware of   Boris Lenin cries in session and she shares how "unfair " it is, that she feels she is doomed to be aloe and she misses Jean-Pierre Mann- she shares how she knows they would still be together and would not have these problems  Therapist validated her feelings and helped Tobin Vazquez sort out how she wants to handle this  Current suicide risk is low   Diagnosis and Treatment Plan explained to patient, patient relates understanding diagnosis and is agreeable to Treatment Plan  Assessment  Mood is mildly to moderate depressed in connection with the need to end this relationship  Otherwise, Boris Phepls has been doing well post bariatric Surgery ad has lost alot tof weight and is feeling good about this  Plan  Agreed that next session will be in 2 months- this is for maintenance  Boris Phelps identified some proactive steps she can take to remain busy and not isolated in the winter  Signatures   Electronically signed by :  Liliana Licea; Sep 30 0712  4:29PM EST                       (Author)

## 2018-01-13 NOTE — RESULT NOTES
Verified Results  (1) URIC ACID [id number] 11:36AM Jhoan Mini   TW Order Number: [id number]     Test Name Result Flag Reference   URIC ACID 5 4 mg/dL  2 0-6 8   Specimen collection should occur prior to Metamizole administration due to the potential for falsely depressed results

## 2018-01-13 NOTE — RESULT NOTES
Verified Results  (1) LIPID PANEL, FASTING 27Apr2016 08:33AM Quyen Zabala    Order Number: [id number]  Triglyceride:         Normal              <150 mg/dl       Borderline High    150-199 mg/dl       High               200-499 mg/dl       Very High          >499 mg/dl  Cholesterol:         Desirable        <200 mg/dl      Borderline High  200-239 mg/dl      High             >239 mg/dl  HDL Cholesterol:        High    >59 mg/dL      Low     <41 mg/dL  LDL CALCULATED:    This screening LDL is a calculated result  It does not have the accuracy of the Direct Measured LDL in the monitoring of patients with hyperlipidemia and/or statin therapy  Direct Measure LDL (MZH730) must be ordered separately in these patients  Test Name Result Flag Reference   CHOLESTEROL 231 mg/dL H    HDL,DIRECT 26 mg/dL L 40-60   Specimen collection should occur prior to Metamizole administration due to the potential for falsely depressed results  LDL CHOLESTEROL CALCULATED 141 mg/dL H 0-100   TRIGLYCERIDES 320 mg/dL H <=150   Specimen collection should occur prior to N-Acetylcysteine or Metamizole administration due to the potential for falsely depressed results

## 2018-01-13 NOTE — PROGRESS NOTES
Assessment    1  Rheumatoid arthritis of multiple sites with negative rheumatoid factor (714 0) (M06 09)   2  Adalimumab (Humira) long-term use (V58 69) (Z79 899)   3  Long-term use of hydroxychloroquine (V58 69) (Z79 899)   4  NSAID long-term use (V58 64) (Z79 1)   5  Hypothyroidism (244 9) (E03 9)   6  CLARK (nonalcoholic steatohepatitis) (571 8) (K75 81)   7  MDD (major depressive disorder), recurrent episode, severe (296 33) (F33 2)   8  Migraine, unspecified, not intractable, without status migrainosus (346 90) (G43 909)   9  Obesity (278 00) (E66 9)    Plan    1  (1) CBC/PLT/DIFF; Status:Active; Requested GYK:[id number];    2  (1) COMPREHENSIVE METABOLIC PANEL; Status:Active; Requested for:26Oct2016;    3  (1) C-REACTIVE PROTEIN; Status:Active; Requested PZQ:[id number];    4  (1) SED RATE; Status:Active; Requested YHT:[id number];    5  Follow-up visit in 3 months Evaluation and Treatment  Follow-up  Status: Hold For -   Scheduling  Requested for: 76QZP0132    1  Enbrel SureClick 50 MG/ML Subcutaneous Solution Auto-injector; INJECT 50   MG Weekly    7  Nabumetone 750 MG Oral Tablet; TAKE 1 TABLET TWICE DAILY AFTER MEALS    8  Hydroxychloroquine Sulfate 200 MG Oral Tablet; take 1 tablet twice daily after   meals    9  Call [phone] if: The pain seems worse ; Status:Complete;   Done: 26Oct2016   10  Call [phone] if: The symptoms seem worse ; Status:Complete;   Done:    26Oct2016   11  Call [phone] if: You have questions or concerns about your problem ;    Status:Complete;   Done: 26Oct2016   12  Transcut/Neuromusc Electr Nerve Stim TENS; Status:Complete;   Done: 76UKW8973    68  Ergocalciferol 74732 UNIT Oral Capsule; TAKE 1 CAPSULE ONCE WEEKLY    Discussion/Summary    This is a 16-year-old female presenting today for follow-up for rheumatoid arthritis  Patient states that she's been doing well since her last appointment   She has noticed increased neck discomfort which she describes as tightness in her neck as well as occasional swelling in her hands and feet  She states that she has had less migraines since starting Enbrel again  She describes stiffness in her hands and feet as well and has purchased insoles for her shoes which have also helped  She states that she is no other obvious joint swelling at this time and does describe morning stiffness lasting 15-20 minutes  She is difficulty with sleep occasionally due to pain as well as occasional nonrestorative sleep and fatigue throughout the day  On physical exam, there is synovitis of the PIPs bilaterally as well as bilateral ankles  Patient does have tenderness the PIPs and DIPs bilaterally as well as the cervical spine and the ankles bilaterally  There is crepitus of bilateral knees  Patient's labs reveal an elevated CRP of 9 1  Patient continues to have elevated liver enzymes unchanged since previous study and all other labs are within normal range  At this time patient's history and physical examination is most consistent with rheumatoid arthritis which appears to be mildly active at this time  Patient states that she feels she is doing well with the use of Enbrel and Plaquenil as well as nabumetone  She would like to continue with her current regimen at this time  In addition patient was given an order for a TENS unit to use for her upper extremity and neck pain  Patient will repeat a CBC, CMP, CRP, and ESR before her next follow-up in 3 months time  She will contact the office in the interim if she has any further questions or concerns  We will continue to monitor patient's inflammatory markers and if they remain elevated we will consider changing her biologic to [place]  The patient was counseled regarding diagnostic results, instructions for management, prognosis, patient and family education, risks and benefits of treatment options, importance of compliance with treatment        Chief Complaint  F/U RA   Patient is here today for follow up of chronic conditions described in HPI  History of Present Illness  Patient is in the office today for follow up for RA  Feeling okay at this time  Having increased pain in the neck  Sensation of tightness in the neck  Swelling in the hands still  Still with Enbrel but without migrans  Stiffness in the hands and feet  Got insoles for shoes with some relief  Swelling in the feet as well  No other obvious joint swelling  +Am stiffness x 15-20 minutes  +Difficulty with sleep due to pain  +Occasional non restorative sleep  +Fatigue  RAPID3:14 8 /30      Review of Systems    Constitutional: fatigue, but no fever, no recent weight gain, no chills, no recent weight loss and no anorexia  HEENT: dryness of the eyes, but no blurred vision, no double vision, no amaurosis fugax, no eye pain, no erythema eye(s), no dryness mouth, no mouth sores, not feeling congested and no sore throat  Cardiovascular: no chest pain or pressure, no dyspnea on exertion and no swelling in the arms or legs  Respiratory: no unusual or persistent cough, no shortness of breath and no pleurisy  Gastrointestinal: no abdominal pain, no nausea, no vomiting, no heartburn, no diarrhea, no constipation, no melena and no BRBPR  Genitourinary: No foamy urine, but no dysuria and no hematuria  Musculoskeletal: as noted in HPI  Integumentary rash, but no Raynaud's, no alopecia, no nail changes and no photosensitivity  Endocrine no polyuria and no polydipsia  Hematologic/Lymphatic: no unusual bleeding and no tendency for easy bruising  Neurological: headache and weakness, but no vertigo or dizziness and no tingling  Psychiatric: insomnia and non-restorative sleep  Active Problems    1  Adalimumab (Humira) long-term use (V58 69) (Z79 899)   2  Anxiety (300 00) (F41 9)   3  Benign neoplasm of pituitary gland (227 3) (D35 2)   4  Chondromalacia, patella (717 7) (M22 40)   5   Controlled type 2 diabetes mellitus with diabetic nephropathy, without long-term current   use of insulin (250 40,583 81) (E11 21)   6  Depression (311) (F32 9)   7  Exertional dyspnea (786 09) (R06 09)   8  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)   9  High protein C activity level (790 92) (R79 1)   10  High risk medication use (V58 69) (Z79 899)   11  Hyperlipidemia (272 4) (E78 5)   12  Hyperprolactinemia (253 1) (E22 1)   13  Hypothyroidism (244 9) (E03 9)   14  Left foot pain (729 5) (M79 672)   15  Long-term use of hydroxychloroquine (V58 69) (Z79 899)   16  MDD (major depressive disorder), recurrent episode, severe (296 33) (F33 2)   17  Migraine (346 90) (G43 909)   18  Migraine, unspecified, not intractable, without status migrainosus (346 90) (G43 909)   19  Mood disorder (296 90) (F39)   20  CLARK (nonalcoholic steatohepatitis) (571 8) (K75 81)   21  Need for influenza vaccination (V04 81) (Z23)   22  NSAID long-term use (V58 64) (Z79 1)   23  Obesity (278 00) (E66 9)   24  Pain, foot (729 5) (M79 673)   25  Pain, joint, multiple sites (719 49) (M25 50)   26  Pituitary microadenoma (227 3) (D35 2)   27  Polycystic ovarian syndrome (256 4) (E28 2)   28  Post traumatic stress disorder (PTSD) (309 81) (F43 10)   29  Prediabetes (790 29) (R73 09)   30  Primary stabbing headache (339 85) (G44 85)   31  Rheumatoid arthritis of multiple sites with negative rheumatoid factor (714 0) (M06 09)   32  Vitamin D deficiency (268 9) (E55 9)    Past Medical History    1  History of Abdominal pain, RLQ (right lower quadrant) (789 03) (R10 31)   2  History of Abnormal LFTs (liver function tests) (790 6) (R79 89)   3  History of Abnormal weight gain (783 1) (R63 5)   4  History of Acute knee pain (719 46) (M25 569)   5  History of Acute tonsillitis (463) (J03 90)   6  History of Cat bite (879 8,E906 3) (W55 01XA)   7  History of Common migraine without aura (346 10) (G43 009)   8  History of Depression with anxiety (300 4) (F41 8)   9   History of Diabetes Mellitus (250 00) 10  History of Elevated C-reactive protein (790 95) (R79 82)   11  History of Fatigue (780 79) (R53 83)   12  History of Hip pain (719 45) (M25 559)   13  History of common cold (V12 09) (Z86 19)   14  History of diabetes mellitus (V12 29) (Z86 39)   15  History of headache (V13 89) (Z87 898)   16  History of polyarthritis (V13 4) (Z87 39)   17  History of rheumatoid arthritis (V13 4) (Z87 39)   18  History of sinusitis (V12 69) (Z87 09)   19  History of Need for influenza vaccination (V04 81) (Z23)   20  History of Numbness (782 0) (R20 0)   21  History of On etanercept therapy (V58 69) (Z79 899)   22  History of Seronegative rheumatoid arthritis (714 0) (M06 00)   23  History of Sprain of ankle, left (845 00) (S93 402A)   24  History of Tendonitis (726 90) (M77 9)   25  History of Tension type headache (339 10) (G44 209)   26  History of Tingling in extremities (782 0) (R20 2)   27  History of TMJ Appearance   28  History of Trochanteric bursitis of right hip (726 5) (M70 61)   29  History of Vaginal candidiasis (112 1) (B37 3)   30  History of Viral URI with cough (465 9) (J06 9,B97 89)    The active problems and past medical history were reviewed and updated today  Surgical History    1  History of Dental Surgery   2  History of Nasal Septal Deviation Repair   3  History of Oral Surgery Tooth Extraction   4  History of Tonsillectomy    The surgical history was reviewed and updated today  Family History  Mother    1  Family history of Denial Of Any Significant Medical History   2  Family history of arthritis (V17 7) (Z82 61)   3  Family history of psoriasis (V19 4) (Z84 0)  Father    4  Family history of Denial Of Any Significant Medical History  Paternal Grandmother    5  Family history of Diabetes  Maternal Grandfather    6  Family history of Black lung disease  Paternal Grandfather    9  Family history of Cancer  Family History    8  Family history of CAD (coronary artery disease)   9   Family history of Colon cancer   10  Denied: Family history of Crohn's disease   6  Family history of diabetes mellitus (V18 0) (Z83 3)   12  Denied: Family history of rheumatoid arthritis   13  Denied: Family history of systemic lupus erythematosus   14  Denied: Family history of ulcerative colitis    The family history was reviewed and updated today  Social History    · Caffeine use (V49 89) (F15 90)   · Currently sexually active   · Full-time employment   · High school or GED   · Never a smoker   · No drug use   · Occasional alcohol use   · Patient has living will (V49 89) (Z78 9)   ·   The social history was reviewed and updated today  The social history was reviewed and is unchanged  Current Meds   1  ClonazePAM 1 MG Oral Tablet; TAKE 1/2 -1 TABLET EVERY 12 HOURS AS NEEDED; Last   Rx:12Oct2016 Ordered   2  Dymista 137-50 MCG/ACT Nasal Suspension; INSTILL 2 SQUIRT Daily in each nostril; Therapy: 94QHG0553 to ([id number])  Requested for: 03XHN9141; Last   Rx:24Sep2015 Ordered   3  Enbrel SureClick 50 MG/ML Subcutaneous Solution Auto-injector; INJECT 50 MG Weekly; Therapy: 12Aug2016 to (Evaluate:27Jan2017); Last Rx:12Aug2016 Ordered   4  Ergocalciferol 74003 UNIT Oral Capsule; TAKE 1 CAPSULE ONCE WEEKLY; Therapy: (Recorded:26Oct2016) to Recorded   5  Glucophage  MG Oral Tablet Extended Release 24 Hour; 2,0mg total daily in AM   (4 tabs) Recorded   6  Hydroxychloroquine Sulfate 200 MG Oral Tablet; take 1 tablet twice daily after meals; Therapy: 12AGM5439 to (Evaluate:40Ksq0863)  Requested for: 12Aug2016; Last   Rx:12Aug2016 Ordered   7  Indomethacin 25 MG Oral Capsule; 1 capsule TID prn headache with food and water  Do   not take with advil; Therapy: [facility]BQY7118 [facility])  Requested for: 01Sep2016; Last   Rx:01Sep2016 Ordered   8  Metoclopramide HCl - 10 MG Oral Tablet; one q6hrs prn nausea;    Therapy: 66Qwb5310 to (Evaluate:21Oqs7013)  Requested for: 96Ecz5113; Last   Rx:25Xsw3879 Ordered   9  Nabumetone 750 MG Oral Tablet; TAKE 1 TABLET TWICE DAILY AFTER MEALS; Therapy: 76ZWI0932 to (Lino Saenz)  Requested for: 66FBS3056; Last   Rx:21Pmy7008 Ordered   10  Ondansetron HCl - 4 MG Oral Tablet; one tablet every 8 hours as needed for nausea; Therapy: 17DYA6432 to (Last Rx:30Mar2016)  Requested for: 35JWT6383 Ordered   11  QUEtiapine Fumarate 50 MG Oral Tablet; TAKE 1 TABLET AT BEDTIME; Therapy: 49CKU9133 to (21 234 )  Requested for: 62GTW2489; Last    Rx:12Oct2016 Ordered   12  Rosuvastatin Calcium 5 MG Oral Tablet; Take 1 tablet daily; Therapy: 69LTW8868 to (21 234 )  Requested for: 25Oct2016; Last    Rx:25Oct2016 Ordered   13  Sucralfate 1 GM Oral Tablet; Take 1 tablet 30 minutes prior to taking indomethacin; Therapy: 00DGN7800 to (21 )  Requested for: 01Sep2016; Last    Rx:01Sep2016 Ordered   14  SUMAtriptan Succinate 6 MG/0 5ML Subcutaneous Solution; inject 0 5ml at onset of    headache and may repeat in 2 hours if needed, max 2inj/day max 3days/week; Therapy: 50Gjp5152 to (Last Rx:01Sep2016)  Requested for: 01Sep2016 Ordered   15  TraMADol HCl - 50 MG Oral Tablet; Take as directed by ordering provider; Therapy: (Recorded:25Mar2016) to Recorded   16  Verapamil HCl  MG Oral Capsule Extended Release 24 Hour; take 1 capsule    qhs;    Therapy: 01Sep2016 to (21 )  Requested for: 01Sep2016; Last    Rx:01Sep2016 Ordered    The medication list was reviewed and updated today  Immunizations  Influenza --- [place] Moris: Temporarily Deferred: Pt refuses, As of: 55LQR6090, Defer for 1 Years; Chanelle Tonyr: [id number]-Ckt-4410Xyopdsn Vinod: 68-Qxt-5508Fjdw Goodie: 12-Oct-2016; Series5: [id number]   PCV --- Series1: 02-Feb-2016   Td/DT --- Series1: 01-Jun-2006     Allergies    1   Augmentin TABS    Vitals  Signs   Recorded: 48XAG2536 07:15DB   Systolic: 318  Diastolic: 68  Heart Rate: 68  Weight: 271 lb   BMI Calculated: 43 74  BSA Calculated: 2 28    Physical Exam    Constitutional   General appearance: Abnormal   obese  Eyes   Conjunctiva and lids: No swelling, erythema or discharge  Pupils and irises: Equal, round and reactive to light  Ears, Nose, Mouth, and Throat   External inspection of ears and nose: Normal     Oropharynx: Normal with no erythema, edema, exudate lesions, or ulcers  Pulmonary   Respiratory effort: No increased work of breathing or signs of respiratory distress  Auscultation of lungs: Clear to auscultation  Cardiovascular   Auscultation of heart: Normal rate and rhythm, normal S1 and S2, without murmurs  Examination of extremities for edema and/or varicosities: Normal     Lymphatic   Palpation of lymph nodes in neck: No lymphadenopathy  Psychiatric   Orientation to person, place, and time: Normal     Mood and affect: Normal         Right ankle tenderness and swelling  Left ankle tenderness and swelling  Musculoskeletal - Joints, bones, and muscles: Abnormal  Palpation - C5, C6 and C7 tenderness and bilateral knee crepitus  The patient has tenderness in all PIP and DIP joints of the right hand  The patient has tenderness in all PIP and DIP joints of the left hand  The patient has swelling of all PIP joints of the right hand  The patient has swelling of all PIP joints of the left hand  Right foot: All MTP, PIP and DIP joints are normal  Left foot: All MTP, PIP and DIP joints are normal       Results/Data  (1) COMPREHENSIVE METABOLIC PANEL 81YJZ1701 49:39DL Lucía SORIA Order Number: [id number]     Test Name Result Flag Reference   GLUCOSE,RANDM 109 mg/dL     If the patient is fasting, the ADA then defines impaired fasting glucose as > 100 mg/dL and diabetes as > or equal to 123 mg/dL     SODIUM 134 mmol/L L 136-145   POTASSIUM 4 3 mmol/L  3 5-5 3   CHLORIDE 104 mmol/L  100-108   CARBON DIOXIDE 22 mmol/L  21-32   ANION GAP (CALC) 8 mmol/L  4-13 BLOOD UREA NITROGEN 9 mg/dL  5-25   CREATININE 1 01 mg/dL  0 60-1 30   Standardized to IDMS reference method   CALCIUM 8 9 mg/dL  8 3-10 1   BILI, TOTAL 0 59 mg/dL  0 20-1 00   ALK PHOSPHATAS 71 U/L     ALT (SGPT) 213 U/L H 12-78   AST(SGOT) 190 U/L H 5-45   ALBUMIN 3 7 g/dL  3 5-5 0   TOTAL PROTEIN 8 1 g/dL  6 4-8 2   eGFR Non-African American      >60 0 ml/min/1 73sq m   - Patient Instructions: This is a fasting blood test  Water,black tea or black  coffee only after 9:00pm the night before test Drink 2 glasses of water the morning of test   National Kidney Disease Education Program recommendations are as follows:  GFR calculation is accurate only with a steady state creatinine  Chronic Kidney disease less than 60 ml/min/1 73 sq  meters  Kidney failure less than 15 ml/min/1 73 sq  meters  (1) CBC/PLT/DIFF 20Oct2016 08:25AM [facility] Order Number: [id number]     Test Name Result Flag Reference   WBC COUNT 10 60 Thousand/uL H 4 31-10 16   RBC COUNT 4 78 Million/uL  3 81-5 12   HEMOGLOBIN 14 5 g/dL  11 5-15 4   HEMATOCRIT 43 4 %  34 8-46  1   MCV 91 fL  82-98   MCH 30 3 pg  26 8-34 3   MCHC 33 4 g/dL  31 4-37 4   RDW 13 4 %  11 6-15 1   MPV 11 5 fL  8 9-12 7   PLATELET COUNT 357 Thousands/uL  149-390   nRBC AUTOMATED 0 /100 WBCs     NEUTROPHILS RELATIVE PERCENT 50 %  43-75   LYMPHOCYTES RELATIVE PERCENT 41 %  14-44   MONOCYTES RELATIVE PERCENT 7 %  4-12   EOSINOPHILS RELATIVE PERCENT 2 %  0-6   BASOPHILS RELATIVE PERCENT 0 %  0-1   NEUTROPHILS ABSOLUTE COUNT 5 23 Thousands/?L  1 85-7 62   LYMPHOCYTES ABSOLUTE COUNT 4 34 Thousands/?L  0 60-4 47   MONOCYTES ABSOLUTE COUNT 0 75 Thousand/?L  0 17-1 22   EOSINOPHILS ABSOLUTE COUNT 0 19 Thousand/?L  0 00-0 61   BASOPHILS ABSOLUTE COUNT 0 04 Thousands/?L  0 00-0 10   - Patient Instructions: This bloodwork is non-fasting  Please drink two glasses of water morning of bloodwork  - Patient Instructions: This bloodwork is non-fasting  Please drink two glasses of water morning of bloodwork  (1) COMPREHENSIVE METABOLIC PANEL 35EJU5354 54:35WK Xavier Kaylee    Order Number: [id number]     Test Name Result Flag Reference   GLUCOSE,RANDM 109 mg/dL     If the patient is fasting, the ADA then defines impaired fasting glucose as > 100 mg/dL and diabetes as > or equal to 123 mg/dL  SODIUM 134 mmol/L L 136-145   POTASSIUM 4 3 mmol/L  3 5-5 3   CHLORIDE 104 mmol/L  100-108   CARBON DIOXIDE 22 mmol/L  21-32   ANION GAP (CALC) 8 mmol/L  4-13   BLOOD UREA NITROGEN 9 mg/dL  5-25   CREATININE 1 01 mg/dL  0 60-1 30   Standardized to IDMS reference method   CALCIUM 8 9 mg/dL  8 3-10 1   BILI, TOTAL 0 59 mg/dL  0 20-1 00   ALK PHOSPHATAS 71 U/L     ALT (SGPT) 213 U/L H 12-78   AST(SGOT) 190 U/L H 5-45   ALBUMIN 3 7 g/dL  3 5-5 0   TOTAL PROTEIN 8 1 g/dL  6 4-8 2   eGFR Non-African American      >60 0 ml/min/1 73sq m   - Patient Instructions: This is a fasting blood test  Water,black tea or black  coffee only after 9:00pm the night before test Drink 2 glasses of water the morning of test   National Kidney Disease Education Program recommendations are as follows:  GFR calculation is accurate only with a steady state creatinine  Chronic Kidney disease less than 60 ml/min/1 73 sq  meters  Kidney failure less than 15 ml/min/1 73 sq  meters  (1) C-REACTIVE PROTEIN 20Oct2016 08:25AM [facility] Number: [id number]     Test Name Result Flag Reference   C-REACT PROTEIN 9 1 mg/L H <3 0   - Patient Instructions:  This is a fasting blood test  Water,black tea or black  coffee only after 9:00pm the night before test Drink 2 glasses of water the morning of test      (1) SED RATE 20Oct2016 08:25AM Xavier Kaylee    Order Number: [id number]     Test Name Result Flag Reference   SED RATE 13 mm/hour  0-20       Attending Note  Collaborating Physician: I did not interview and examine the patient, I discussed the case with the Advanced Practitioner and reviewed the note and I agree with the Advanced Practitioner note        Future Appointments    Date/Time Provider Specialty Site   01/25/2017 08:20 AM Humberto Mills DO Rheumatology [facility]     Signatures   Electronically signed by : ZIGGY Ordonez; Oct 26 2016  8:56AM EST                       (Author)    Electronically signed by : Fay Coronado DO; Oct 26 2016 10:47AM EST                       (Author)

## 2018-01-13 NOTE — PSYCH
Progress Note  Initial Evaluation and Individual Psychotherapy 75 min minutes provided today  Goals addressed in session:   EVALUATION/CONSULT FOR BARIATRIC SURGERY:    John Paul Worthington has been a client of this evaluator over the last 4-5 years, since the loss of her  in 2012  "Lena Godwin" has been treated for a Depressive Disorder and for Anxiety, which occurred during her 's marques with brain cancer and his eventual death  Lena Godwin has made appropriate use of therapy ,along with medication, to treat her depression and anxiety during the course of her treatment  Lena Godwin completed therapy after a consistent period of time with minimal symptoms, in 2014  Lena Godwin returned to therapy, as she was experiencing a great deal of medical problems, the origin of which remained unknown until when she was diagnosed with Rheumatoid Arthritis  Kvng Hinds resumed treatment prior to and after this diagnosis, as she was coming to terms with the changes this meant for her life  At the time that Lena Godwin asked for an evaluation for Bariatric Surgery, psychotherapy had been reduced to 1X/ every 2-3 months, for maintenance  On 1/18/2017, this therapist conducted an evaluation to determine the appropriateness of Bariatric Surgery, using the knowledge of [address] [place] [address] time in therapy, with an increased focus on the issue of her weight and psychological well being for this surgery  This evaluator used the criteria listed on the referral form as a guide in this process  Frannie's responses and this evaluators assessments are listed in categories below:    MENTAL STATUS:    Lena Godwin is a pleasant young woman whose Depressive Disorder and Anxiety are now in remission  Lena Godwin is intelligent and her thoughts are logical and goal directed  Lena Godwin has never suffered from paranoia or delusional thinking, nor has she ever had hallucinations   Lena Godwin has had suicidal ideations in the past as related to the tragic loss of her  from brain cancer, and the grief she was enduring  Rosita Wilburn has never acted on suicidal thoughts nor has she ever engaged in self harm behaviors  Rosita Wilburn has not had suicidal thoughts in several years at the time of this evaluation  Rosita Wilburn drinks alcohol on occasion and has never used illegal drugs of any kind  Rosita Wilburn has good judgement and she has gained a great deal of insight while in therapy  MOTIVATION FOR SURGERY:    Rosita Wilburn states that her main motivation for surgery is "Better Health"  Rosita Wilburn has become more worried about her health and wants to reduce her co-momorbidities, which are sleep apnea, high blood pressure, high cholesterol, PCOS, and Rheumatoid Arthritis  Frannie's biological father  last year unexpectedly, and her uncle just recently , which has highlighted her need to pay more attention to her health  HOW LONG HAS THE PATIENT BEEN OBESE:    Rosita Wilburn relays that she had a bigger frame when she was in high school and was over weight, but her weight did not become a significant problem until her  became sick 8 years ago and eventually   Over this period of time, her weight shifted a great deal, dropping and gaining weight but not having any sustained weight loss  HAS THE PATIENT EVER SUCCESSFULLY LOST WEIGHT/ WHAT IS HER DIET AND EXERCISE HISTORY:    Rosita Wilburn reports that she has tried Holley Rasheed, [employer], medication, and has worked with a dietician  Rosita Wilburn has also tired to exercise, especially swimming since her diagnosis with RA  While Rosita Wilburn would loose some weight for a while, she would end up gaining it back  STRESSORS IN PATIENT'S DAILY LIFE NAD COPING SKILLS:    Rosita Wilburn shared that when Love Huynh became sick, she started to binge eat as a way of coping with the stress  Rosita Wilburn still binges eat on occasion, but she has learned more effective ways of dealing with stress  Rosita Wilburn is able to talk about her feelings and to journal, to use deep breathing and other ways to reduce her anxiety       The main stress in [facility] [place] [facility] life at this time is related to traveling for her job and helping her mother at times  Rosita Wilburn has worked through her grief over the loss of her  and is now able to look forward to the future  Rosita Wilburn has 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 and she is feeling more in control of her life again  HISTORY OF ABUSE:     Rosita Wilburn had to grow up fast due to her father leaving the family when she was 5years old  Since Rosita Wilburn had no siblings, she felt a responsibility to take care of her mother emotionally  WHile this was a difficult childhood, there was no physical, mental or sexual abuse in or outside of the home  HISTORY OF EATING DISORDERS:    Rosita Wilburn states only that she used to binge eat as a way of coping when she felt she had no control over her life  SHe has never been anorexic or engaged in any bulimic behavior  HISTORY OF ADDICTIONS: N/A    FAMILY HISTORY OF PSYCHIATRIC ILLNESSES:      Rosita Wilburn does not know if her father had any psychiatric illnesses however he did have a head injury as a result of being struck by lightening, which may have contributed to irrational behavior at times  Rosita Wilburn relays that her mother suffers from depression and anxiety, but has never sought treatment  Rosita Wilburn states that she has a cousin who committed suicide  HISTORY OF DEPRESSION AND SUICIDAL IDEATION:    Rajwinder Adams depression is in remission and she no longer has suicidal ideations  UNDERSTANDING OF POTENTIAL RISKS AND COMPLICATIONS WITH SURGERY:    Rosita Wilburn was able to share very detailed information about the possible risks and complications of weight loss surgery  Rosita Wilburn shared information from her own research, in addition to attending an educational session on the subject, and from meeting wit her doctor  Rosita Wilburn relayed that there are 3 types of weight loss surgery, and that she and her doctor agree that the Gastric Sleeve option is the most appropriate for her    Rosita Wilburn is aware that she is at risk of malnutrition and dehydration if she does not follow the post surgical protocol  [place] Barber also knows that she needs to be vigilant about taking vitamins and enzymes on a regular basis  Scottymaureen Barber is also preparing to go on an all liquid diet, as required, prior to the surgery  Scotty Sunil knows that there is the risk of internal bleeding and/or death as a result of the surgery or afterwards  Scotty Barber has decided that the benefits of this surgery are worth all the potential risks  MEREDITH'S ABILITY TO ADHERE TO LIFE STYLE CHANGES AND POST- OP INSTRUCTIONS:    [place] Barber was able to inform this therapist of the changes that she needs to make and her determination to make them  Scotty Barber is making this decision after careful deliberation and she has the discipline and motivation to follow through on all that is expected of her  EXPECTATIONS OF THE SURGERY:    [place] is also aware that there are no guarantee that she will loose weight  Noemy Mcnulty main goal is to be heathier, "to be able to breathe easier and get off as many medications as possible"  Scottymaureen Barber is hopeful that by taking this step, it will lead to a reducition in her medical co-comorbidities  SUPPORT SYSTEM:    Scotty Barber is a very strong willed and independent person and when she sets her mind upon something, she is determined to follow it through to completion  WHile Scotty Barber has the ego strength and will to do all that is involved with this surgery on her own, thankfully, she has support from her mother and from friends and co-workers  Scotty Barber is a mentally competent young woman who is able to give informed consent to this surgery  Scotty Barber has researched all that is involved in a very thorough manner, and has deliberated about this decision for several years  Corinnas mental and emotioanl health has been stable for a sustained period of time, and I do not foresee this surgery having any negative consequences   I do see this surgery as having the potential to improve Meredith's self confidence, along with improving her health  If you have any questions about this evaluation or if I can be of further assistance, please do not hesitate to contact me at [phone]  Thank you,     YECENIA ViramontesW      Signatures   Electronically signed by : Liliana Viramontes; Feb 12 7361  7:58AM EST                       (Author)    Electronically signed by : Liliana Viramontes; Feb 12 7981 10:31PM EST                       (Author)    Electronically signed by : Liliana Viramontes; Feb 12 1294 11:10PM EST                       (Author)    Electronically signed by :  Liliana Viramontes; Feb 13 0197  9:53AM EST                       (Author)

## 2018-01-13 NOTE — MISCELLANEOUS
Message  Pt states generic wellbutrin not effective  Will change to brand        Plan  Major depressive disorder, recurrent, severe without psychotic features    · Wellbutrin  MG Oral Tablet Extended Release 24 Hour (BuPROPion HCl ER  (XL)); 1 po qam    Signatures   Electronically signed by : DEMETRIA Bee; Feb  3 2016  1:29PM EST                       (Author)

## 2018-01-14 VITALS
BODY MASS INDEX: 31.26 KG/M2 | HEIGHT: 66 IN | WEIGHT: 194.5 LBS | SYSTOLIC BLOOD PRESSURE: 128 MMHG | DIASTOLIC BLOOD PRESSURE: 72 MMHG

## 2018-01-14 VITALS
BODY MASS INDEX: 38.25 KG/M2 | HEIGHT: 66 IN | WEIGHT: 238 LBS | SYSTOLIC BLOOD PRESSURE: 126 MMHG | DIASTOLIC BLOOD PRESSURE: 96 MMHG

## 2018-01-14 NOTE — PSYCH
Progress Note  Individual Psychotherapy 60 min minutes provided today  Goals addressed in session:   [address] shares that she was sick last week and she expresses her frustration about a number of events of things that were difficult in the last several weeks  Therapist pointed out and ill acknowledged that she is feeling the tension build again in how she is handling stress  Leon Miranda is still taking breaks at work and using music to relax her, however, this is not always effective when an stressful event, unexpected, occurs, either at home or work  Therapist used CBT to help Leon Miranda see how her negative thinking affects her moods- Leon Miranda is self-aware and is motivated to work at this harder  Leon Miranda is also still interested in using EMDR for past traumas, although she is not currently having symptoms  Current suicide risk is low   Assessment    1  Bipolar 2 disorder (296 89) (F31 81)     INcrease in anxiety and difficulty handling stress     Plan  See weekly and review CBT skills and use EMDR as appropriate     Signatures   Electronically signed by :  Dalila Nava [place]; Jan 18 2637  9:02AM EST                       (Author)

## 2018-01-14 NOTE — PSYCH
Progress Note  Individual Psychotherapy 60 min minutes provided today  Goals addressed in session:   GOALS-Coping skills to manage RA  Lena Godwin updates therapist that Ameena Polanco and she have been spending more time together- there is less stress since they are not living together, and they are currently dating- Lena Godwin is ok with this but she is also enjoying her freedom of living on her own and only taking care of herself  Lena God[place] on her RA  Lena Godwin found it interesting and helpful- therapist realized just how much Corinnas RA is affecting her life  Current Pain Assessment: moderate to severe   On a scale of 0 to 10, the patient rates current pain at 6   Current suicide risk is low   Assessment   Mood is stable with some anxiety, but less since moving in her won  Plan  See in 1 week- complete [place] and help Lena Godwin identify specific changes she wants to work on  Signatures   Electronically signed by :  Liliana Gonzalez; Jun 11 1113  4:17PM EST                       (Author)

## 2018-01-14 NOTE — PSYCH
Progress Note  Individual Psychotherapy 60 min minutes provided today  Goals addressed in session:   GOALS-Processing recent loss/break up- preparing for independent life  Vera Reynoso shares how she and Cole Neighbors have been going through separation with belongings and bills, etc  Vera Reynoso is tearful as she admits she is feeling sad about this, even though she knows it is the right thing for her  Vera Reynoso identifies feeling "free" and looking forward to being independent  While Vera Reynoso has lived alone before, it was not by choice and she was still in the throes of acute grief  This time, she is choosing to be single and looking forward to it  Vera Reynoso is feeling as if she is turning a corner in her life and she is able to think about moving forward i her life  Current suicide risk is low   Assessment  Mood is a little depressed, corresponding with the break up and sadness over the loss  eVra Reynoso is also feeling this is the right decision and having more freedom  Plan   See  next week- support Vera Reynoso as she adjusts to single life again  Signatures   Electronically signed by :  Liliana Farrell; May  6 9697 10:10PM EST                       (Author)

## 2018-01-15 NOTE — PSYCH
Progress Note  Individual Psychotherapy 50 min minutes provided today  Goals addressed in session:   GOALS- Identifying negative thinking  Jacqueline Davis shares that she is successfully using her coping skills at work- listening to relaxing music while working and not allowing the external stress to get to her  Jacqueline Davis relayed a number of other stressors in her life that were causing stress and frustration- therapist pointed out to Jacqueline Davis a theme of becoming upset when things "are not as they should be", or how she thinks they should be  Discussed how this is connected to Jacqueline Davis anticipating that she has to be the ne to correct or address problems- something she has been doing her whole life  Therapist encouraged Jacqueline Davis to recognize times that she is fighting what is, vs what she thinks it should be  Jacqueline Davis saw Olivia TRINH for her psych meds- discussed the poss of a dx of bipolar- Jacqueline Davis relays that she does not think Jacqueline Davis is bipolar and changed some of her meds  Agreed to resume EMDR in next session  Current suicide risk is low   Assessment    1  Depression (311) (F32 9)   2  Anxiety (300 00) (F41 9)     Mood is anxious and easily irritated  Insight is limited  Jacqueline Davis is using coping skills at her job but not as well in other areas of her life  Plan   See weekly- resume Trauma [place] with EMDR     Signatures   Electronically signed by : Liliana Cortez; Jan 27 3236  9:05PM EST                       (Author)    Electronically signed by :  Liliana Cortez; Jan 30 9942 10:09AM EST                       (Author)

## 2018-01-15 NOTE — PSYCH
Psych Med Mgmt    Appearance: was calm and cooperative  Treatment Recommendations: D/C Seroquel  Trial Wellbutrin  mg po qam  Effexor  mgpo qd  Klonopin 1mg 1/2 - 1 po bid prn  Risks, Benefits And Possible Side Effects Of Medications: Risks, benefits, and possible side effects of medications explained to patient and patient verbalizes understanding  She reports normal appetite and recent lbs weight gain   Pt has had problems with getting time off work so difficulty coming to appointments  In the past few months her father passed away and she has been responsible for his end of life care even though she has not had much contact with him in several years  She has lost her house and living in apartment  She was also diagnosed with rheumatoid arthritis  She is still working full time in customer service  She was seeing her family nurse practitioner and started on seroquel to rule out bipolar  No evidence of hypomania or abel, no hypersexuality  Pt states some irritability at work at times but usually with reason, no outbursts  Not feeling better with seroquel and vivid dreams  Also c/o weigh gain- 7 lbs past month since on and glucose abnormal       Vitals  Signs [Data Includes: Current Encounter]   Recorded: [id number] 02:38PM   Height: 5 ft 6 in  Weight: 267 lb   BMI Calculated: 43 1  BSA Calculated: 2 26    DSM    Provisional Diagnosis: Major recurrent Depression without psychotic features  Assessment    1  Bipolar 2 disorder (296 89) (F31 81)   2  Major depressive disorder, recurrent, severe without psychotic features (296 33) (F33 2)    Plan    1  QUEtiapine Fumarate 50 MG Oral Tablet    2  From  ClonazePAM 1 MG Oral Tablet TAKE 1 TABLET EVERY 12 HOURS AS   NEEDED To ClonazePAM 1 MG Oral Tablet (KlonoPIN) TAKE 1/2 -1 TABLET EVERY 12   HOURS AS NEEDED    3   BuPROPion HCl ER (XL) 150 MG Oral Tablet Extended Release 24 Hour   (Wellbutrin XL); 1 tab po Qam    Review of Systems    Constitutional: No fever, no chills, feels well, no tiredness, no recent weight gain or loss  Cardiovascular: no complaints of slow or fast heart rate, no chest pain, no palpitations  Respiratory: no complaints of shortness of breath, no wheezing, no dyspnea on exertion  Gastrointestinal: no complaints of abdominal pain, no constipation, no nausea, no diarrhea, no vomiting  Genitourinary: no complaints of dysuria, no incontinence, no pelvic pain, no urinary frequency  Musculoskeletal: no complaints of arthralgia, no myalgias, no limb pain, no joint stiffness  Integumentary: no complaints of skin rash, no itching, no dry skin  Active Problems    1  Abdominal pain, RLQ (right lower quadrant) (789 03) (R10 31)   2  Abnormal LFTs (liver function tests) (790 6) (R79 89)   3  Abnormal weight gain (783 1) (R63 5)   4  Acute knee pain (719 46) (M25 569)   5  Anxiety (300 00) (F41 9)   6  Benign neoplasm of pituitary gland (227 3) (D35 2)   7  Bipolar 2 disorder (296 89) (F31 81)   8  Cat bite (879 8,E906 3) (W55 01XA)   9  Chondromalacia, patella (717 7) (M22 40)   10  Common migraine without aura (346 10) (G43 009)   11  Depression (311) (F32 9)   12  Exertional dyspnea (786 09) (R06 09)   13  Fatigue (780 79) (R53 83)   14  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)   15  High protein C activity level (790 92) (R79 1)   16  Hip pain (719 45) (M25 559)   17  Hyperlipidemia (272 4) (E78 5)   18  Hyperprolactinemia (253 1) (E22 1)   19  Hypothyroidism (244 9) (E03 9)   20  Left foot pain (729 5) (M79 672)   21  Long-term use of hydroxychloroquine (V58 69) (Z79 899)   22  Migraine (346 90) (G43 909)   23  Migraine headache (346 90) (G43 909)   24  Mood disorder (296 90) (F39)   25  CLARK (nonalcoholic steatohepatitis) (571 8) (K75 81)   26  NSAID long-term use (V58 64) (Z79 1)   27  Numbness (782 0) (R20 0)   28  Obesity (278 00) (E66 9)   29  On etanercept therapy (V58 69) (Z79 899)   30   Pain, foot (729 5) (M79 673)   31  Pituitary microadenoma (227 3) (D35 2)   32  Polycystic ovarian syndrome (256 4) (E28 2)   33  Post traumatic stress disorder (PTSD) (309 81) (F43 10)   34  Prediabetes (790 29) (R73 09)   35  Primary stabbing headache (339 85) (G44 85)   36  Rheumatoid arthritis of multiple sites with negative rheumatoid factor (714 0) (M06 09)   37  Sprain of ankle, left (845 00) (S93 402A)   38  Tendonitis (726 90) (M77 9)   39  Tension type headache (339 10) (G44 209)   40  Tingling in extremities (782 0) (R20 2)   41  TMJ Appearance   42  Trochanteric bursitis of right hip (726 5) (M70 61)   43  Vitamin D deficiency (268 9) (E55 9)    Past Medical History    1  History of Acute tonsillitis (463) (J03 90)   2  History of Depression with anxiety (300 4) (F41 8)   3  History of Diabetes Mellitus (250 00)   4  History of Elevated C-reactive protein (790 95) (R79 82)   5  History of common cold (V12 09) (Z86 19)   6  History of diabetes mellitus (V12 29) (Z86 39)   7  History of headache (V13 89) (Z87 898)   8  History of polyarthritis (V13 4) (Z87 39)   9  History of rheumatoid arthritis (V13 4) (Z87 39)   10  History of sinusitis (V12 69) (Z87 09)   11  History of Need for influenza vaccination (V04 81) (Z23)   12  History of Seronegative rheumatoid arthritis (714 0) (M06 00)   13  History of Vaginal candidiasis (112 1) (B37 3)    Allergies    1  Augmentin TABS    Current Meds   1  ClonazePAM 1 MG Oral Tablet; TAKE 1 TABLET EVERY 12 HOURS AS NEEDED; Last   Rx:[id number] Ordered   2  Dymista 137-50 MCG/ACT Nasal Suspension; INSTILL 2 SQUIRT Daily in each nostril; Therapy: 56VWW8575 to (Sheryl Francis)  Requested for: 70URD4087; Last   Rx:[id number] Ordered   3  Enbrel SureClick 50 MG/ML Subcutaneous Solution Auto-injector; USE ONCE WEEKLY; Therapy: (Allan Schultz) to Recorded   4   Glucophage  MG Oral Tablet Extended Release 24 Hour; 2,000mg daily in AM;   Therapy: (Recorded:08Osy8283) to Recorded   5  Hydroxychloroquine Sulfate 200 MG Oral Tablet; take 1 tablet twice daily after meals; Therapy: 08IHM4722 to (Evaluate:05Jan2016)  Requested for: 54IUJ6094; Last   Rx:[id number] Ordered   6  Nabumetone 750 MG Oral Tablet; TAKE 1 TABLET TWICE DAILY AFTER MEALS; Therapy: 80RMT9114 to (Shi Moll)  Requested for: 68VMS3353; Last   Rx:63Rrl2477 Ordered   7  QUEtiapine Fumarate 50 MG Oral Tablet; TAKE 1 TABLET AT BEDTIME; Therapy: 50OVM5012 to (Wyatt Age)  Requested for: 22XBZ4553; Last   Rx:84Cxa9301 Ordered   8  Venlafaxine HCl  MG Oral Capsule Extended Release 24 Hour; TAKE 1 CAPSULE   DAILY  Requested for: 13WBW1840; Last Rx:56Wqu4592 Ordered   9  Verapamil HCl - 40 MG Oral Tablet; Take 1 tablet po BID; Therapy: 78IVU5508 to (Evaluate:17Jan2016)  Requested for: 68FXV3017; Last   Rx:[id number] Ordered    The medication list was reviewed and updated today  Family Psych History    1  Family history of Denial Of Any Significant Medical History   2  Family history of arthritis (V17 7) (Z82 61)   3  Family history of psoriasis (V19 4) (Z84 0)    4  Family history of Denial Of Any Significant Medical History    5  Family history of Diabetes    6  Family history of Black lung disease    7  Family history of Cancer    8  Family history of CAD (coronary artery disease)   9  Family history of Colon cancer   10  Denied: Family history of Crohn's disease   6  Family history of diabetes mellitus (V18 0) (Z83 3)   12  Denied: Family history of rheumatoid arthritis   13  Denied: Family history of systemic lupus erythematosus   14  Denied: Family history of ulcerative colitis    The family history was reviewed and updated today  Social History    · Caffeine use (V49 89) (F15 90)   · Currently sexually active   · Full-time employment   · High school or GED   · Never A Smoker   · No drug use   · Occasional alcohol use   ·   The social history was reviewed and updated today  The social history was reviewed and is unchanged  End of Encounter Meds    1  Verapamil HCl - 40 MG Oral Tablet; Take 1 tablet po BID; Therapy: 55JRX2238 to (Evaluate:17Jan2016)  Requested for: 80NDQ4467; Last   Rx:19Oct2015 Ordered    2  ClonazePAM 1 MG Oral Tablet (KlonoPIN); TAKE 1/2 -1 TABLET EVERY 12 HOURS AS   NEEDED; Last Rx:22Jan2016 Ordered   3  Venlafaxine HCl  MG Oral Capsule Extended Release 24 Hour (Effexor XR); TAKE   1 CAPSULE DAILY  Requested for: 54DBW0805; Last Rx:05Nov2015 Ordered    4  Dymista 137-50 MCG/ACT Nasal Suspension; INSTILL 2 SQUIRT Daily in each nostril; Therapy: 21EDN9583 to (Nitin Gonzalez)  Requested for: 66PGL4916; Last   Rx:[id number] Ordered   5  Nabumetone 750 MG Oral Tablet; TAKE 1 TABLET TWICE DAILY AFTER MEALS; Therapy: 14ZXO2027 to (Nitin Gonzalez)  Requested for: 55LPY2887; Last   Rx:24Sep2015 Ordered    6  BuPROPion HCl ER (XL) 150 MG Oral Tablet Extended Release 24 Hour (Wellbutrin XL);   1 tab po Qam;   Therapy: 51CPV5234 to (Last Rx:22Jan2016)  Requested for: 22FIM7284 Ordered    7  Hydroxychloroquine Sulfate 200 MG Oral Tablet; take 1 tablet twice daily after meals; Therapy: 78AJJ7312 to (Evaluate:05Jan2016)  Requested for: 26LSZ1281; Last   Rx:46Qrk4888 Ordered    8  Glucophage  MG Oral Tablet Extended Release 24 Hour (MetFORMIN HCl ER);   2,000mg daily in AM;   Therapy: (Recorded:54Vem7002) to Recorded    9  Enbrel SureClick 50 MG/ML Subcutaneous Solution Auto-injector; USE ONCE WEEKLY;    Therapy: (Recorded:22Jan2016) to Recorded    Future Appointments    Date/Time Provider Specialty Site   02/02/2016 05:15 PM Brown Dsouza DO Family Medicine [address] [place] [address]   05/20/2016 03:00 PM Erlinda Castillo DO Rheumatology [facility]   02/09/2016 07:30 AM William Garner, [facility] Neurology [address]     Signatures   Electronically signed by : Ary Cockayne, [facility]; Jan 22 2016  2:52PM EST (Author)    Electronically signed by : Anat Choi MD; Jan 26 2016  4:29PM EST

## 2018-01-15 NOTE — PROGRESS NOTES
Assessment    1  Rheumatoid arthritis of multiple sites with negative rheumatoid factor (714 0) (M06 09)   2  NSAID long-term use (V58 64) (Z79 1)   3  On etanercept therapy (V58 69) (Z79 899)   4  Controlled type 2 diabetes mellitus with diabetic nephropathy, without long-term current   use of insulin (250 40,583 81) (E11 21)   5  Polycystic ovarian syndrome (256 4) (E28 2)   6  CLARK (nonalcoholic steatohepatitis) (571 8) (K75 81)   7  Obesity (278 00) (E66 9)    Plan    1  (1) CBC/PLT/DIFF; Status:Active; Requested for:30Bxv1933;    2  (1) COMPREHENSIVE METABOLIC PANEL; Status:Active; Requested for:91Ept5960;    3  (1) C-REACTIVE PROTEIN; Status:Active; Requested for:10Zgw2879;    4  (1) QUANTIFERON - TB GOLD; Status:Active; Requested for:05Txz3203;    5  (1) SED RATE; Status:Active; Requested for:64Dta1133;    6  Follow-up visit in 3 months Evaluation and Treatment  Follow-up  Status: Hold For -   Scheduling  Requested for: 19CME6624   7  Call [phone] if: The pain seems worse ; Status:Complete;   Done: 09KSH8832   8  Call [phone] if: The symptoms seem worse ; Status:Complete;   Done:   87PMR1383   9  Call [phone] if: You have questions or concerns about your problem ;   Status:Complete;   Done: 80QHI9045    10  Enbrel SureClick 50 MG/ML Subcutaneous Solution Auto-injector; Inject 50 mg    under the skin once weekly    Discussion/Summary    This is a 43-year-old female presenting today for follow-up for rheumatoid arthritis  Patient states that she has lost a total of 80 pounds since having gastric bypass performed  She states that overall she has been feeling much better  She does describe some pain in her hands primarily in the DIPs of the second digits of both hands  She denies any further joint pain or obvious joint swelling  She has no morning stiffness  Patient denies difficulty with sleep and has no non-restorative sleep  She denies fatigue   She states that she did stop hydroxychloroquine in May per the recommendation of her ophthalmologist as she did have significant change in her prescription  She continues to utilize Enbrel at this time  She also utilizes Zanaflex as needed for spasms  On physical examination, there is no active synovitis  Patient does have tenderness of the DIPs of the second digit of both hands  Patient has normal passive range of motion of both upper and lower extremities with crepitus of the knees  Patient's most recent labs reveal an elevated CRP of 5 3 however otherwise patient's CBC, CMP, and ESR within normal range  At this time patient's history and physical examination is most consistent with rheumatoid arthritis which appears to be mildly active at this time with the use of Enbrel  It was discussed with the patient that because her inflammation is still elevated we may consider further biologic medication in the future  Patient would not like to make any further changes at this time and treatment  Patient will return to the office in 6 months time per her request for further evaluation  She will contact the office in the future she has any further questions or concerns we will plan to obtain a CBC, CMP, CRP, ESR, and QuantiFERON TB Gold before next appointment  Counseling  Rheumatology Counseling Documentation: The patient and patient's family was counseled regarding diagnostic results, instructions for management, prognosis, patient and family education, risks and benefits of treatment options and importance of compliance with treatment  Chief Complaint  F/U RA   Patient is here today for follow up of chronic conditions described in HPI  History of Present Illness  Patient is in the office today for follow up for RA  Lost 80 lbs total  pain in the hands primarily in the DIPs of the 2nd digits  no other joint pain  no obvious joint swelling  No Am stiffness  no difficulty with sleep  No non restorative sleep  No fatigue   Stopped HCQ since May  Still taking Enbrel  taking Zanaflex as needed to spasms  RAPID3:4 8/30      Review of Systems    Constitutional: recent weight loss, but no fever, no recent weight gain, fatigue, no chills and no anorexia  HEENT: no blurred vision, no double vision, no amaurosis fugax, no dryness of the eyes, no eye pain, no erythema eye(s), no dryness mouth, no mouth sores, not feeling congested and no sore throat  Cardiovascular: no chest pain or pressure, no dyspnea on exertion and no swelling in the arms or legs  Respiratory: no unusual or persistent cough, no shortness of breath and no pleurisy  Gastrointestinal: no abdominal pain, no nausea, no vomiting, no heartburn, no diarrhea, no constipation, no melena and no BRBPR  Genitourinary: No foamy urine, but no dysuria and no hematuria  Musculoskeletal: as noted in HPI  Integumentary no rash, no Raynaud's, no alopecia, no nail changes and no photosensitivity  Endocrine no polyuria and no polydipsia  Hematologic/Lymphatic: a tendency for easy bruising, but no unusual bleeding  Neurological: no headache, no vertigo or dizziness, no tingling and no weakness  Psychiatric: no insomnia and no non-restorative sleep  Active Problems    1  Anxiety (300 00) (F41 9)   2  Aspiration into airway, initial encounter (934 9) (T17 908A)   3  Benign neoplasm of pituitary gland (227 3) (D35 2)   4  Chondromalacia, patella (717 7) (M22 40)   5  Controlled type 2 diabetes mellitus with diabetic nephropathy, without long-term current   use of insulin (250 40,583 81) (E11 21)   6  Depression (311) (F32 9)   7  Exertional dyspnea (786 09) (R06 09)   8  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)   9  High protein C activity level (790 92) (R79 1)   10  High risk medication use (V58 69) (Z79 899)   11  Hyperlipidemia (272 4) (E78 5)   12  Hyperprolactinemia (253 1) (E22 1)   13  Hypothyroidism (244 9) (E03 9)   14  Left foot pain (729 5) (M79 672)   15   Long-term use of hydroxychloroquine (V58 69) (Z79 899)   16  Loss of weight (783 21) (R63 4)   17  MDD (major depressive disorder), recurrent episode, severe (296 33) (F33 2)   18  Migraine (346 90) (G43 909)   19  Migraine, unspecified, not intractable, without status migrainosus (346 90) (G43 909)   20  Mood disorder (296 90) (F39)   21  CLARK (nonalcoholic steatohepatitis) (571 8) (K75 81)   22  Need for influenza vaccination (V04 81) (Z23)   23  NSAID long-term use (V58 64) (Z79 1)   24  Obesity (278 00) (E66 9)   25  On etanercept therapy (V58 69) (Z79 899)   26  Pain, foot (729 5) (M79 673)   27  Pain, joint, multiple sites (719 49) (M25 50)   28  Pap smear for cervical cancer screening (V76 2) (Z12 4)   29  Pituitary microadenoma (227 3) (D35 2)   30  Polycystic ovarian syndrome (256 4) (E28 2)   31  Post traumatic stress disorder (PTSD) (309 81) (F43 10)   32  Prediabetes (790 29) (R73 09)   33  Primary stabbing headache (339 85) (G44 85)   34  Rheumatoid arthritis of multiple sites with negative rheumatoid factor (714 0) (M06 09)   35  Screening for depression (V79 0) (Z13 89)   36  Screening for diabetic retinopathy (V80 2) (Z13 5)   37  Sleep apnea (780 57) (G47 30)   38  Vitamin D deficiency (268 9) (E55 9)   39  Wellness examination (V70 0) (Z00 00)    Past Medical History    1  History of Abdominal pain, RLQ (right lower quadrant) (789 03) (R10 31)   2  History of Abnormal LFTs (liver function tests) (790 6) (R79 89)   3  History of Abnormal weight gain (783 1) (R63 5)   4  History of Acute knee pain (719 46) (M25 569)   5  History of Acute tonsillitis (463) (J03 90)   6  History of Adalimumab (Humira) long-term use (V58 69) (Z79 899)   7  History of Cat bite (879 8,E906 3) (W55 01XA)   8  History of Common migraine without aura (346 10) (G43 009)   9  History of Depression with anxiety (300 4) (F41 8)   10  History of Diabetes Mellitus (250 00)   11  History of Elevated C-reactive protein (790 95) (R79 82)   12  History of Fatigue (780 79) (R53 83)   13  History of Hip pain (719 45) (M25 559)   14  History of common cold (V12 09) (Z86 19)   15  History of diabetes mellitus (V12 29) (Z86 39)   16  History of headache (V13 89) (Z87 898)   17  History of polyarthritis (V13 4) (Z87 39)   18  History of rheumatoid arthritis (V13 4) (Z87 39)   19  History of sinusitis (V12 69) (Z87 09)   20  History of Need for influenza vaccination (V04 81) (Z23)   21  History of Numbness (782 0) (R20 0)   22  History of Seronegative rheumatoid arthritis (714 0) (M06 00)   23  History of Sprain of ankle, left (845 00) (S93 402A)   24  History of Tendonitis (726 90) (M77 9)   25  History of Tension type headache (339 10) (G44 209)   26  History of Tingling in extremities (782 0) (R20 2)   27  History of TMJ Appearance   28  History of Trochanteric bursitis of right hip (726 5) (M70 61)   29  History of Vaginal candidiasis (112 1) (B37 3)   30  History of Viral URI with cough (465 9) (J06 9,B97 89)    The active problems and past medical history were reviewed and updated today  Surgical History    1  History of Dental Surgery   2  History of Gastrectomy Sleeve   3  History of Nasal Septal Deviation Repair   4  History of Oral Surgery Tooth Extraction   5  History of Tonsillectomy    The surgical history was reviewed and updated today  Family History  Mother    1  Family history of Denial Of Any Significant Medical History   2  Family history of arthritis (V17 7) (Z82 61)   3  Family history of psoriasis (V19 4) (Z84 0)  Father    4  Family history of Denial Of Any Significant Medical History  Paternal Grandmother    5  Family history of Diabetes  Maternal Grandfather    6  Family history of Black lung disease  Paternal Grandfather    9  Family history of Cancer  Family History    8  Family history of CAD (coronary artery disease)   9  Family history of Colon cancer   10  Denied: Family history of Crohn's disease   6   Family history of diabetes mellitus (V18 0) (Z83 3)   12  Denied: Family history of rheumatoid arthritis   13  Denied: Family history of systemic lupus erythematosus   14  Denied: Family history of ulcerative colitis    The family history was reviewed and updated today  Social History    · Caffeine use (V49 89) (F15 90)   · Currently sexually active   · Full-time employment   · High school or GED   · Never a smoker   · No drug use   · Occasional alcohol use   · Patient has living will (V49 89) (Z78 9)   ·   The social history was reviewed and updated today  The social history was reviewed and is unchanged  Current Meds   1  ClonazePAM 1 MG Oral Tablet; TAKE 1 TABLET Bedtime; Last Rx:49Kwf1195 Ordered   2  Enbrel SureClick 50 MG/ML Subcutaneous Solution Auto-injector; Inject 50 mg under the   skin once weekly; Therapy: 15Dae6668 to (Evaluate:19Jun2018)  Requested for: 09EKY1527; Last   Rx:27Xys2137 Ordered   3  QUEtiapine Fumarate 50 MG Oral Tablet; TAKE 1 TABLET AT W/ Supper; Therapy: 31SDJ3647 to (Evaluate:04Zvl7521)  Requested for: 00Hdz6255; Last   Rx:94Vic4590 Ordered   4  Rosuvastatin Calcium 10 MG Oral Tablet; TAKE 1 TABLET DAILY; Therapy: 64UCO6070 to (Evaluate:11Jdn5278)  Requested for: 50HCD1943; Last   IW:77SJS9697 Ordered   5  SUMAtriptan Succinate 6 MG/0 5ML Subcutaneous Solution Auto-injector; inject 0 5ml at   onset of headache and may repeat in 2 hours if needed,   max 2inj/day max 3days/week; Therapy: 52AFE6109 to (Evaluate:12Mar2017)  Requested for: 28TBS7124; Last   Rx:11Jan2017 Ordered   6  SUMAtriptan Succinate 6 MG/0 5ML Subcutaneous Solution; inject 0 5ml at onset of   headache and may repeat in 2 hours if needed, max 2inj/day max 3days/week; Therapy: 44Yne6599 to (Last Rx:88Ctv1551)  Requested for: 49Jpq0976 Ordered   7  TiZANidine HCl - 4 MG Oral Tablet; TAKE 1/2 TO 1 TABLET 3 TIMES DAILY AS NEEDED;    Therapy: 44VYP6987 to (Evaluate:10Aug2017)  Requested for: 45CFU2375; Last Rx:80Aet8616 Ordered   8  Verapamil HCl - 40 MG Oral Tablet; One Tablet twice daily; Therapy: 01Svl8336 to (Evaluate:33Yet8574)  Requested for: 21Jun2017; Last   SJ:57RMI5844 Ordered    The medication list was reviewed and updated today  Immunizations  Influenza --- Alma He: Temporarily Deferred: Pt refuses, As of: 27MDS5536, Defer for 1 Years; Agustin Dennison: 30-Qdl-7793Ugcimqje Phalen: 29-Qtn-1069Abzjais Real: 12-Oct-2016; Series5: 85394866   PCV --- Series1: 02-Feb-2016   Td/DT --- Series1: 01-Jun-2006     Allergies    1  Augmentin TABS   2  NSAIDs    Vitals  Signs   Recorded: 56Jem5388 08:59AM   Heart Rate: 88  Systolic: 805  Diastolic: 80  Height: 5 ft 6 in  Weight: 195 lb   BMI Calculated: 31 47  BSA Calculated: 1 98    Physical Exam    Constitutional   General appearance: Abnormal   obese  Eyes   Conjunctiva and lids: No swelling, erythema or discharge  Pupils and irises: Equal, round and reactive to light  Ears, Nose, Mouth, and Throat   External inspection of ears and nose: Normal     Oropharynx: Normal with no erythema, edema, exudate lesions, or ulcers  Pulmonary   Respiratory effort: No increased work of breathing or signs of respiratory distress  Auscultation of lungs: Clear to auscultation  Cardiovascular   Auscultation of heart: Normal rate and rhythm, normal S1 and S2, without murmurs  Examination of extremities for edema and/or varicosities: Normal     Lymphatic   Palpation of lymph nodes in neck: No lymphadenopathy  Psychiatric   Orientation to person, place, and time: Normal     Mood and affect: Normal       Right 2nd Finger DIP tenderness  Left 2nd finger DIP tenderness  Right Upper Extremity: Normal ROM  Left Upper Extremity: Normal ROM  Right Lower Extremity: Normal ROM  Left Lower Extremity: Normal ROM  Musculoskeletal - Joints, bones, and muscles: Abnormal  Palpation - bilateral knee crepitus       Right foot: All MTP, PIP and DIP joints are normal  Left foot: All MTP, PIP and DIP joints are normal       Results/Data  (1) CBC/PLT/DIFF 31Coc1368 10:15AM Anita Chang    Order Number: [id number]     Test Name Result Flag Reference   WBC COUNT 9 94 Thousand/uL  4 31-10 16   RBC COUNT 4 64 Million/uL  3 81-5 12   HEMOGLOBIN 14 1 g/dL  11 5-15 4   HEMATOCRIT 41 1 %  34 8-46  1   MCV 89 fL  82-98   MCH 30 4 pg  26 8-34 3   MCHC 34 3 g/dL  31 4-37 4   RDW 13 3 %  11 6-15 1   MPV 10 9 fL  8 9-12 7   PLATELET COUNT 783 Thousands/uL  149-390   NEUTROPHILS RELATIVE PERCENT 55 %  43-75   LYMPHOCYTES RELATIVE PERCENT 36 %  14-44   MONOCYTES RELATIVE PERCENT 7 %  4-12   EOSINOPHILS RELATIVE PERCENT 1 %  0-6   BASOPHILS RELATIVE PERCENT 1 %  0-1   NEUTROPHILS ABSOLUTE COUNT 5 52 Thousands/? ??L  1 85-7 62   LYMPHOCYTES ABSOLUTE COUNT 3 55 Thousands/? ??L  0 60-4 47   MONOCYTES ABSOLUTE COUNT 0 70 Thousand/? ??L  0 17-1 22   EOSINOPHILS ABSOLUTE COUNT 0 12 Thousand/? ??L  0 00-0 61   BASOPHILS ABSOLUTE COUNT 0 05 Thousands/? ??L  0 00-0 10     (1) COMPREHENSIVE METABOLIC PANEL 92AAN9195 12:80VP Anita Chang    Order Number: [id number]     Test Name Result Flag Reference   SODIUM 139 mmol/L  136-145   POTASSIUM 3 9 mmol/L  3 5-5 3   CHLORIDE 104 mmol/L  100-108   CARBON DIOXIDE 24 mmol/L  21-32   ANION GAP (CALC) 11 mmol/L  4-13   BLOOD UREA NITROGEN 13 mg/dL  5-25   CREATININE 0 81 mg/dL  0 60-1 30   Standardized to IDMS reference method   CALCIUM 9 0 mg/dL  8 3-10 1   BILI, TOTAL 0 50 mg/dL  0 20-1 00   ALK PHOSPHATAS 52 U/L     ALT (SGPT) 27 U/L  12-78   Specimen collection should occur prior to Sulfasalazine administration due to the potential for falsely depressed results  AST(SGOT) 14 U/L  5-45   Specimen collection should occur prior to Sulfasalazine administration due to the potential for falsely depressed results     ALBUMIN 3 7 g/dL  3 5-5 0   TOTAL PROTEIN 7 7 g/dL  6 4-8 2   eGFR 96 ml/min/1 73sq juli [employer] Disease Education Program recommendations are as follows:  GFR calculation is accurate only with a steady state creatinine  Chronic Kidney disease less than 60 ml/min/1 73 sq  meters  Kidney failure less than 15 ml/min/1 73 sq  meters  GLUCOSE FASTING 89 mg/dL  65-99   Specimen collection should occur prior to Sulfasalazine administration due to the potential for falsely depressed results  Specimen collection should occur prior to Sulfapyridine administration due to the potential for falsely elevated results  (1) C-REACTIVE PROTEIN 09Sgm8998 10:15AM [employer]   TW Order Number: [id number]     Test Name Result Flag Reference   C-REACT PROTEIN 5 3 mg/L H <3 0     (1) SED RATE 08Zoi8400 10:15AM [employer]   TW Order Number: [id number]     Test Name Result Flag Reference   SED RATE 10 mm/hour  0-20       Attending Note  Collaborating Physician: I did not interview and examine the patient, I discussed the case with the Advanced Practitioner and reviewed the note and I agree with the Advanced Practitioner note  Future Appointments    Date/Time Provider Specialty Site   02/23/2018 09:20 AM [facility]     Signatures   Electronically signed by : [facility];  Aug 18 2017  9:33AM EST                       (Author)    Electronically signed by : Joleen Thayer DO; Aug 18 2017 10:50AM EST                       (Author)

## 2018-01-15 NOTE — PSYCH
Progress Note  Individual Psychotherapy 60 min minutes provided today  Goals addressed in session:   Mervin Silva expresses her frustrations ongoing anger with Azalea Mullen and issue with is family  Therapist led Rylie Kraus in an IFS meditation, which she found helpful  Rylie Kraus was able to identify and talk more about the part of her which is angry and this made more sense to her  Rylie Kraus felt some relief after this  Rylie Kraus has been sleeping better since using a Sleep Apnea device- she has also a little less pain with her RA  Rylie Kraus has been having significant pain with her RA- this is also contributing to her stress  Current Pain Assessment: moderate to severe   On a scale of 0 to 10, the patient rates current pain at 6   Current suicide risk is low   Assessment  Mood is stable and improved some, perhaps she Rylie Kraus is sleeping better  Rylie Kraus gained more insight into her anger in this session, she is also regularly using meditation on her won to handle stress  Plan  See in 2 weeks after wedding- therapist will continue to use IFS meditations in session, as she finds this very helpful  Signatures   Electronically signed by : John Bernstein [place];  Apr 9 2016  1:09PM EST                       (Author)

## 2018-01-16 NOTE — PSYCH
Progress Note  Individual Psychotherapy 45 min minutes provided today  Goals addressed in session:   GOALS- Processing recent loss  Gina Chavarria comes to session already tearful  Gina Chavarria relays the events over the last wknd in which she and Jose Luis Purcell were dividing up their belongings  Gina Chavarria relays that she was trying to ask Jose Luis Purcell for a discussion about what went wrong in their relationship  Jose Luis Purcell ignored her requests and acted as if the break up is not bothering him  THis angered Gina Chavarria to the point of throwing and breaking things in the apartment and telling Jose Luis Purcell to get out  Gina Chavarria processes her feelings of hurt and rage over Sy's refusal to talk to her, even though she knows and believes that this break up is for the best fo her  Assessment   Mood is depressed and angry, but she is coping  Plan  Continue to see weekly to help process this loss and continue to stabilize mental health  Signatures   Electronically signed by :  Liliana Noe; May 11 3797  9:22PM EST                       (Author)

## 2018-01-16 NOTE — PSYCH
Progress Note  Individual Psychotherapy 60 min minutes provided today  Goals addressed in session:   GOALS- Continuing with self care and education  Elijah Albert appeared reports feeling generally well- not depressed and more hopeful about the future  Elijah Albert finished her 1st class with a 4 0 avenge and is now taking her 2nd  Frannie's new med combo has been working well for her RA, and she has been walking more  Elijah Albert shares mixed feelings about their friendship with Karin Koenig, as their status is still unclear  Elijah Albert wants to be sure that she doesn't ot re-enter a relationship with Karin Koenig, just bc she does not want ot be alone  On a scale of 0 to 10, the patient rates current pain at 3   Current suicide risk is low   Assessment  Mood is stable with mild anxiety  Plan  Agreed that sessions can reduce to every 6 weeks for maintenance  Signatures   Electronically signed by :  Liliana Marin; Oct 22 9233 12:50PM EST                       (Author)

## 2018-01-16 NOTE — PSYCH
Progress Note  Individual Psychotherapy 60 min minutes provided today  Goals addressed in session:   GOALS- LOOSING WEIGHT AND PURSUING Arash Flatter comes to session in a positive and euthymic mood  Costella Meckel has almost completed her 2nd class towards her certificate program  Costella Meckel is still looking for a new job, as the stress at her present job has been increasing  Costella Meckel is coping with this by actively applying for a new job  Costella Meckel is remaining just friends with Katty Hussein and this feels right to her, and she is not necessarily looking for a new relationship for now- she is trying to focus on herself  Costella Meckel is serously considering bariatric surgery to loose wght and improve her hearth- she has appts set up for her evaluation  On a scale of 0 to 10, the patient rates current pain at 3   Current suicide risk is low   Assessment  Mood is stable and not depressed  Costella Meckel shares that while the holidays are usually difficult for her, she is feeling much better than she has in previous years  Plan  Costella Meckel identified that she would like to check in, in 3 month, for maintenance  SHe will contact therapist sooner if needed in interim  Signatures   Electronically signed by :  Liliana Castrejon; Dec  5 8706  9:24PM EST                       (Author)

## 2018-01-16 NOTE — PSYCH
Progress Note  Individual Psychotherapy 60 min minutes provided today  Goals addressed in session:   [address]  Angélica Miranda is pleased to share w therapist that she has lost a total of 50 lbs, aand she is half way to her goal weight  Angélica Miranda shares how the surgery went and her recovery  Angélica Miranda is feeling so much better already and her blood work is indicating a significant drop in inflammation- her RA symptoms are hardly present at this time  Angélica Miranda is feeling encouraged and empowered and feeling as if she has control over her life again  IN addition to these positive events- Angélica Miranda has been daring a new man named, Norm, and it is going very well  Angélica Miranda shares that she feels incredibly comfortable with him nd they have both expressed their love for each others  Angélica Miranda is excited and feels as if this could be potential marriage partner  Angélica Miranda processes how she used to think that Erlinda Perales was the only one she could feel this way about- but has found this is not true  Angélica Miranda is having a dilemma about telling Preeti Marrero, as they have stayed in touch and Preeti Marrero has been asking to date gain  Discussed diff ways that Angélica Miranda can break this news and helped Angélica Miranda separate out that she is not responsible for the effect on Sy's mental health  Current suicide risk is low   Assessment  Mood is stable and euthymic- Angélica Miranda has taken back her life in many ways this year and is feeling very positive about the future  Plan  Decided to meet again in about 2-3 months- to check in on mental Health and for maintenance  IF things remain going as well as s they are, we will discuss completing therapy  Signatures   Electronically signed by :  Liliana Taylor; May 14 1334  9:59PM EST                       (Author)

## 2018-01-16 NOTE — PSYCH
Progress Note  Individual Psychotherapy 60 min minutes provided today  Goals addressed in session:   GOALS- Healthy boundaries/ self care  Frannie's mood is euthymic and stable  Julisa Nino shares that her business trip went well  SHe and Josephine Sharma have been spending time together and went on vacation, they were talking more than when they were together  Julisa Nino is a little confused bc then Josephine Sharma told her he can only offer her friendship  Julisa Nino was a little hurt as Josephine Sharma said he wanted to date her, however, she is nto crushed- but she is going to develop more space and a healthier boundary if they are only gong ot be friends  Developed anew tx plan- Julisa Nino does not feel she is dealing with any PTSD symptoms at this time, and she also feels she has "turned a corner"  SHe expresses that she is able to see how she can help others with what she has gone through, which is a significant steps forward for her  Current suicide risk is low   Diagnosis and Treatment Plan explained to patient, patient relates understanding diagnosis and is agreeable to Treatment Plan  Assessment  Mood is stable and Julisa Nino appears and reports feeling better than she has in a long time  SHe is not on any psychmeds at this time  Plan  Agreed that sessions can reduce to every 3-4 weeks for maintenance at tis time  Signatures   Electronically signed by :  Liliana Xiao; Jul 22 1979  6:08PM EST                       (Author)

## 2018-01-16 NOTE — PROGRESS NOTES
Assessment    1  Rheumatoid arthritis of multiple sites with negative rheumatoid factor (714 0) (M06 09)   2  Vitamin D deficiency (268 9) (E55 9)   3  On etanercept therapy (V58 69) (Z79 899)   4  NSAID long-term use (V58 64) (Z79 1)   5  Long-term use of hydroxychloroquine (V58 69) (Z79 899)   6  Hypothyroidism (244 9) (E03 9)   7  CLARK (nonalcoholic steatohepatitis) (571 8) (K75 81)   8  Obesity (278 00) (E66 9)    Plan    1  Humira Pen 40 MG/0 8ML Subcutaneous Pen-injector Kit; Inject 1 pen SC Q other   week as directed   2  Follow-up visit in 2 months Evaluation and Treatment  Follow-up  Status: Complete    Done: 70QSD6808    3  Hydroxychloroquine Sulfate 200 MG Oral Tablet; take 1 tablet twice daily after   meals    4  Enbrel SureClick 50 MG/ML Subcutaneous Solution Auto-injector   5  Call [phone] if: The pain seems worse ; Status:Complete;   Done: 62DGJ6752   6  Call [phone] if: The symptoms seem worse ; Status:Complete;   Done:   12RSD4950    Discussion/Summary    Ms  Leader has been feeling generally well since her last evaluation, and she reports that she is only had one flare of her rheumatoid arthritis since her last visit  At her last visit, she was having significant right foot pain, and she did ultimately have an x-ray to rule out an underlying fracture, which was negative  She did see a podiatrist and had an injection, and then shortly afterwards developed a flare involving her right knee  She was seen in the emergency department and given a short course of tramadol  At this time, she states that her right knee pain has resolved, but she continues to have significant bilateral foot pain  She does report some nodules which developed in her heels typically when they are more swollen and painful  She does report difficulty walking for prolonged periods of time, as her feet feel like they are on fire  She also complains of significant dry eyes and dry mouth   She does report swelling in her feet when she sits for prolonged periods of time  She denies any other obvious joint swelling  She does report morning stiffness typically lasting 45-60 minutes before improvement  She also reports gelling when sitting for prolonged periods of time  She denies any difficulty sleeping because of the pain  She denies any nonrestorative sleep, and reports this has improved with the treatment for her sleep apnea with CPAP  She also denies any fatigue  On exam, there is mild synovitis of the bilateral ankles, right more so than left  There is no other active synovitis  She does have mild crepitus of the bilateral knees  Review of laboratory studies revealed a normal CBC and ESR  CMP did show a transaminitis which was relatively unchanged  CRP was elevated slightly increased from prior levels  TSH was within normal limits, and vitamin D was decreased  At this time, her rheumatoid arthritis does appear mildly active despite Plaquenil and Enbrel  We did discuss several treatment options, and we have opted to discontinue the Enbrel and instead try Humira 40 mg subcutaneously every other week  She will continue on the Plaquenil at its current dose  We will obtain a prior authorization for the Humira  I will reevaluate her in 2 months  She will call in the interim if there are any questions or concerns  Counseling  Rheumatology Counseling Documentation: The patient and patient's family was counseled regarding diagnostic results, instructions for management, impressions and risks and benefits of treatment options  The following educational handouts were provided: Humira  Chief Complaint  F/U RA   Patient is here today for follow up of chronic conditions described in HPI  History of Present Illness  Pt  returns for for F/U for RA  Only had one flare of RA since last visit  Had X-ray of R foot since last visit, with no clear reason for pain  Had injection, then developed flare in R knee   Still with significant foot pain, but otherwise feeling well  c/o nodules in heels which appear and disappear  + difficulty walking for prolonged periods of time -> feet feel like they are on fire  c/o significant dry eyes and mouth  + swelling in feet when sitting for periods of time  No other obvious joint swelling  + AM stiffness x 45-60 minutes  + gelling when sitting for periods of time  No difficulty sleeping due to pain  Currently using CPAP for SONIA with improvement in sleep  No non-restorative sleep  No fatigue  RAPID3: 13 3/30      Review of Systems    Constitutional: anorexia, but no fever, no recent weight gain, fatigue, no chills and no recent weight loss  HEENT: dryness of the eyes and dryness mouth, but no blurred vision, no double vision, no amaurosis fugax, no eye pain, no erythema eye(s), no mouth sores, not feeling congested and no sore throat  Cardiovascular: no chest pain or pressure, no dyspnea on exertion and no swelling in the arms or legs  Respiratory: no unusual or persistent cough, no shortness of breath and no pleurisy  Gastrointestinal: no abdominal pain, no nausea, no vomiting, no heartburn, no diarrhea, no constipation, no melena and no BRBPR  Genitourinary: no foamy urine, but no dysuria and no hematuria  Musculoskeletal: as noted in HPI  Integumentary rash, nodules and photosensitivity, but no Raynaud's, no alopecia and no nail changes  Endocrine no polyuria and no polydipsia  Hematologic/Lymphatic: no unusual bleeding and no tendency for easy bruising  Neurological: tingling, but no headache, no vertigo or dizziness and no weakness  Psychiatric: no insomnia and no non-restorative sleep  Active Problems    1  Anxiety (300 00) (F41 9)   2  Benign neoplasm of pituitary gland (227 3) (D35 2)   3  Chondromalacia, patella (717 7) (M22 40)   4  Depression (311) (F32 9)   5  Exertional dyspnea (786 09) (R06 09)   6  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)   7   High protein C activity level (790 92) (R79 1)   8  Hyperlipidemia (272 4) (E78 5)   9  Hyperprolactinemia (253 1) (E22 1)   10  Hypothyroidism (244 9) (E03 9)   11  Left foot pain (729 5) (M79 672)   12  Long-term use of hydroxychloroquine (V58 69) (Z79 899)   13  MDD (major depressive disorder), recurrent episode, severe (296 33) (F33 2)   14  Migraine (346 90) (G43 909)   15  Migraine headache (346 90) (G43 909)   16  Mood disorder (296 90) (F39)   17  CLARK (nonalcoholic steatohepatitis) (571 8) (K75 81)   18  NSAID long-term use (V58 64) (Z79 1)   19  Obesity (278 00) (E66 9)   20  On etanercept therapy (V58 69) (Z79 899)   21  Pain, foot (729 5) (M79 673)   22  Pain, joint, multiple sites (719 49) (M25 50)   23  Pituitary microadenoma (227 3) (D35 2)   24  Polycystic ovarian syndrome (256 4) (E28 2)   25  Post traumatic stress disorder (PTSD) (309 81) (F43 10)   26  Prediabetes (790 29) (R73 09)   27  Rheumatoid arthritis of multiple sites with negative rheumatoid factor (714 0) (M06 09)   28  Vitamin D deficiency (268 9) (E55 9)    Past Medical History    1  History of Abdominal pain, RLQ (right lower quadrant) (789 03) (R10 31)   2  History of Abnormal LFTs (liver function tests) (790 6) (R79 89)   3  History of Abnormal weight gain (783 1) (R63 5)   4  History of Acute knee pain (719 46) (M25 569)   5  History of Acute tonsillitis (463) (J03 90)   6  History of Cat bite (879 8,E906 3) (W55 01XA)   7  History of Common migraine without aura (346 10) (G43 009)   8  History of Depression with anxiety (300 4) (F41 8)   9  History of Diabetes Mellitus (250 00)   10  History of Elevated C-reactive protein (790 95) (R79 82)   11  History of Fatigue (780 79) (R53 83)   12  History of Hip pain (719 45) (M25 559)   13  History of common cold (V12 09) (Z86 19)   14  History of diabetes mellitus (V12 29) (Z86 39)   15  History of headache (V13 89) (Z87 898)   16  History of polyarthritis (V13 4) (Z87 39)   17   History of rheumatoid arthritis (V13 4) (Z87 39)   18  History of sinusitis (V12 69) (Z87 09)   19  History of Need for influenza vaccination (V04 81) (Z23)   20  History of Numbness (782 0) (R20 0)   21  History of Primary stabbing headache (339 85) (G44 85)   22  History of Seronegative rheumatoid arthritis (714 0) (M06 00)   23  History of Sprain of ankle, left (845 00) (S93 402A)   24  History of Tendonitis (726 90) (M77 9)   25  History of Tension type headache (339 10) (G44 209)   26  History of Tingling in extremities (782 0) (R20 2)   27  History of TMJ Appearance   28  History of Trochanteric bursitis of right hip (726 5) (M70 61)   29  History of Vaginal candidiasis (112 1) (B37 3)   30  History of Viral URI with cough (465 9) (J06 9,B97 89)    The active problems and past medical history were reviewed and updated today  Surgical History    1  History of Dental Surgery   2  History of Nasal Septal Deviation Repair   3  History of Oral Surgery Tooth Extraction   4  History of Tonsillectomy    The surgical history was reviewed and updated today  Family History  Mother    1  Family history of Denial Of Any Significant Medical History   2  Family history of arthritis (V17 7) (Z82 61)   3  Family history of psoriasis (V19 4) (Z84 0)  Father    4  Family history of Denial Of Any Significant Medical History  Paternal Grandmother    5  Family history of Diabetes  Maternal Grandfather    6  Family history of Black lung disease  Paternal Grandfather    9  Family history of Cancer  Family History    8  Family history of CAD (coronary artery disease)   9  Family history of Colon cancer   10  Denied: Family history of Crohn's disease   6  Family history of diabetes mellitus (V18 0) (Z83 3)   12  Denied: Family history of rheumatoid arthritis   13  Denied: Family history of systemic lupus erythematosus   14  Denied: Family history of ulcerative colitis    The family history was reviewed and updated today         Social History · Caffeine use (V49 89) (F15 90)   · Currently sexually active   · Full-time employment   · High school or GED   · Never a smoker   · No drug use   · Occasional alcohol use   ·   The social history was reviewed and updated today  The social history was reviewed and is unchanged  Current Meds   1  BuPROPion HCl ER (XL) 150 MG Oral Tablet Extended Release 24 Hour; 1 tab po Qam;   Therapy: 76DEL2880 to (Last Rx:25Mar2016)  Requested for: 22ZTX9462 Ordered   2  ClonazePAM 1 MG Oral Tablet; TAKE 1/2 -1 TABLET EVERY 12 HOURS AS NEEDED; Last   Rx:25Mar2016 Ordered   3  Dymista 137-50 MCG/ACT Nasal Suspension; INSTILL 2 SQUIRT Daily in each nostril; Therapy: 84UAW4034 to (Fayrene Graven)  Requested for: 45AUE2656; Last   Rx:24Sep2015 Ordered   4  Enbrel SureClick 50 MG/ML Subcutaneous Solution Auto-injector; INJECT 50 MG UNDER   THE SKIN ONCE WEEKLY AS DIRECTED; Last Rx:61Llu1216 Ordered   5  Ergocalciferol 59767 UNIT Oral Capsule; TAKE 1 CAPSULE ONCE WEEKLY; Therapy: (Recorded:73Afs1632) to Recorded   6  Glucophage  MG Oral Tablet Extended Release 24 Hour; 2,0mg total daily in AM   (4 tabs) Recorded   7  Hydroxychloroquine Sulfate 200 MG Oral Tablet; take 1 tablet twice daily after meals; Therapy: 03VEN9813 to (Evaluate:05Jan2016)  Requested for: 24PNP3005; Last   Rx:57Uip5175 Ordered   8  Nabumetone 750 MG Oral Tablet; TAKE 1 TABLET TWICE DAILY AFTER MEALS; Therapy: 19ICC6462 to (Fayrene Graven)  Requested for: 12NGU6802; Last   Rx:79Hoo3441 Ordered   9  Ondansetron HCl - 4 MG Oral Tablet; one tablet every 8 hours as needed for nausea; Therapy: 10QVZ3501 to (Last Rx:30Mar2016)  Requested for: 81YQH9345 Ordered   10  Promethazine-Codeine 6 25-10 MG/5ML Oral Syrup; TAKE 5 ML EVERY 4 HOURS AS    NEEDED; Therapy: 95Ram3103 to (Evaluate:16Apr2016); Last Rx:12Apr2016 Ordered   11  QUEtiapine Fumarate 50 MG Oral Tablet; 1 po qhs prn;     Therapy: 71UVR3620 to (Last Rx:25Mar2016)  Requested for: 27FRF5951 Ordered   12  TraMADol HCl - 50 MG Oral Tablet; Take as directed by ordering provider; Therapy: (Recorded:25Mar2016) to Recorded   13  Venlafaxine HCl  MG Oral Capsule Extended Release 24 Hour; TAKE 1 CAPSULE    DAILY  Requested for: 19YPG2510; Last Rx:25Mar2016 Ordered   14  Verapamil HCl - 40 MG Oral Tablet; Take 1 tablet po BID; Therapy: 83FIA0504 to (Evaluate:17Jan2016)  Requested for: 77FMV0429; Last    Rx:19Oct2015 Ordered   15  Wellbutrin  MG Oral Tablet Extended Release 24 Hour; 1 po qam;    Therapy: 54LRJ1757 to (Last Rx:48Kha6476)  Requested for: 24Oij5758 Ordered    The medication list was reviewed and updated today  Immunizations  Influenza --- Demetrio Lower: 20SMZ2680Oilyinn Mary: 67MEE4121Wkbsdw Rosario: 77992163   Pneumococcal --- Series1: 64ZUR2977   Td/DT --- Series1: Jun-2006     Allergies    1  Augmentin TABS    Vitals  Signs [Data Includes: Current Encounter]   Recorded: 24YHL5685 02:09PM   Heart Rate: 281  Systolic: 120  Diastolic: 86  Weight: 325 lb   BMI Calculated: 43 1  BSA Calculated: 2 26    Physical Exam    Constitutional   General appearance: Abnormal   morbidly obese and appears tired  Eyes   Conjunctiva and lids: No swelling, erythema or discharge  Pupils and irises: Equal, round and reactive to light  Ears, Nose, Mouth, and Throat   External inspection of ears and nose: Normal     Oropharynx: Normal with no erythema, edema, exudate lesions, or ulcers  Pulmonary   Respiratory effort: No increased work of breathing or signs of respiratory distress  Auscultation of lungs: Clear to auscultation  Cardiovascular   Auscultation of heart: Normal rate and rhythm, normal S1 and S2, without murmurs  Examination of extremities for edema and/or varicosities: Normal     Lymphatic   Palpation of lymph nodes in neck: No lymphadenopathy      Psychiatric   Orientation to person, place, and time: Normal     Mood and affect: Normal         Right ankle tenderness and swelling  Left ankle swelling  Musculoskeletal - Joints, bones, and muscles: Abnormal  Palpation - bilateral knee crepitus  Skin - Skin and subcutaneous tissue: Normal    Neurologic - Sensation: Normal       Results/Data  Results   (1) COMPREHENSIVE METABOLIC PANEL 61FKC4869 95:26PS Darrion Matson   TW Order Number: [id number]    TW Order Number: [id number]  National Kidney Disease Education Program recommendations are as follows:  GFR calculation is accurate only with a steady state creatinine  Chronic Kidney disease less than 60 ml/min/1 73 sq  meters  Kidney failure less than 15 ml/min/1 73 sq  meters  Test Name Result Flag Reference   GLUCOSE,RANDM 114 mg/dL     If the patient is fasting, the ADA then defines impaired fasting glucose as > 100 mg/dL and diabetes as > or equal to 123 mg/dL     SODIUM 140 mmol/L  136-145   POTASSIUM 3 9 mmol/L  3 5-5 3   CHLORIDE 103 mmol/L  100-108   CARBON DIOXIDE 24 mmol/L  21-32   ANION GAP (CALC) 13 mmol/L  4-13   BLOOD UREA NITROGEN 10 mg/dL  5-25   CREATININE 0 83 mg/dL  0 60-1 30   Standardized to IDMS reference method   CALCIUM 9 3 mg/dL  8 3-10 1   BILI, TOTAL 0 39 mg/dL  0 20-1 00   ALK PHOSPHATAS 75 U/L     ALT (SGPT) 141 U/L H 12-78   AST(SGOT) 93 U/L H 5-45   ALBUMIN 3 6 g/dL  3 5-5 0   TOTAL PROTEIN 7 3 g/dL  6 4-8 2   eGFR Non-African American      >60 0 ml/min/1 73sq m     (1) C-REACTIVE PROTEIN 27Apr2016 08:33AM Darrion Matson   TW Order Number: [id number]    TW Order Number: [id number]     Test Name Result Flag Reference   C-REACT PROTEIN 6 2 mg/L H <3 0     (1) SED RATE 27Apr2016 08:33AM Darrion Matson   TW Order Number: [id number]     Test Name Result Flag Reference   SED RATE 13 mm/hour  0-20     (1) CBC/PLT/DIFF 27Apr2016 08:33AM [facility], Provider   Test ordered by: Xochitl Reynolds     Test Name Result Flag Reference   WBC COUNT 9 11 Thousand/uL  4 31-10 16   RBC COUNT 4 65 Million/uL  3 81-5 12   HEMOGLOBIN 13 9 g/dL 11 5-15 4   HEMATOCRIT 41 3 %  34 8-46  1   MCV 89 fL  82-98   MCH 29 9 pg  26 8-34 3   MCHC 33 7 g/dL  31 4-37 4   RDW 13 9 %  11 6-15 1   MPV 11 3 fL  8 9-12 7   PLATELET COUNT 318 Thousands/uL  149-390   NEUTROPHILS RELATIVE PERCENT 48 %  43-75   LYMPHOCYTES RELATIVE PERCENT 41 %  14-44   MONOCYTES RELATIVE PERCENT 9 %  4-12   EOSINOPHILS RELATIVE PERCENT 2 %  0-6   BASOPHILS RELATIVE PERCENT 0 %  0-1   NEUTROPHILS ABSOLUTE COUNT 4 39 Thousands/µL  1 85-7 62   LYMPHOCYTES ABSOLUTE COUNT 3 70 Thousands/µL  0 60-4 47   MONOCYTES ABSOLUTE COUNT 0 80 Thousand/µL  0 17-1 22   EOSINOPHILS ABSOLUTE COUNT 0 19 Thousand/µL  0 00-0 61   BASOPHILS ABSOLUTE COUNT 0 03 Thousands/µL  0 00-0 10     (1) TSH 44Osh7629 08:33AM [facility], Provider   Test ordered by: Meredith SORIA Order Number: [id number]        The recommended reference ranges for TSH during pregnancy are as follows:  First trimester 0 1 to 2 5 uIU/mL  Second trimester  0 2 to 3 0 uIU/mL  Third trimester 0 3 to 3 0 uIU/m     Test Name Result Flag Reference   TSH 2 528 uIU/mL  0 358-3 740     (1) T4, FREE 36Uob2250 08:33AM [facility], Provider   Test ordered by: Meredith Finch    Order Number: [id number]     Test Name Result Flag Reference   T4,FREE 0 99 ng/dL  0 76-1 46     (1) VITAMIN D 25-HYDROXY 62Aro5213 08:33AM [facility], Provider   Test ordered by: Meredith Finch     Test Name Result Flag Reference   VIT D 25-HYDROX 23 3 ng/mL L 30 0-100 0       Future Appointments    Date/Time Provider Specialty Site   07/15/2016 03:00 PM Monty Guerrero DO Rheumatology [facility]   06/17/2016 03:30 PM Nupur Brewster, [facility] Psychiatry [facility] [address] [employer]     Signatures   Electronically signed by : Joleen Thayer DO; May 20 2016  4:35PM EST                       (Author)

## 2018-01-17 NOTE — MISCELLANEOUS
Message  Pt has had difficulties with generic wellbutrin- not sleeping- insurance charging $3000 for brand wellbutrin despite prior authorization so she unable to obtain this   Will use klonopin to promote sleep and she is appealing the insurance re:wellbutrin      Signatures   Electronically signed by : Indu Abreu, [facility]; Mar 10 2016 12:50PM EST                       (Author)

## 2018-01-17 NOTE — PROGRESS NOTES
Assessment    1  Rheumatoid arthritis of multiple sites with negative rheumatoid factor (714 0) (M06 09)   2  NSAID long-term use (V58 64) (Z79 1)   3  On etanercept therapy (V58 69) (Z79 899)   4  Long-term use of hydroxychloroquine (V58 69) (Z79 899)   5  Controlled type 2 diabetes mellitus with diabetic nephropathy, without long-term current   use of insulin (250 40,583 81) (E11 21)   6  CLARK (nonalcoholic steatohepatitis) (571 8) (K75 81)   7  Polycystic ovarian syndrome (256 4) (E28 2)   8  Obesity (278 00) (E66 9)    Plan    1  TiZANidine HCl - 4 MG Oral Tablet; TAKE 1/2 TO 1 TABLET 3 TIMES DAILY AS   NEEDED    2  (1) CBC/PLT/DIFF; Status:Active; Requested for:64Qjm9011;    3  (1) COMPREHENSIVE METABOLIC PANEL; Status:Active; Requested for:81Cln9910;    4  (1) C-REACTIVE PROTEIN; Status:Active; Requested for:57Bng9896;    5  (1) SED RATE; Status:Active; Requested for:43Xnq0787;    6  Call [phone] if: The pain seems worse ; Status:Complete;   Done: 79RDU8267   7  Call [phone] if: The symptoms seem worse ; Status:Complete;   Done:   39UPQ8954   8  Call [phone] if: You have questions or concerns about your problem ;   Status:Complete;   Done: 35SCC3163   9  Follow-up visit in 3 months Evaluation and Treatment  Follow-up  Status: Complete    Done: 29ARI6072    10  Enbrel SureClick 50 MG/ML Subcutaneous Solution Auto-injector; Inject 50 mg    under the skin once weekly    11  Hydroxychloroquine Sulfate 200 MG Oral Tablet; take 1 tablet twice daily after    meals    12  Ergocalciferol 65145 UNIT CAPS; TAKE 1 CAPSULE ONCE WEEKLY    Discussion/Summary    This is a 74-year-old female presenting today for follow-up for rheumatoid arthritis  The patient states that she did have gastric bypass in March and has been off of medications for her rheumatoid arthritis since then  She states that she's been feeling very well aside from some upper and lower back pain   She denies any other joint pain or swelling  She has no morning stiffness at this time  She continues to have some difficulty with sleep due to her back pain but does utilize Zanaflex with relief  She denies nonrestorative sleep or fatigue  She has started vitamin D wafers of 50,000 IUs weekly  This was prescribed to her through her bariatric surgeon  She states that she does have plans to resume her Enbrel and hydroxychloroquine in 2 weeks  On physical examination, there is no active synovitis  Patient has normal passive range of motion of both upper and lower extremities  She has no tenderness of her joints however there is crepitus of the knees  Patient's most recent labs reveal an elevated CRP of 5 0 improved since previous study of 9 9 she has an elevated sedimentation rate of 30, as well as a low vitamin D level of 18 8  Patient CBC and CMP are within normal range  At this time patient's history and physical examination is most consistent with rheumatoid arthritis which appears to be mildly active at this time off medication  Patient will follow-up with her surgeon in 2 weeks and we will plan to restart Enbrel and hydroxychloroquine  She will continue with vitamin D supplementation  She will return to the office in 3 months time for further evaluation however we'll contact the office in the interim if she has any further questions or concerns  Counseling  Rheumatology Counseling Documentation: The patient was counseled regarding diagnostic results, instructions for management, prognosis, patient and family education, risks and benefits of treatment options and importance of compliance with treatment  Chief Complaint  F/U RA   Patient is here today for follow up of chronic conditions described in HPI  History of Present Illness  Patient is in the office today for follow up for RA  had gastric bypass  Off all RA medications at this time  Pain in the upper back and the low back  no other joint pain  No joint swelling   no Am stiffness  +Difficulty with sleep due to pain  But zanaflex helps  no non restorative sleep  No fatigue  Vitamin D 70035 wafer just started through bariatric surgeon  To resume RA meds in about two weeks  RAPID3: 6 7 /30      Review of Systems    Constitutional: anorexia, but no fever, no recent weight gain, fatigue and no chills    The patient presents with complaints of recent 45 lb weight loss (surgery)  HEENT: dryness mouth, but no blurred vision, no double vision, no amaurosis fugax, no dryness of the eyes, no eye pain, no erythema eye(s), no mouth sores, not feeling congested and no sore throat  Cardiovascular: no chest pain or pressure, no dyspnea on exertion and no swelling in the arms or legs  Respiratory: no unusual or persistent cough, no shortness of breath and no pleurisy  Gastrointestinal: heartburn, but no abdominal pain, no nausea, no vomiting, no diarrhea, no constipation, no melena and no BRBPR  Genitourinary: No foamy urine, but no dysuria and no hematuria  Musculoskeletal: as noted in HPI  Integumentary alopecia and photosensitivity, but no rash, no Raynaud's and no nail changes  Endocrine no polyuria and no polydipsia  Hematologic/Lymphatic: no unusual bleeding and no tendency for easy bruising  Neurological: no headache, no vertigo or dizziness, no tingling and no weakness  Psychiatric: insomnia, but no non-restorative sleep  Active Problems    1  Anxiety (300 00) (F41 9)   2  Aspiration into airway, initial encounter (934 9) (T17 908A)   3  Benign neoplasm of pituitary gland (227 3) (D35 2)   4  Chondromalacia, patella (717 7) (M22 40)   5  Controlled type 2 diabetes mellitus with diabetic nephropathy, without long-term current   use of insulin (250 40,583 81) (E11 21)   6  Depression (311) (F32 9)   7  Exertional dyspnea (786 09) (R06 09)   8  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)   9  High protein C activity level (790 92) (R79 1)   10   High risk medication use (V58 69) (Z79 899)   11  Hyperlipidemia (272 4) (E78 5)   12  Hyperprolactinemia (253 1) (E22 1)   13  Hypothyroidism (244 9) (E03 9)   14  Left foot pain (729 5) (M79 672)   15  Long-term use of hydroxychloroquine (V58 69) (Z79 899)   16  MDD (major depressive disorder), recurrent episode, severe (296 33) (F33 2)   17  Migraine (346 90) (G43 909)   18  Migraine, unspecified, not intractable, without status migrainosus (346 90) (G43 909)   19  Mood disorder (296 90) (F39)   20  CLARK (nonalcoholic steatohepatitis) (571 8) (K75 81)   21  Need for influenza vaccination (V04 81) (Z23)   22  NSAID long-term use (V58 64) (Z79 1)   23  Obesity (278 00) (E66 9)   24  On etanercept therapy (V58 69) (Z79 899)   25  Pain, foot (729 5) (M79 673)   26  Pain, joint, multiple sites (719 49) (M25 50)   27  Pap smear for cervical cancer screening (V76 2) (Z12 4)   28  Pituitary microadenoma (227 3) (D35 2)   29  Polycystic ovarian syndrome (256 4) (E28 2)   30  Post traumatic stress disorder (PTSD) (309 81) (F43 10)   31  Prediabetes (790 29) (R73 09)   32  Primary stabbing headache (339 85) (G44 85)   33  Rheumatoid arthritis of multiple sites with negative rheumatoid factor (714 0) (M06 09)   34  Screening for depression (V79 0) (Z13 89)   35  Screening for diabetic retinopathy (V80 2) (Z13 5)   36  Vitamin D deficiency (268 9) (E55 9)    Past Medical History    1  History of Abdominal pain, RLQ (right lower quadrant) (789 03) (R10 31)   2  History of Abnormal LFTs (liver function tests) (790 6) (R79 89)   3  History of Abnormal weight gain (783 1) (R63 5)   4  History of Acute knee pain (719 46) (M25 569)   5  History of Acute tonsillitis (463) (J03 90)   6  History of Adalimumab (Humira) long-term use (V58 69) (Z79 899)   7  History of Cat bite (879 8,E906 3) (W55 01XA)   8  History of Common migraine without aura (346 10) (G43 009)   9  History of Depression with anxiety (300 4) (F41 8)   10   History of Diabetes Mellitus (250 00)   11  History of Elevated C-reactive protein (790 95) (R79 82)   12  History of Fatigue (780 79) (R53 83)   13  History of Hip pain (719 45) (M25 559)   14  History of common cold (V12 09) (Z86 19)   15  History of diabetes mellitus (V12 29) (Z86 39)   16  History of headache (V13 89) (Z87 898)   17  History of polyarthritis (V13 4) (Z87 39)   18  History of rheumatoid arthritis (V13 4) (Z87 39)   19  History of sinusitis (V12 69) (Z87 09)   20  History of Need for influenza vaccination (V04 81) (Z23)   21  History of Numbness (782 0) (R20 0)   22  History of Seronegative rheumatoid arthritis (714 0) (M06 00)   23  History of Sprain of ankle, left (845 00) (S93 402A)   24  History of Tendonitis (726 90) (M77 9)   25  History of Tension type headache (339 10) (G44 209)   26  History of Tingling in extremities (782 0) (R20 2)   27  History of TMJ Appearance   28  History of Trochanteric bursitis of right hip (726 5) (M70 61)   29  History of Vaginal candidiasis (112 1) (B37 3)   30  History of Viral URI with cough (465 9) (J06 9,B97 89)    The active problems and past medical history were reviewed and updated today  Surgical History    1  History of Dental Surgery   2  History of Gastrectomy Sleeve   3  History of Nasal Septal Deviation Repair   4  History of Oral Surgery Tooth Extraction   5  History of Tonsillectomy    The surgical history was reviewed and updated today  Family History  Mother    1  Family history of Denial Of Any Significant Medical History   2  Family history of arthritis (V17 7) (Z82 61)   3  Family history of psoriasis (V19 4) (Z84 0)  Father    4  Family history of Denial Of Any Significant Medical History  Paternal Grandmother    5  Family history of Diabetes  Maternal Grandfather    6  Family history of Black lung disease  Paternal Grandfather    9  Family history of Cancer  Family History    8  Family history of CAD (coronary artery disease)   9   Family history of Colon cancer   10  Denied: Family history of Crohn's disease   6  Family history of diabetes mellitus (V18 0) (Z83 3)   12  Denied: Family history of rheumatoid arthritis   13  Denied: Family history of systemic lupus erythematosus   14  Denied: Family history of ulcerative colitis    The family history was reviewed and updated today  Social History    · Caffeine use (V49 89) (F15 90)   · Currently sexually active   · Full-time employment   · High school or GED   · Never a smoker   · No drug use   · Occasional alcohol use   · Patient has living will (V49 89) (Z78 9)   ·   The social history was reviewed and updated today  The social history was reviewed and is unchanged  Current Meds   1  Benzonatate 200 MG Oral Capsule; TAKE 1 CAPSULE 3 TIMES DAILY AS NEEDED; Therapy: 34Ygx4188 to (Evaluate:02May2017)  Requested for: 67Ars0862; Last   Rx:25Apr2017 Ordered   2  ClonazePAM 1 MG Oral Tablet; TAKE 1 TABLET Bedtime; Last Rx:47Pfq2275 Ordered   3  Enbrel SureClick 50 MG/ML Subcutaneous Solution Auto-injector; Inject 50 mg under the   skin once weekly; Therapy: 31Fjc3776 to (Evaluate:30Jun2017)  Requested for: 72DSO7889; Last   Rx:13Jan2017 Ordered   4  Ergocalciferol 75770 UNIT CAPS; TAKE 1 CAPSULE ONCE WEEKLY; Therapy: (Recorded:93Zfp8616) to Recorded   5  Hydroxychloroquine Sulfate 200 MG Oral Tablet; take 1 tablet twice daily after meals; Therapy: 23KYC8597 to (Evaluate:08Jan2018)  Requested for: 70DAF6603; Last   Rx:13Jan2017 Ordered   6  Metoclopramide HCl - 10 MG Oral Tablet; one q6hrs prn nausea; Therapy: 98Dzm7396 to (Evaluate:86Xcn6425)  Requested for: 03Uzs5362; Last   Rx:27Nca2418 Ordered   7  Protonix 40 MG Oral Tablet Delayed Release; Therapy: (Recorded:33Plm2333) to Recorded   8  QUEtiapine Fumarate 50 MG Oral Tablet; TAKE 1 TABLET AT W/ Supper; Therapy: 18BMF7396 to (Evaluate:87Qtb9728)  Requested for: 45Ivb4253; Last   Rx:02Pab3386 Ordered   9   Rosuvastatin Calcium 5 MG Oral Tablet; Take 1 tablet daily; Therapy: 65NOO8723 to ([phone])  Requested for: 25Oct2016; Last   Rx:11Dba9582 Ordered   10  SUMAtriptan Succinate 6 MG/0 5ML Subcutaneous Solution Auto-injector; inject 0 5ml at    onset of headache and may repeat in 2 hours if needed,    max 2inj/day max 3days/week; Therapy: 02WDG0909 to (Evaluate:12Mar2017)  Requested for: 39VFJ1916; Last    Rx:11Jan2017 Ordered   11  SUMAtriptan Succinate 6 MG/0 5ML Subcutaneous Solution; inject 0 5ml at onset of    headache and may repeat in 2 hours if needed, max 2inj/day max 3days/week; Therapy: 98Kid0243 to (Last Rx:32Nqv0455)  Requested for: 40Ilw6213 Ordered   12  TiZANidine HCl - 4 MG Oral Tablet; TAKE 1/2 TO 1 TABLET 3 TIMES DAILY AS NEEDED; Therapy: 33COY1183 to (Evaluate:13Apr2017)  Requested for: 99VHW5122; Last    Rx:13Jan2017 Ordered   13  Verapamil HCl - 40 MG Oral Tablet; One Tablet twice daily; Therapy: 53Syq2024 to Recorded    The medication list was reviewed and updated today  Immunizations  Influenza --- Belvia Fries: Temporarily Deferred: Pt refuses, As of: 94WQY2218, Defer for 1 Years; Hoang Kelly: 26-Aug-2014Gaspar Fears: 82-Fta-1547Bbkqdn Juan Daniel: 12-Oct-2016; Series5: [id number]   PCV --- Series1: 02-Feb-2016   Td/DT --- Series1: 01-Jun-2006     Allergies    1  Augmentin TABS   2  NSAIDs    Vitals  Signs   Recorded: 28CYN0085 09:20AM   Heart Rate: 64  Systolic: 420  Diastolic: 84  Height: 5 ft 6 in  Weight: 226 lb 2 oz  BMI Calculated: 36 5  BSA Calculated: 2 11    Physical Exam    Constitutional   General appearance: Abnormal   obese  Eyes   Conjunctiva and lids: No swelling, erythema or discharge  Pupils and irises: Equal, round and reactive to light  Ears, Nose, Mouth, and Throat   External inspection of ears and nose: Normal     Oropharynx: Normal with no erythema, edema, exudate lesions, or ulcers  Pulmonary   Respiratory effort: No increased work of breathing or signs of respiratory distress  Auscultation of lungs: Clear to auscultation  Cardiovascular   Auscultation of heart: Normal rate and rhythm, normal S1 and S2, without murmurs  Examination of extremities for edema and/or varicosities: Normal     Lymphatic   Palpation of lymph nodes in neck: No lymphadenopathy  Psychiatric   Orientation to person, place, and time: Normal     Mood and affect: Normal         Right Upper Extremity: Normal ROM  Left Upper Extremity: Normal ROM  Right Lower Extremity: Normal ROM  Left Lower Extremity: Normal ROM  Musculoskeletal - Joints, bones, and muscles: Abnormal  Palpation - lower mid-lumbar, right lower lumbar and left lower lumbar tenderness and bilateral knee crepitus  Right hand: All MCP, PIP and DIP joints are normal  Left Hand: All MCP, PIP and DIP joints are normal    Right foot: All MTP, PIP and DIP joints are normal  Left foot: All MTP, PIP and DIP joints are normal       Results/Data  (1) VITAMIN D 25-HYDROXY 57IIR5072 07:19AM EPIC, Provider   Test ordered by: Muriel Sesay     Test Name Result Flag Reference   VIT D 25-HYDROX 18 8 ng/mL L 30 0-100 0   This assay is a certified procedure of the CDC Vitamin D Standardization Certification Program (VDSCP)     Deficiency <20ng/ml   Insufficiency 20-30ng/ml   Sufficient  ng/ml     *Patients undergoing fluorescein dye angiography may retain small amounts of fluorescein in the body for 48-72 hours post procedure  Samples containing fluorescein can produce falsely elevated Vitamin D values  If the patient had this procedure, a specimen should be resubmitted post fluorescein clearance  (1) CBC/PLT/DIFF 74HMG9947 06:38AM Mirian Rowland    Order Number: [id number]     Test Name Result Flag Reference   WBC COUNT 9 23 Thousand/uL  4 31-10 16   RBC COUNT 4 55 Million/uL  3 81-5 12   HEMOGLOBIN 13 8 g/dL  11 5-15 4   HEMATOCRIT 41 0 %  34 8-46  1   MCV 90 fL  82-98   MCH 30 3 pg  26 8-34 3   MCHC 33 7 g/dL  31 4-37 4 RDW 14 7 %  11 6-15 1   MPV 12 2 fL  8 9-12 7   PLATELET COUNT 091 Thousands/uL  149-390   NEUTROPHILS RELATIVE PERCENT 49 %  43-75   LYMPHOCYTES RELATIVE PERCENT 41 %  14-44   MONOCYTES RELATIVE PERCENT 7 %  4-12   EOSINOPHILS RELATIVE PERCENT 3 %  0-6   BASOPHILS RELATIVE PERCENT 0 %  0-1   NEUTROPHILS ABSOLUTE COUNT 4 46 Thousands/? ??L  1 85-7 62   LYMPHOCYTES ABSOLUTE COUNT 3 81 Thousands/? ??L  0 60-4 47   MONOCYTES ABSOLUTE COUNT 0 68 Thousand/? ??L  0 17-1 22   EOSINOPHILS ABSOLUTE COUNT 0 25 Thousand/? ??L  0 00-0 61   BASOPHILS ABSOLUTE COUNT 0 03 Thousands/? ??L  0 00-0 10   - Patient Instructions: This bloodwork is non-fasting  Please drink two glasses of water morning of bloodwork  (1) COMPREHENSIVE METABOLIC PANEL 93PMD6977 13:52TD [facility]   TW Order Number: [id number]     Test Name Result Flag Reference   SODIUM 142 mmol/L  136-145   POTASSIUM 3 3 mmol/L L 3 5-5 3   CHLORIDE 104 mmol/L  100-108   CARBON DIOXIDE 24 mmol/L  21-32   ANION GAP (CALC) 14 mmol/L H 4-13   BLOOD UREA NITROGEN 9 mg/dL  5-25   CREATININE 0 85 mg/dL  0 60-1 30   Standardized to IDMS reference method   CALCIUM 9 1 mg/dL  8 3-10 1   BILI, TOTAL 0 80 mg/dL  0 20-1 00   ALK PHOSPHATAS 56 U/L     ALT (SGPT) 73 U/L  12-78   AST(SGOT) 27 U/L  5-45   ALBUMIN 3 9 g/dL  3 5-5 0   TOTAL PROTEIN 7 5 g/dL  6 4-8 2   eGFR Non-African American      >60 0 ml/min/1 73sq m   [facility] Disease Education Program recommendations are as follows:  GFR calculation is accurate only with a steady state creatinine  Chronic Kidney disease less than 60 ml/min/1 73 sq  meters  Kidney failure less than 15 ml/min/1 73 sq  meters     GLUCOSE FASTING 81 mg/dL  65-99     (1) C-REACTIVE PROTEIN 38MTY4544 06:38AM [employer]   TW Order Number: [id number]     Test Name Result Flag Reference   C-REACT PROTEIN 5 0 mg/L H <3 0     (1) SED RATE 11PMQ0548 06:38AM John Frias   TW Order Number: [id number] Test Name Result Flag Reference   SED RATE 30 mm/hour H 0-20       Attending Note  Collaborating Physician: I did not interview and examine the patient, I discussed the case with the Advanced Practitioner and reviewed the note and I agree with the Advanced Practitioner note        Future Appointments    Date/Time Provider Specialty Site   08/18/2017 09:20 AM Emanuel Bloom [facility] [address] [facility]     Signatures   Electronically signed by : Ema Mcnulty [facility]; May 12 2017  9:51AM EST                       (Author)    Electronically signed by : Gisele Castillo DO; May 12 2017  1:28PM EST                       (Author)

## 2018-01-17 NOTE — PSYCH
Progress Note  Individual Psychotherapy 60 min minutes provided today  Goals addressed in session:   oKjo Graham PREPARING FOR THE FUTURE  Dax Field affect is brighter and mood is euthymic this week, as Leon Miranda shares that she is feeling more resolved about her decision to break up with Mike Sacks  Leon Miranda shares that Calvin Sacks has been communicating with her about some issues lately, which is frustrating to her, as she seems more open to communicating now than before the break up  Leon Miranda has decided that she cannot pretend just to be a friend to Calvin Sacks and she will not continue to be in contact with him  Leon Miranda is preparing to move into her own apartment and she is getting more excited  Leon Miranda shares that she is "feeling really good about this decision   like I know this is where I am supposed to be"  Leon Miranda is excited as she shares that she recently found a certificate program she can 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

## 2018-01-18 NOTE — PSYCH
Progress Note  Individual Psychotherapy 60 min minutes provided today  Goals addressed in session:   GOALS- STEPS TOWARDS HEALTHY COPING SKILLS/ACCEPTANCE  Corinnas affect is less tense and a little brighter  She relays that she started Yoga on Monday and she found it very relaxing  Anthony Kelly also had a dream on this same night and Anthony Davidson was in it  She does not recall what the dream was about, only that she felt that Anthony Davidson was giving her a message- that he is ok and that it is ok to accept things the way they are, she can move on, and also not to "sweat the small stuff"  Anthony Kelly shares that she had a sense of complete peace after this and she has been feeling better all week  Anthony Kelly relayed some frustrating events with health care providers, but was able to keep perspective  Current suicide risk is low   Assessment  Mood is less angry/anxious and depressed  Anthony Kelly feels like she had an experience in her dream that helped her being More accepting of her present situation  Plan  See in 1 week- therapist asked Atnhony Kelly to consider whether or not she wanted to go forward with more EMDR  Signatures   Electronically signed by :  Lucas Lesch, [place]; Feb 24 9832 10:56PM EST                       (Author)

## 2018-01-18 NOTE — PSYCH
Progress Note  Individual Psychotherapy 60 min minutes provided today  Goals addressed in session:   [address] shares her perfomrance eval with therapist and expresses how upset she is about it- she feels unappreciated and misjudged, she left her mtg with hr supervisor crying- she did not sign it and told them she needs to have her  review it  Cecelia Alonso expresses feeling upset and resentful of her current life situation and her many losses  Therapist raised the question to Cecelia Alonso if there is something lacking in her life that could give her a bigger perspective- to give her meaning beyond her disappointments in her life and all the losses  Cecelia Alonso began to cry and agreed that this is lacking in her life and acknowledged that she harbors a lot of resentment and that it is unhealthy fo her  Current Pain Assessment: moderate to severe   Current suicide risk is low   Assessment  Corinnas depression has persisted and Frannie's negative outlook has increased recently, while she is experiencing more disappointments  Plan  See weekly- explore next week with Cecelia Alonso the subject raised tonight- the need for meaning- spiritual or otherwise  Signatures   Electronically signed by : Pasquale Thomas [place]; Feb 12 1095  8:46PM EST                       (Author)    Electronically signed by :  Pasquale Thomas [place]; Feb 24 0010  3:17PM EST                       (Author)

## 2018-01-18 NOTE — PROGRESS NOTES
Assessment    1  Rheumatoid arthritis of multiple sites with negative rheumatoid factor (714 0) (M06 09)   2  NSAID long-term use (V58 64) (Z79 1)   3  On etanercept therapy (V58 69) (Z79 899)   4  Long-term use of hydroxychloroquine (V58 69) (Z79 899)   5  Controlled type 2 diabetes mellitus with diabetic nephropathy, without long-term current   use of insulin (250 40,583 81) (E11 21)   6  CLARK (nonalcoholic steatohepatitis) (571 8) (K75 81)   7  Polycystic ovarian syndrome (256 4) (E28 2)   8  Obesity (278 00) (E66 9)    Plan    1  TiZANidine HCl - 4 MG Oral Tablet; TAKE 1/2 TO 1 TABLET 3 TIMES DAILY AS   NEEDED   2  (1) CBC/PLT/DIFF; Status:Active; Requested for:30Mar2017;    3  (1) COMPREHENSIVE METABOLIC PANEL; Status:Active; Requested for:30Mar2017;    4  (1) C-REACTIVE PROTEIN; Status:Active; Requested for:30Mar2017;    5  (1) SED RATE; Status:Active; Requested for:30Mar2017;    6  Follow-up visit in 3 months Evaluation and Treatment  Follow-up  Status: Complete    Done: 20OLJ1634    1  Hydroxychloroquine Sulfate 200 MG Oral Tablet; take 1 tablet twice daily after   meals    8  Call [phone] if: The pain seems worse ; Status:Complete;   Done: 98RKS6724   9  Call [phone] if: The symptoms seem worse ; Status:Complete;   Done:   00DEJ5023   14  Call [phone] if: You have questions or concerns about your problem ;    Status:Complete;   Done: 61HME6120    Discussion/Summary    76-year-old female with rheumatoid arthritis  Discussed with patient physical exam findings, lab results, impression, and plan  [place] states that she has been feeling well with her current regimen of Enbril, hydroxychloroquine, and Nabumetone  We did discuss with her that her CRP is 9 9 which is increased from 9 1  At the previous visit we had discussed with her possibly switching to medication such as Orencia   Despite the increase in her CRP her symptoms are well-controlled so at this time we will continue with current regimen  We will monitor her CRP and if it continues to increase will consider switching her medication  We've also discussed with her taking muscle relaxant to help with her muscle spasms  We've have given her prescription for tizanidine and made her aware that it may cause drowsiness, so she may start taking it at night  She may also continue with use of the TENS unit  We will see her for follow-up in 3 months and she is to get bloodwork done prior to returning  Possible side effects of new medications were reviewed with the patient/guardian today  The treatment plan was reviewed with the patient/guardian  The patient/guardian understands and agrees with the treatment plan   The patient, patient's family was counseled regarding instructions for management, patient and family education, impressions, risks and benefits of treatment options, importance of compliance with treatment  total time of encounter was 20 minutes and Greater than 10 minutes was spent counseling  Counseling  Rheumatology Counseling Documentation: The patient and patient's family was counseled regarding diagnostic results, instructions for management and impressions  Chief Complaint  F/U RA   Patient is here today for follow up of chronic conditions described in HPI  History of Present Illness  27year old female with history of rheumatoid arthritis of multiple sites here for follow-up  Patient was last seen October 26, 2016  At that time patient was instructed to continue with her current regimen of Enbrel and Plaquenil  She also complained of stiffness of her neck and hands, and we recommended use of TENS unit  Today patient states that she has been stable  She denies any changes in her symptoms since her last visit  She reports that once or twice a month she may have flare up of her symptoms  She complains mostly of stiffness in her neck, upper back, shoulders, and hands   She reports pain at the sites that is very mild, stating it is 2 to 3 out of 10 in intensity  Her pain is managed well with nabumetone and she states over the course of a month she may use only 4 to 5 pills  Stiffness is worse in the morning and improves within 10 minutes of getting up  She intermittently has mild pain of the right hip but denies pain of the knee, ankle, wrist and elbows  Review of Systems    Constitutional: fatigue, but no fever, no chills and no anorexia  HEENT: dryness of the eyes and dryness mouth, but no blurred vision, no double vision and no amaurosis fugax  Cardiovascular: no chest pain or pressure, no dyspnea on exertion and no palpitations present  Respiratory: no unusual or persistent cough, no shortness of breath, no pleurisy and no wheezing  Gastrointestinal: no abdominal pain, no nausea, no vomiting, no dysphagia, no odynophagia, no heartburn, no diarrhea, no melena and no BRBPR  Genitourinary: no dysuria and no increase in frequency  Musculoskeletal: as noted in HPI  Integumentary no rash, no Raynaud's, no alopecia and no nail changes  Endocrine no polydipsia and no polyphagia  Neurological: no headache, no vertigo or dizziness and no tingling  Psychiatric: non-restorative sleep  ROS reviewed  Active Problems    1  Anxiety (300 00) (F41 9)   2  Benign neoplasm of pituitary gland (227 3) (D35 2)   3  Chondromalacia, patella (717 7) (M22 40)   4  Controlled type 2 diabetes mellitus with diabetic nephropathy, without long-term current   use of insulin (250 40,583 81) (E11 21)   5  Depression (311) (F32 9)   6  Exertional dyspnea (786 09) (R06 09)   7  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)   8  High protein C activity level (790 92) (R79 1)   9  High risk medication use (V58 69) (Z79 899)   10  Hyperlipidemia (272 4) (E78 5)   11  Hyperprolactinemia (253 1) (E22 1)   12  Hypothyroidism (244 9) (E03 9)   13  Left foot pain (729 5) (M79 672)   14   Long-term use of hydroxychloroquine (V58 69) (Z79 899)   15  MDD (major depressive disorder), recurrent episode, severe (296 33) (F33 2)   16  Migraine (346 90) (G43 909)   17  Migraine, unspecified, not intractable, without status migrainosus (346 90) (G43 909)   18  Mood disorder (296 90) (F39)   19  CLARK (nonalcoholic steatohepatitis) (571 8) (K75 81)   20  Need for influenza vaccination (V04 81) (Z23)   21  NSAID long-term use (V58 64) (Z79 1)   22  Obesity (278 00) (E66 9)   23  Pain, foot (729 5) (M79 673)   24  Pain, joint, multiple sites (719 49) (M25 50)   25  Pituitary microadenoma (227 3) (D35 2)   26  Polycystic ovarian syndrome (256 4) (E28 2)   27  Post traumatic stress disorder (PTSD) (309 81) (F43 10)   28  Prediabetes (790 29) (R73 09)   29  Primary stabbing headache (339 85) (G44 85)   30  Rheumatoid arthritis of multiple sites with negative rheumatoid factor (714 0) (M06 09)   31  Vitamin D deficiency (268 9) (E55 9)    Past Medical History    1  History of Abdominal pain, RLQ (right lower quadrant) (789 03) (R10 31)   2  History of Abnormal LFTs (liver function tests) (790 6) (R79 89)   3  History of Abnormal weight gain (783 1) (R63 5)   4  History of Acute knee pain (719 46) (M25 569)   5  History of Acute tonsillitis (463) (J03 90)   6  History of Adalimumab (Humira) long-term use (V58 69) (Z79 899)   7  History of Cat bite (879 8,E906 3) (W55 01XA)   8  History of Common migraine without aura (346 10) (G43 009)   9  History of Depression with anxiety (300 4) (F41 8)   10  History of Diabetes Mellitus (250 00)   11  History of Elevated C-reactive protein (790 95) (R79 82)   12  History of Fatigue (780 79) (R53 83)   13  History of Hip pain (719 45) (M25 559)   14  History of common cold (V12 09) (Z86 19)   15  History of diabetes mellitus (V12 29) (Z86 39)   16  History of headache (V13 89) (Z87 898)   17  History of polyarthritis (V13 4) (Z87 39)   18  History of rheumatoid arthritis (V13 4) (Z87 39)   19   History of sinusitis (V12 69) (Z87 09)   20  History of Need for influenza vaccination (V04 81) (Z23)   21  History of Numbness (782 0) (R20 0)   22  History of Seronegative rheumatoid arthritis (714 0) (M06 00)   23  History of Sprain of ankle, left (845 00) (S93 402A)   24  History of Tendonitis (726 90) (M77 9)   25  History of Tension type headache (339 10) (G44 209)   26  History of Tingling in extremities (782 0) (R20 2)   27  History of TMJ Appearance   28  History of Trochanteric bursitis of right hip (726 5) (M70 61)   29  History of Vaginal candidiasis (112 1) (B37 3)   30  History of Viral URI with cough (465 9) (J06 9,B97 89)    The active problems and past medical history were reviewed and updated today  Surgical History    1  History of Dental Surgery   2  History of Nasal Septal Deviation Repair   3  History of Oral Surgery Tooth Extraction   4  History of Tonsillectomy    The surgical history was reviewed and updated today  Family History  Mother    1  Family history of Denial Of Any Significant Medical History   2  Family history of arthritis (V17 7) (Z82 61)   3  Family history of psoriasis (V19 4) (Z84 0)  Father    4  Family history of Denial Of Any Significant Medical History  Paternal Grandmother    5  Family history of Diabetes  Maternal Grandfather    6  Family history of Black lung disease  Paternal Grandfather    9  Family history of Cancer  Family History    8  Family history of CAD (coronary artery disease)   9  Family history of Colon cancer   10  Denied: Family history of Crohn's disease   6  Family history of diabetes mellitus (V18 0) (Z83 3)   12  Denied: Family history of rheumatoid arthritis   13  Denied: Family history of systemic lupus erythematosus   14  Denied: Family history of ulcerative colitis    The family history was reviewed and updated today         Social History    · Caffeine use (V49 89) (F15 90)   · Currently sexually active   · Full-time employment   · High school or GED   · Never a smoker   · No drug use   · Occasional alcohol use   · Patient has living will (V49 89) (Z78 9)   ·   The social history was reviewed and updated today  The social history was reviewed and is unchanged  Current Meds   1  ClonazePAM 1 MG Oral Tablet; TAKE 1/2 -1 TABLET EVERY 12 HOURS AS NEEDED; Last   Rx:12Oct2016 Ordered   2  Dymista 137-50 MCG/ACT Nasal Suspension; INSTILL 2 SQUIRT Daily in each nostril; Therapy: 12NKO7996 to (Sánchez Johnson)  Requested for: 19AXR9262; Last   Rx:24Sep2015 Ordered   3  Enbrel SureClick 50 MG/ML Subcutaneous Solution Auto-injector; Inject 50 mg under the   skin once weekly; Therapy: 51Jtt2323 to (Evaluate:30Jun2017)  Requested for: 36PAX3838; Last   Rx:13Jan2017 Ordered   4  Ergocalciferol 55859 UNIT CAPS; TAKE 1 CAPSULE ONCE WEEKLY; Therapy: (Recorded:26Oct2016) to Recorded   5  Glucophage  MG Oral Tablet Extended Release 24 Hour; 2,0mg total daily in AM   (4 tabs) Recorded   6  Hydroxychloroquine Sulfate 200 MG Oral Tablet; take 1 tablet twice daily after meals; Therapy: 73WZH8890 to (Evaluate:18Jle2784)  Requested for: 97EUF3238; Last   Rx:86Ewn8391 Ordered   7  Indomethacin 25 MG Oral Capsule; 1 capsule TID prn headache with food and water  Do   not take with advil; Therapy: 09QVH8240 to ([place])  Requested for: 23Vjw9317; Last   Rx:01Sep2016 Ordered   8  Metoclopramide HCl - 10 MG Oral Tablet; one q6hrs prn nausea; Therapy: 78Wwu5493 to (Evaluate:71Wbf6538)  Requested for: 39Blh7761; Last   Rx:35Jwa9042 Ordered   9  Nabumetone 750 MG Oral Tablet; TAKE 1 TABLET TWICE DAILY AFTER MEALS; Therapy: 93SMR4467 to (Evaluate:24Apr2017)  Requested for: 26Oct2016; Last   Rx:26Oct2016 Ordered   10  Ondansetron HCl - 4 MG Oral Tablet; one tablet every 8 hours as needed for nausea; Therapy: 91IFK2336 to (Last Rx:71Txu0282)  Requested for: 32UHH0809 Ordered   11  QUEtiapine Fumarate 50 MG Oral Tablet; TAKE 1 TABLET AT BEDTIME;     Therapy: 77IGN7052 to ( 30)  Requested for: 79CMO5935; Last    Rx:2016 Ordered   12  Rosuvastatin Calcium 5 MG Oral Tablet; Take 1 tablet daily; Therapy: 13NYA6507 to ()  Requested for: 2016; Last    Rx:2016 Ordered   13  Sucralfate 1 GM Oral Tablet; Take 1 tablet 30 minutes prior to taking indomethacin; Therapy: 17OAY6780 to (Reyes Richter)  Requested for: 2016; Last    Rx:2016 Ordered   14  SUMAtriptan Succinate 6 MG/0 5ML Subcutaneous Solution Auto-injector; inject 0 5ml at    onset of headache and may repeat in 2 hours if needed,    max 2inj/day max 3days/week; Therapy: 23DUH6171 to (Evaluate:2017)  Requested for: 05CTY1227; Last    Rx:2017 Ordered   15  SUMAtriptan Succinate 6 MG/0 5ML Subcutaneous Solution; inject 0 5ml at onset of    headache and may repeat in 2 hours if needed, max 2inj/day max 3days/week; Therapy: 01Atg9420 to (Last Rx:2016)  Requested for: 2016 Ordered   16  TraMADol HCl - 50 MG Oral Tablet; Take as directed by ordering provider; Therapy: (Recorded:2016) to Recorded   17  Verapamil HCl  MG Oral Capsule Extended Release 24 Hour; take 1 capsule    qhs;    Therapy: 2016 to (Reyes Richter)  Requested for: 2016; Last    Rx:2016 Ordered    The medication list was reviewed and updated today  Immunizations  Influenza --- Sandra Grow: Temporarily Deferred: Pt refuses, As of: 33XLO2520, Defer for 1 Years; Dell Fuller: 21-Itq-6594Hdsjeuttu Hipps: 83-Hff-2394Vavfbm Perkin-Oct-2016; Series5: [id number]   PCV --- Series1: 2016   Td/DT --- Series1: 2006     Allergies    1  Augmentin TABS    Vitals  Signs   Recorded: 54ILY6082 40:77RS   Systolic: 243  Diastolic: 88  Weight: 718 lb   BMI Calculated: 43 1  BSA Calculated: 2 26    Physical Exam    Constitutional   General appearance: No acute distress, well appearing and well nourished      Eyes   Conjunctiva and lids: No swelling, erythema or discharge  Pupils and irises: Equal, round and reactive to light  Ears, Nose, Mouth, and Throat   External inspection of ears and nose: Normal     Oropharynx: Normal with no erythema, edema, exudate lesions, or ulcers  Pulmonary   Respiratory effort: No increased work of breathing or signs of respiratory distress  Auscultation of lungs: Clear to auscultation  Cardiovascular   Auscultation of heart: Normal rate and rhythm, normal S1 and S2, without murmurs  Lymphatic   Palpation of lymph nodes in neck: No lymphadenopathy  Psychiatric   Orientation to person, place, and time: Normal     Mood and affect: Normal         Right Upper Extremity: Normal examination  Normal ROM  Normal strength  Left Upper Extremity: Normal examination  Normal ROM  Normal strength  Right Lower Extremity: Normal examination  Normal ROM  Normal strength  Left Lower Extremity: Normal examination  Normal ROM  Normal strength  Right hand: All MCP, PIP and DIP joints are normal  Left Hand: All MCP, PIP and DIP joints are normal    Right foot: All MTP, PIP and DIP joints are normal  Left foot: All MTP, PIP and DIP joints are normal       Results/Data  (1) CBC/PLT/DIFF 79WEX2483 08:17AM Zahraa Drivers    Order Number: [id number]     Test Name Result Flag Reference   WBC COUNT 11 66 Thousand/uL H 4 31-10 16   RBC COUNT 5 05 Million/uL  3 81-5 12   HEMOGLOBIN 15 8 g/dL H 11 5-15 4   HEMATOCRIT 46 6 % H 34 8-46  1   MCV 92 fL  82-98   MCH 31 3 pg  26 8-34 3   MCHC 33 9 g/dL  31 4-37 4   RDW 13 5 %  11 6-15 1   MPV 11 7 fL  8 9-12 7   PLATELET COUNT 020 Thousands/uL  149-390   nRBC AUTOMATED 0 /100 WBCs     NEUTROPHILS RELATIVE PERCENT 50 %  43-75   LYMPHOCYTES RELATIVE PERCENT 38 %  14-44   MONOCYTES RELATIVE PERCENT 9 %  4-12   EOSINOPHILS RELATIVE PERCENT 3 %  0-6   BASOPHILS RELATIVE PERCENT 0 %  0-1   NEUTROPHILS ABSOLUTE COUNT 5 86 Thousands/?L  1 85-7 62   LYMPHOCYTES ABSOLUTE COUNT 4 43 Thousands/?L  0 60-4 47   MONOCYTES ABSOLUTE COUNT 0 99 Thousand/?L  0 17-1 22   EOSINOPHILS ABSOLUTE COUNT 0 29 Thousand/?L  0 00-0 61   BASOPHILS ABSOLUTE COUNT 0 04 Thousands/?L  0 00-0 10   - Patient Instructions: This bloodwork is non-fasting  Please drink two glasses of water morning of bloodwork  - Patient Instructions: This bloodwork is non-fasting  Please drink two glasses of water morning of bloodwork  (1) COMPREHENSIVE METABOLIC PANEL 88YBA6464 31:02FN Oscar Santoyo    Order Number: [id number]     Test Name Result Flag Reference   GLUCOSE,RANDM 122 mg/dL     If the patient is fasting, the ADA then defines impaired fasting glucose as > 100 mg/dL and diabetes as > or equal to 123 mg/dL  SODIUM 138 mmol/L  136-145   POTASSIUM 4 3 mmol/L  3 5-5 3   CHLORIDE 105 mmol/L  100-108   CARBON DIOXIDE 25 mmol/L  21-32   ANION GAP (CALC) 8 mmol/L  4-13   BLOOD UREA NITROGEN 6 mg/dL  5-25   CREATININE 0 97 mg/dL  0 60-1 30   Standardized to IDMS reference method   CALCIUM 9 7 mg/dL  8 3-10 1   BILI, TOTAL 0 58 mg/dL  0 20-1 00   ALK PHOSPHATAS 87 U/L     ALT (SGPT) 296 U/L H 12-78   AST(SGOT) 227 U/L H 5-45   ALBUMIN 3 9 g/dL  3 5-5 0   TOTAL PROTEIN 8 2 g/dL  6 4-8 2   eGFR Non-African American      >60 0 ml/min/1 73sq [facility] Disease Education Program recommendations are as follows:  GFR calculation is accurate only with a steady state creatinine  Chronic Kidney disease less than 60 ml/min/1 73 sq  meters  Kidney failure less than 15 ml/min/1 73 sq  meters       (1) C-REACTIVE PROTEIN 39KDF2208 08:17AM Oscar Santoyo    Order Number: [id number]     Test Name Result Flag Reference   C-REACT PROTEIN 9 9 mg/L H <3 0     (1) SED RATE 06TDC6353 08:17AM Oscar Santoyo    Order Number: [id number]     Test Name Result Flag Reference   SED RATE 16 mm/hour  0-20       Attending Note  Attending Note: I interviewed and examined the patient, I discussed the case with the Resident and reviewed the Resident's note, I supervised the Resident and I agree with the Resident management plan as it was presented to me  Level of Participation: I was present in clinic and examined the patient  Patient's History: Ms Tani Alvares has been relatively stable since her last evaluation  She denies any significant changes in her symptoms since her last visit  She does continue to have mild flares of her rheumatoid arthritis typically once or twice a month  She complains mostly of stiffness in her neck, upper back, shoulders, and hands  She reports that the pain is quite mild and well managed with nabumetone  She states that typically over a month she will utilize 4-5 tablets of nabumetone  She does report that her stiffness is worse in the morning and typically resolves within 10 minutes  She also reports some intermittent pain of the right hip, which is not severe  She has been utilizing a TENS unit for her neck and upper back pain with good relief  Key Parts of the Exam: On exam, there is no active synovitis  She does have mild tenderness to palpation of the cervical and upper thoracic spine, as well as the bilateral shoulders and bilateral ankles  Review of laboratory studies revealed an essentially normal CBC, CMP, and ESR  CRP was elevated and essentially unchanged from prior studies  Diagnosis and Plan: At this time, her rheumatoid arthritis does appear relatively well controlled with the use of Plaquenil, Enbrel, and infrequent use of nabumetone  Despite this, she does continue to have a persistently elevated CRP  I'm reluctant to make any changes to her medications at this time, given the fact that she is doing quite well  We will continue to monitor her CRP, and if it continues to elevate, we may consider discontinuing the Enbrel and trying a different biologic such as Orencia  For now, she will continue the Plaquenil, Enbrel, and nabumetone at their current doses   I would like to recheck a CBC, CMP, ESR, and CRP before her follow-up  I will reevaluate her in 3 months  She will call in the interim if there are any questions or concerns  I agree with the Resident's note        Future Appointments    Date/Time Provider Specialty Site   03/27/4199 49:19 PM Jamila Rodriguez LSW  [facility]   04/14/2017 08:20 AM Phoebe Sweeney DO Rheumatology [facility] [address] [facility]     Signatures   Electronically signed by : Ny Thomas DO; Jan 13 2017  5:31PM EST                       (Author)    Electronically signed by : Je Joe DO; Tal 15 2017  9:54PM EST                       (Author)

## 2018-01-22 VITALS
WEIGHT: 226.13 LBS | SYSTOLIC BLOOD PRESSURE: 118 MMHG | HEIGHT: 66 IN | BODY MASS INDEX: 36.34 KG/M2 | DIASTOLIC BLOOD PRESSURE: 84 MMHG | HEART RATE: 64 BPM

## 2018-01-22 VITALS
SYSTOLIC BLOOD PRESSURE: 112 MMHG | HEIGHT: 66 IN | WEIGHT: 195 LBS | BODY MASS INDEX: 31.34 KG/M2 | DIASTOLIC BLOOD PRESSURE: 80 MMHG | HEART RATE: 88 BPM

## 2018-01-22 VITALS — BODY MASS INDEX: 43.09 KG/M2 | SYSTOLIC BLOOD PRESSURE: 108 MMHG | DIASTOLIC BLOOD PRESSURE: 88 MMHG | WEIGHT: 267 LBS

## 2018-01-23 VITALS
SYSTOLIC BLOOD PRESSURE: 110 MMHG | HEIGHT: 66 IN | DIASTOLIC BLOOD PRESSURE: 70 MMHG | BODY MASS INDEX: 28.21 KG/M2 | WEIGHT: 175.5 LBS

## 2018-03-07 NOTE — PSYCH
Treatment Plan    Date of Initial Treatment Plan: 11/19/2015  Date of Current Treatment Plan: 3/11/16  Treatment Plan 5  Strengths/Personal Resources for Self Care: PROBLEM SOLVING, CREATIVE, INTELLIGENT, ATTENTION TO DETAIL,  RESEARCHING SKILLS  Diagnosis:   Axis I: MOOD DISORDER, NOS, ANXIETY DISORDER, NOS, PTSD   Axis II: NONE   Axis III: RA, MIGRAINES     Current Challenges/Problems/Needs: NEED TO IMPROVE COPING SKILLS, MOODINESS, REACTIVITY,  TRAUMATIC MEMORIES  Long Term Goals:   ELIMINATION OF PTSD SYMPTOMS   Target Date: 12/31/2016      IMPROVING PHYSICAL HEALTH   Target Date: 12/31/2016      INCREASE IN CONSISTENT USE OF COPING SKILLS/ GENERAL DECREASE IN DEPRESSION, ANXIETY, IRRITABILITY/ANGER   Target Date: 12/31/2016      Short Term Objectives:   Goal 1:   RESUME FOCUS ON TRAUMA SYMPTOMS AS NEEDED  JOURNAL DURING TIMES OF GRIEVING  Target Date: 7/11/2016      Goal 2:   JOIN A FITNESS CLUB  CONTINUE TO WORK WITH DOCTORS TO TREAT SYMPTOMS,  AND WORK TOWARDS LOOSING WEIGHT  Target Date: 7/11/16      Goal 3:   USE A RELAXATION/MEDIATION ON A DAILY BASIS  EVALUATE THE HEALTH OF THE RELATIONSHIP AND DETERMINE STATUS  Target Date: 7/11/16      GOAL 1: Modality: Individual 4 x per month Target Date: 7/11/37618545     1 x per month     GOAL 2: Modality: Individual 4 x per month Target Date: 7/11/16         GOAL 3: Modality: Individual 4 x per month Target Date: 7/11/2016         The first scheduled review date is 7/11/2016  The expected length of service is 12 months  Level of functioning at initial assessment: 60  The highest level of functioning in the past year was 67  The current level of functioning is 65  Patient Signature: _________________________________ Date/Time: ______________      Active Problems    1  Anxiety (300 00) (F41 9)   2  Benign neoplasm of pituitary gland (227 3) (D35 2)   3  Chondromalacia, patella (717 7) (M22 40)   4   Depression (311) (F32 9)   5  Exertional dyspnea (786 09) (R06 09)   6  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)   7  High protein C activity level (790 92) (R79 1)   8  Hyperlipidemia (272 4) (E78 5)   9  Hyperprolactinemia (253 1) (E22 1)   10  Hypothyroidism (244 9) (E03 9)   11  Left foot pain (729 5) (M79 672)   12  Long-term use of hydroxychloroquine (V58 69) (Z79 899)   13  Major depressive disorder, recurrent, severe without psychotic features (296 33) (F33 2)   14  Migraine (346 90) (G43 909)   15  Migraine headache (346 90) (G43 909)   16  Mood disorder (296 90) (F39)   17  CLARK (nonalcoholic steatohepatitis) (571 8) (K75 81)   18  NSAID long-term use (V58 64) (Z79 1)   19  Obesity (278 00) (E66 9)   20  On etanercept therapy (V58 69) (Z79 899)   21  Pain, foot (729 5) (M79 673)   22  Pituitary microadenoma (227 3) (D35 2)   23  Polycystic ovarian syndrome (256 4) (E28 2)   24  Post traumatic stress disorder (PTSD) (309 81) (F43 10)   25  Prediabetes (790 29) (R73 09)   26  Rheumatoid arthritis of multiple sites with negative rheumatoid factor (714 0) (M06 09)   27  Sprain of ankle, left (845 00) (S93 402A)   28  TMJ Appearance   29  Trochanteric bursitis of right hip (726 5) (M70 61)   30  Vitamin D deficiency (268 9) (E55 9)    Signatures   Electronically signed by :  Benita Samuels [place]; Mar 16 5648  6:07PM EST                       (Author)

## 2018-03-07 NOTE — PSYCH
Treatment Plan    Date of Initial Treatment Plan: 11/19/2015  Date of Current Treatment Plan: 10/19/2016  Treatment Plan 7  Strengths/Personal Resources for Self Care: PROBLEM SOLVING, CREATIVE, INTELLIGENT, ATTENTION TO DETAIL,  RESEARCHING SKILLS  Diagnosis:   Axis I: MOOD DISORDER, NOS, ANXIETY DISORDER, NOS, PTSD   Axis II: NONE   Axis III: RA, MIGRAINES     Area of Needs: ANXIETY, HANDLING STRESS, PHYSICAL HEALTH AND PAIN ISSUES  INSOMNIA  Long Term Goals:   ELIMINATION OF PTSD SYMPTOMS   Target Date: 6/19/2017      IMPROVING PHYSICAL HEALTH   Target Date: 6/19/2017      INCREASE CONSISTENT USE OF COPING SKILLS/ GENERAL DECREASE IN DEPRESSION, ANXIETY, IRRITABILITY/ANGER   Target Date: 6/19/2017    Short Term Objectives:   Goal 1:   BEING ABLE TO USE PAST TRAUMA IN A POSITIVE WAY- TO HELP OTHERS, AND WORK THROUGH NEGATIVE ASPECTS AS THEY ARISE  Target Date: 2/19/2017      Goal 2:   CONTINUE TO WALK AT LEAST 3X/WEEK, AS WEATHER PERMITS  CONTINUE TO WORK WITH DOCTORS TO TREAT SYMPTOMS,  AND WORK TOWARDS LOOSING WEIGHT  Target Date: 2/19/2017      Goal 3:   CONTINUE RELAXATION/MEDIATION ON A DAILY BASIS  CONTINUE TO ENGAGE IN POSITIVE SOCIAL ACTIVITES AT LEAST 2X/WEEK  CONTINUE TO USE COPING SKILLS DURING TIMES OF DEPRESSION AND ANXIETY  Target Date: 2/19/2017      GOAL 1: Modality: Individual 1x- EVERY 6 WKS x per month Target Date: 2/19/2017     1 x per month     GOAL 2: Modality: Individual 1X/ 6 WKS x per month Target Date: 2/19/2017         GOAL 3: Modality: Individual 1X- EVERY 6 WKS x per month Target Date: 2/19/2017             The first scheduled review date is 2/19/2017  The expected length of service is 6 MONTHS  Patient Signature: _________________________________ Date/Time: ______________      Active Problems    1  Adalimumab (Humira) long-term use (V58 69) (Z79 899)   2  Anxiety (300 00) (F41 9)   3  Benign neoplasm of pituitary gland (227 3) (D35 2)   4  Chondromalacia, patella (717 7) (M22 40)   5  Controlled type 2 diabetes mellitus with diabetic nephropathy, without long-term current   use of insulin (250 40,583 81) (E11 21)   6  Depression (311) (F32 9)   7  Exertional dyspnea (786 09) (R06 09)   8  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)   9  High protein C activity level (790 92) (R79 1)   10  Hyperlipidemia (272 4) (E78 5)   11  Hyperprolactinemia (253 1) (E22 1)   12  Hypothyroidism (244 9) (E03 9)   13  Left foot pain (729 5) (M79 672)   14  Long-term use of hydroxychloroquine (V58 69) (Z79 899)   15  MDD (major depressive disorder), recurrent episode, severe (296 33) (F33 2)   16  Migraine (346 90) (G43 909)   17  Migraine, unspecified, not intractable, without status migrainosus (346 90) (G43 909)   18  Mood disorder (296 90) (F39)   19  CLARK (nonalcoholic steatohepatitis) (571 8) (K75 81)   20  Need for influenza vaccination (V04 81) (Z23)   21  NSAID long-term use (V58 64) (Z79 1)   22  Obesity (278 00) (E66 9)   23  Pain, foot (729 5) (M79 673)   24  Pain, joint, multiple sites (719 49) (M25 50)   25  Pituitary microadenoma (227 3) (D35 2)   26  Polycystic ovarian syndrome (256 4) (E28 2)   27  Post traumatic stress disorder (PTSD) (309 81) (F43 10)   28  Prediabetes (790 29) (R73 09)   29  Primary stabbing headache (339 85) (G44 85)   30  Rheumatoid arthritis of multiple sites with negative rheumatoid factor (714 0) (M06 09)   31  Vitamin D deficiency (268 9) (E55 9)    Signatures   Electronically signed by :  Debbora Dakin, [place]; Oct 19 8590  6:56PM EST                       (Author)

## 2018-03-07 NOTE — PSYCH
Treatment Plan    Date of Initial Treatment Plan: 11/19/2015  Date of Current Treatment Plan: 3/15/2017  Treatment Plan 8  Strengths/Personal Resources for Self Care: PROBLEM SOLVING, CREATIVE, INTELLIGENT, ATTENTION TO DETAIL,  RESEARCHING SKILLS  Diagnosis:   Axis I: MOOD DISORDER, NOS, ANXIETY DISORDER, NOS, PTSD   Axis II: NONE   Axis III: RA, MIGRAINES     Area of Needs: ANXIETY, HANDLING STRESS, PHYSICAL HEALTH AND PAIN ISSUES  INSOMNIA  Long Term Goals: Target Date: 9/19/2017      IMPROVING PHYSICAL HEALTH   Target Date: 9/19/2017      INCREASE CONSISTENT USE OF COPING SKILLS/ GENERAL DECREASE IN DEPRESSION, ANXIETY, IRRITABILITY/ANGER   Target Date: 9/19/2017    Short Term Objectives:   Goal 1:   CONTINUE TO BE ABLE TO USE PAST TRAUMA IN A POSITIVE WAY- TO HELP OTHERS, AND WORK THROUGH NEGATIVE ASPECTS AS THEY ARISE  Target Date: 6/19/2017      Goal 2:   CONTINUE TO WALK OR EXERCISE AT LEAST 3X/WEEK, AS WEATHER PERMITS  FOLLOW THROUGH WITH BARIATRIC SURGERY AND WORK TOWARDS WEIGHT GOAL  FOLLOW POST SURGERY REGIMENT  Target Date: 6/19/2017      Goal 3:   CONTINUE RELAXATION/MEDIATION ON A DAILY BASIS  CONTINUE TO USE COPING SKILLS DURING TIMES OF DEPRESSION AND ANXIETY  Target Date: 6/19/2017      GOAL 1: Modality: Individual 1x- EVERY 6 WKS x per month Target Date: 6/19/2017     1 x per month     GOAL 2: Modality: Individual 1X/ 6 WKS x per month Target Date: 6/19/2017         GOAL 3: Modality: Individual 1X- EVERY 6 WKS x per month Target Date: 6/19/2017             The first scheduled review date is 6/19/2017  The expected length of service is 6 MONTHS  Patient Signature: _________________________________ Date/Time: ______________      Active Problems    1  Anxiety (300 00) (F41 9)   2  Benign neoplasm of pituitary gland (227 3) (D35 2)   3  Chondromalacia, patella (717 7) (M22 40)   4   Controlled type 2 diabetes mellitus with diabetic nephropathy, without long-term current   use of insulin (250 40,583 81) (E11 21)   5  Depression (311) (F32 9)   6  Exertional dyspnea (786 09) (R06 09)   7  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)   8  High protein C activity level (790 92) (R79 1)   9  High risk medication use (V58 69) (Z79 899)   10  Hyperlipidemia (272 4) (E78 5)   11  Hyperprolactinemia (253 1) (E22 1)   12  Hypothyroidism (244 9) (E03 9)   13  Left foot pain (729 5) (M79 672)   14  Long-term use of hydroxychloroquine (V58 69) (Z79 899)   15  MDD (major depressive disorder), recurrent episode, severe (296 33) (F33 2)   16  Migraine (346 90) (G43 909)   17  Migraine, unspecified, not intractable, without status migrainosus (346 90) (G43 909)   18  Mood disorder (296 90) (F39)   19  CLARK (nonalcoholic steatohepatitis) (571 8) (K75 81)   20  Need for influenza vaccination (V04 81) (Z23)   21  NSAID long-term use (V58 64) (Z79 1)   22  Obesity (278 00) (E66 9)   23  On etanercept therapy (V58 69) (Z79 899)   24  Pain, foot (729 5) (M79 673)   25  Pain, joint, multiple sites (719 49) (M25 50)   26  Pituitary microadenoma (227 3) (D35 2)   27  Polycystic ovarian syndrome (256 4) (E28 2)   28  Post traumatic stress disorder (PTSD) (309 81) (F43 10)   29  Prediabetes (790 29) (R73 09)   30  Primary stabbing headache (339 85) (G44 85)   31  Rheumatoid arthritis of multiple sites with negative rheumatoid factor (714 0) (M06 09)   32  Vitamin D deficiency (268 9) (E55 9)    Signatures   Electronically signed by :  Liliana Williamson; Mar 15 5612 12:54PM EST                       (Author)

## 2018-03-07 NOTE — PSYCH
Treatment Plan    Date of Initial Treatment Plan: 11/19/2015  Date of Current Treatment Plan: 9/25/2017  Treatment Plan 8  Strengths/Personal Resources for Self Care: PROBLEM SOLVING, CREATIVE, INTELLIGENT, ATTENTION TO DETAIL,  RESEARCHING SKILLS  Diagnosis:   Axis I: DEPRESSIVE DISORDER, NOS, ANXIETY DISORDER, NOS, PTSD-RESOLVED   Axis II: NONE   Axis III: RA, MIGRAINES     Area of Needs: ANXIETY, HANDLING STRESS,   INSOMNIA  Long Term Goals: FINDING AN APPROPRIATE MATCH THAT BRINGS OUT THE BEST IN [facility] OTHER   Target Date: 9/19/2018      IMPROVING PHYSICAL HEALTH   Target Date: 9/19/2018      INCREASE CONSISTENT USE OF COPING SKILLS/ GENERAL DECREASE IN DEPRESSION, ANXIETY, IRRITABILITY/ANGER   Target Date: 9/19/2018    Short Term Objectives:   Goal 1:   CONTINUE TO BE ABLE TO USE PAST TRAUMA IN A POSITIVE WAY- TO HELP OTHERS, AND WORK THROUGH NEGATIVE ASPECTS AS THEY ARISE  Target Date: 1/25/2018      Goal 2:   CONTINUE TO WALK OR EXERCISE AT LEAST 4-5 X//WEEK, AS WEATHER PERMITS  CONTINUE TO BE ABLE TO LOOSE OR MAINTAIN CURRENT WEIGHT  Target Date: 1/25/2018      Goal 3:   RESUME RELAXATION/MEDIATION ON A DAILY BASIS  IDENTIFY AT LEAST 2 SOCIAL ACTIVITIES THAT WILL BE ENJOYABLE AND BE ABLE TO INTERACT W OTHERS IN A POSITIVE SETTING  Target Date: 1/25/2018      GOAL 1: Modality: Individual EVERY 2 MONTHS x per month Target Date: 1/25/2018     1 x per month     GOAL 2: Modality: Individual 2 MONTHS x per month Target Date: 1/25/2018         GOAL 3: Modality: Individual 2 MONTHS x per month Target Date: 1/25/2018             The first scheduled review date is 1/25/2018  The expected length of service is 12 MONTHS  Patient Signature: _________________________________ Date/Time: ______________      Active Problems    1  Anxiety (300 00) (F41 9)   2  Aspiration into airway, initial encounter (934 9) (T17 908A)   3  Benign neoplasm of pituitary gland (227 3) (D35 2)   4  Chondromalacia, patella (717 7) (M22 40)   5  Controlled type 2 diabetes mellitus with diabetic nephropathy, without long-term current   use of insulin (250 40,583 81) (E11 21)   6  Depression (311) (F32 9)   7  Exertional dyspnea (786 09) (R06 09)   8  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)   9  High protein C activity level (790 92) (R79 1)   10  High risk medication use (V58 69) (Z79 899)   11  Hyperlipidemia (272 4) (E78 5)   12  Hyperprolactinemia (253 1) (E22 1)   13  Hypothyroidism (244 9) (E03 9)   14  Left foot pain (729 5) (M79 672)   15  Long-term use of hydroxychloroquine (V58 69) (Z79 899)   16  Loss of weight (783 21) (R63 4)   17  MDD (major depressive disorder), recurrent episode, severe (296 33) (F33 2)   18  Migraine (346 90) (G43 909)   19  Migraine, unspecified, not intractable, without status migrainosus (346 90) (G43 909)   20  Mood disorder (296 90) (F39)   21  CLARK (nonalcoholic steatohepatitis) (571 8) (K75 81)   22  Need for influenza vaccination (V04 81) (Z23)   23  NSAID long-term use (V58 64) (Z79 1)   24  Obesity (278 00) (E66 9)   25  On etanercept therapy (V58 69) (Z79 899)   26  Pain, foot (729 5) (M79 673)   27  Pain, joint, multiple sites (719 49) (M25 50)   28  Pap smear for cervical cancer screening (V76 2) (Z12 4)   29  Pituitary microadenoma (227 3) (D35 2)   30  Polycystic ovarian syndrome (256 4) (E28 2)   31  Post traumatic stress disorder (PTSD) (309 81) (F43 10)   32  Prediabetes (790 29) (R73 09)   33  Primary stabbing headache (339 85) (G44 85)   34  Rheumatoid arthritis of multiple sites with negative rheumatoid factor (714 0) (M06 09)   35  Screening for depression (V79 0) (Z13 89)   36  Screening for diabetic retinopathy (V80 2) (Z13 5)   37  Sleep apnea (780 57) (G47 30)   38  Vitamin D deficiency (268 9) (E55 9)   39  Wellness examination (V70 0) (Z00 00)    Signatures   Electronically signed by :  Liliana Obregon; Sep 25 7316  7:03PM EST (Author)

## 2018-03-07 NOTE — PSYCH
Treatment Plan Tracking    #1 Treatment Plan not completed within required time limits due to: Other: [place] PLAN DUE ON 7/11/16- not completed due to lapse in appts until 7/20/2016  Signatures   Electronically signed by :  Liliana Prather; Nov 6 7790  7:36PM EST                       (Author)

## 2018-03-07 NOTE — PSYCH
Treatment Plan    Date of Initial Treatment Plan: 11/19/2015  Date of Current Treatment Plan: 7/20/2016  Treatment Plan 6  Strengths/Personal Resources for Self Care: PROBLEM SOLVING, CREATIVE, INTELLIGENT, ATTENTION TO DETAIL,  RESEARCHING SKILLS  Diagnosis:   Axis I: MOOD DISORDER, NOS, ANXIETY DISORDER, NOS, PTSD   Axis II: NONE   Axis III: RA, MIGRAINES     Area of Needs: ANXIETY, HANDLING STRESS, PHYSICAL HEALTH AND PAIN ISSUES  INSOMNIA  Long Term Goals:   ELIMINATION OF PTSD SYMPTOMS   Target Date: 12/31/2016      IMPROVING PHYSICAL HEALTH   Target Date: 12/31/2016      INCREASE CONSISTENT USE OF COPING SKILLS/ GENERAL DECREASE IN DEPRESSION, ANXIETY, IRRITABILITY/ANGER   Target Date: 12/31/2016    Short Term Objectives:   Goal 1:   BEING ABLE TO USE PAST TRAUMA IN A POSITIVE WAY- TO HELP OTHERS, AND WORK THROUGH NEGATIVE ASPECTS AS THEY ARISE  Target Date: 11/11/2016      Goal 2:   CONTINUE TO SWIM 2 HRS/DAY AS WEATHER PERMITS  WORKS TOWARDS WALKING AN HOURS/DAY  CONTINUE TO WORK WITH DOCTORS TO TREAT SYMPTOMS,  AND WORK TOWARDS LOOSING WEIGHT  Target Date: 11/11/2016      Goal 3:   CONTINUE RELAXATION/MEDIATION ON A DAILY BASIS  CAREFULLY EVALUATE POTENTIAL RELATIONSHIPS TO DETERMINE IF THEY MEET STANDARDS  CONTINUE TO USE COPING SKILLS DURING TIMES OF DEPRESSION AND ANXIETY  Target Date: 11/11/2016      GOAL 1: Modality: Individual 1-2 x per month Target Date: 11/11/2016     1 x per month     GOAL 2: Modality: Individual 1-2 x per month Target Date: 11/11/2016         GOAL 3: Modality: Individual 1-2 x per month Target Date: 11/11/2016             The first scheduled review date is 11/11/2016  The expected length of service is 12 months  The highest level of functioning in the past year was  The current level of functioning is  Patient Signature: _________________________________ Date/Time: ______________      Signatures   Electronically signed by :  Kim Guerrero Quinn Esparza; Jul 20 5121  5:51PM EST                       (Author)

## 2018-04-17 DIAGNOSIS — G43.109 MIGRAINE WITH AURA AND WITHOUT STATUS MIGRAINOSUS, NOT INTRACTABLE: Primary | ICD-10-CM

## 2018-04-17 RX ORDER — CEFUROXIME AXETIL 250 MG/1
TABLET ORAL
Qty: 3 CARTRIDGE | Refills: 1 | Status: SHIPPED | OUTPATIENT
Start: 2018-04-17 | End: 2018-05-01 | Stop reason: SDUPTHER

## 2018-04-17 RX ORDER — SUMATRIPTAN 6 MG/.5ML
INJECTION, SOLUTION SUBCUTANEOUS
COMMUNITY
Start: 2016-09-01 | End: 2018-04-17 | Stop reason: SDUPTHER

## 2018-04-17 RX ORDER — CEFUROXIME AXETIL 250 MG/1
TABLET ORAL
COMMUNITY
Start: 2017-01-11 | End: 2018-04-17 | Stop reason: SDUPTHER

## 2018-05-01 ENCOUNTER — OFFICE VISIT (OUTPATIENT)
Dept: FAMILY MEDICINE CLINIC | Facility: CLINIC | Age: 33
End: 2018-05-01
Payer: COMMERCIAL

## 2018-05-01 VITALS
SYSTOLIC BLOOD PRESSURE: 112 MMHG | BODY MASS INDEX: 27.59 KG/M2 | DIASTOLIC BLOOD PRESSURE: 72 MMHG | HEIGHT: 65 IN | WEIGHT: 165.6 LBS

## 2018-05-01 DIAGNOSIS — G43.109 MIGRAINE WITH AURA AND WITHOUT STATUS MIGRAINOSUS, NOT INTRACTABLE: ICD-10-CM

## 2018-05-01 DIAGNOSIS — F41.9 ANXIETY: Primary | ICD-10-CM

## 2018-05-01 DIAGNOSIS — F33.9 RECURRENT MAJOR DEPRESSIVE DISORDER, REMISSION STATUS UNSPECIFIED (HCC): ICD-10-CM

## 2018-05-01 PROBLEM — G47.30 SLEEP APNEA: Status: ACTIVE | Noted: 2017-07-12

## 2018-05-01 PROBLEM — G47.30 SLEEP APNEA: Status: RESOLVED | Noted: 2017-07-12 | Resolved: 2018-05-01

## 2018-05-01 PROBLEM — Z79.620 ADALIMUMAB (HUMIRA) LONG-TERM USE: Status: RESOLVED | Noted: 2018-05-01 | Resolved: 2018-05-01

## 2018-05-01 PROBLEM — R63.8 INCREASED BMI: Status: RESOLVED | Noted: 2017-12-13 | Resolved: 2018-05-01

## 2018-05-01 PROBLEM — M70.61 TROCHANTERIC BURSITIS OF RIGHT HIP: Status: RESOLVED | Noted: 2018-05-01 | Resolved: 2018-05-01

## 2018-05-01 PROBLEM — I10 HYPERTENSION: Status: RESOLVED | Noted: 2018-05-01 | Resolved: 2018-05-01

## 2018-05-01 PROBLEM — S03.00XA TMJ (DISLOCATION OF TEMPOROMANDIBULAR JOINT): Status: RESOLVED | Noted: 2018-05-01 | Resolved: 2018-05-01

## 2018-05-01 PROBLEM — Z79.899 ADALIMUMAB (HUMIRA) LONG-TERM USE: Status: RESOLVED | Noted: 2018-05-01 | Resolved: 2018-05-01

## 2018-05-01 PROBLEM — R79.89 ABNORMAL LFTS (LIVER FUNCTION TESTS): Status: RESOLVED | Noted: 2018-05-01 | Resolved: 2018-05-01

## 2018-05-01 PROBLEM — R63.8 INCREASED BMI: Status: ACTIVE | Noted: 2017-12-13

## 2018-05-01 PROBLEM — R79.82 ELEVATED C-REACTIVE PROTEIN: Status: RESOLVED | Noted: 2018-05-01 | Resolved: 2018-05-01

## 2018-05-01 PROBLEM — D35.2 PITUITARY MICROADENOMA (HCC): Status: ACTIVE | Noted: 2018-05-01

## 2018-05-01 PROCEDURE — 99213 OFFICE O/P EST LOW 20 MIN: CPT | Performed by: PHYSICIAN ASSISTANT

## 2018-05-01 RX ORDER — VERAPAMIL HYDROCHLORIDE 40 MG/1
0.5 TABLET ORAL DAILY
COMMUNITY
Start: 2017-04-17 | End: 2018-05-01

## 2018-05-01 RX ORDER — CLONAZEPAM 1 MG/1
TABLET ORAL
COMMUNITY
End: 2018-05-01

## 2018-05-01 RX ORDER — LANOLIN ALCOHOL/MO/W.PET/CERES
1 CREAM (GRAM) TOPICAL DAILY
COMMUNITY
End: 2018-05-01

## 2018-05-01 RX ORDER — CEFUROXIME AXETIL 250 MG/1
TABLET ORAL
Qty: 3 CARTRIDGE | Refills: 0 | Status: SHIPPED | OUTPATIENT
Start: 2018-05-01 | End: 2018-12-24 | Stop reason: SDUPTHER

## 2018-05-01 RX ORDER — TIZANIDINE 4 MG/1
1 TABLET ORAL 3 TIMES DAILY PRN
COMMUNITY
Start: 2017-01-13 | End: 2020-03-15

## 2018-05-01 RX ORDER — QUETIAPINE FUMARATE 50 MG/1
TABLET, FILM COATED ORAL
COMMUNITY
Start: 2016-03-25 | End: 2018-05-01

## 2018-05-01 RX ORDER — ROSUVASTATIN CALCIUM 10 MG/1
1 TABLET, COATED ORAL DAILY
COMMUNITY
Start: 2016-10-25 | End: 2018-05-01

## 2018-05-01 RX ORDER — QUETIAPINE FUMARATE 50 MG/1
50 TABLET, FILM COATED ORAL
Qty: 90 TABLET | Refills: 0 | Status: SHIPPED | OUTPATIENT
Start: 2018-05-01 | End: 2018-09-17 | Stop reason: SDUPTHER

## 2018-05-01 RX ORDER — CLONAZEPAM 0.5 MG/1
1 TABLET ORAL
COMMUNITY
End: 2018-05-01 | Stop reason: SDUPTHER

## 2018-05-01 RX ORDER — CLONAZEPAM 0.5 MG/1
0.5 TABLET ORAL DAILY PRN
Qty: 90 TABLET | Refills: 0 | Status: SHIPPED | OUTPATIENT
Start: 2018-05-01 | End: 2018-08-10 | Stop reason: SDUPTHER

## 2018-05-01 NOTE — PROGRESS NOTES
Assessment/Plan:         Diagnoses and all orders for this visit:    Anxiety  -     QUEtiapine (SEROquel) 50 mg tablet; Take 1 tablet (50 mg total) by mouth daily at bedtime  -     clonazePAM (KLONOPIN) 0 5 mg tablet; Take 1 tablet (0 5 mg total) by mouth daily as needed for seizures    Recurrent major depressive disorder, remission status unspecified (HCC)  -     QUEtiapine (SEROquel) 50 mg tablet; Take 1 tablet (50 mg total) by mouth daily at bedtime  -     clonazePAM (KLONOPIN) 0 5 mg tablet; Take 1 tablet (0 5 mg total) by mouth daily as needed for seizures    Other orders  -     etanercept (ENBREL SURECLICK) 50 MG/ML injection; Inject under the skin  -     Discontinue: melatonin 3 mg; Take 1 tablet by mouth daily  -     Discontinue: rosuvastatin (CRESTOR) 10 MG tablet; Take 1 tablet by mouth daily  -     Discontinue: clonazePAM (KlonoPIN) 1 mg tablet; Take by mouth  -     Discontinue: clonazePAM (KLONOPIN) 0 5 mg tablet; Take 1 tablet by mouth  -     Discontinue: QUEtiapine (SEROquel) 50 mg tablet; Take by mouth  -     tiZANidine (ZANAFLEX) 4 mg tablet; Take 1 tablet by mouth 3 (three) times a day as needed  -     Discontinue: verapamil (CALAN) 40 mg tablet; Take 0 5 tablets by mouth daily          Subjective:      Patient ID: Jeniffer Elizondo is a 34 y o  female  Domnick Joss is here today to follow up for anxiety and depression  She is stable on her medications  She is S/P gastric bypass surgery and continues to lose weight, she's lost 10 lbs since her last OV in 12/2017  Many of her medical conditions have resolved with her significant weight loss and she and I have updated her problem list at today's OV  The following portions of the patient's history were reviewed and updated as appropriate:   She  has a past medical history of Abnormal LFTs (liver function tests); Adalimumab (Humira) long-term use; Affective disorder ([facility] [address] ); Anxiety; Aspiration into airway;  Benign neoplasm of pituitary gland ([facility] [address] ); Chondromalacia, patella; Common migraine without aura; Controlled type 2 diabetes mellitus with diabetic nephropathy, without long-term current use of insulin ([facility] ); Depression with anxiety; Diabetes mellitus ([facility] ); Elevated C-reactive protein; Exertional dyspnea; Hemicrania continua; High protein C activity level; Hyperprolactinemia ([facility] ); Hypertension; Long-term use of hydroxychloroquine; Migraine; Polyarthritis; Polycystic ovarian disease; Prediabetes; Rheumatoid arthritis ([facility] ); Seronegative rheumatoid arthritis ([place] [address] ); TMJ (dislocation of temporomandibular joint); Trochanteric bursitis of right hip; and Vitamin D deficiency  She   Patient Active Problem List    Diagnosis Date Noted    Pituitary microadenoma ([place] [address] ) 05/01/2018    Controlled type 2 diabetes mellitus with diabetic nephropathy, without long-term current use of insulin ([place] [address] ) 10/12/2016    Rheumatoid arthritis of multiple sites with negative rheumatoid factor ([place] [address] ) 11/17/2015    Anxiety 07/20/2015    Migraine, unspecified, not intractable, without status migrainosus 04/24/2015    Hyperprolactinemia ([place] [address] ) 11/07/2014    Depression 05/17/2012     She  has a past surgical history that includes [place] tooth extraction; Tonsillectomy; Nasal septum surgery; [place] tooth extraction; Nasal septum surgery; Tonsillectomy; Dental surgery; Gastrectomy; and Tooth extraction  Her family history includes Arthritis in her mother; Cancer in her paternal grandfather; Colon cancer in her family; Coronary artery disease in her family; Diabetes in her family and paternal grandmother; Lung disease in her maternal grandfather; No Known Problems in her father; Psoriasis in her mother  She  reports that she has never smoked  She has never used smokeless tobacco  She reports that she does not drink alcohol or use drugs    Current Outpatient Prescriptions   Medication Sig Dispense Refill    clonazePAM (KLONOPIN) 0 5 mg tablet Take 1 tablet (0 5 mg total) by mouth daily as needed for seizures 90 tablet 0    etanercept (ENBREL SURECLICK) 50 MG/ML injection Inject under the skin      levonorgestrel-ethinyl estradiol (AVIANE,ALESSE,LESSINA) 0 1-20 MG-MCG per tablet Take 1 tablet by mouth daily   QUEtiapine (SEROquel) 50 mg tablet Take 1 tablet (50 mg total) by mouth daily at bedtime 90 tablet 0    SUMAtriptan Succinate 6 MG/0 5ML SOAJ Inject 0 5mL at onset of headache and may repeat in 2 hours, MAX 2 inject a day MAX 3 days/week 3 Cartridge 0    tiZANidine (ZANAFLEX) 4 mg tablet Take 1 tablet by mouth 3 (three) times a day as needed       No current facility-administered medications for this visit  Current Outpatient Prescriptions on File Prior to Visit   Medication Sig    levonorgestrel-ethinyl estradiol (AVIANE,ALESSE,LESSINA) 0 1-20 MG-MCG per tablet Take 1 tablet by mouth daily   [DISCONTINUED] QUEtiapine (SEROquel) 50 mg tablet Take 50 mg by mouth daily at bedtime   [DISCONTINUED] clonazePAM (KLONOPIN) 1 mg tablet Take 1 mg by mouth daily at bedtime   [DISCONTINUED] famotidine (PEPCID) 20 mg tablet Take 1 tablet by mouth 2 (two) times a day for 30 days    [DISCONTINUED] Hydroxychloroquine Sulfate (PLAQUENIL PO) Take 150 mg by mouth 2 (two) times a day   [DISCONTINUED] nabumetone (RELAFEN) 750 mg tablet Take 750 mg by mouth 2 (two) times a day as needed for mild pain   [DISCONTINUED] ondansetron (ZOFRAN) 4 mg tablet Take 4 mg by mouth every 8 (eight) hours as needed for nausea or vomiting   [DISCONTINUED] rizatriptan (MAXALT) 10 MG tablet Take 10 mg by mouth once as needed for migraine  May repeat in 2 hours if needed    [DISCONTINUED] spironolactone (ALDACTONE) 100 mg tablet Take 100 mg by mouth daily   [DISCONTINUED] SUMAtriptan Succinate 6 MG/0 5ML SOAJ 1 tablet at onset of migraine headache may repeat the dose 2 hours later if headache still present maximum dose  2 tablets per 24 hour      [DISCONTINUED] TiZANidine (ZANAFLEX) 4 MG capsule Take 4 mg by mouth 3 (three) times a day    [DISCONTINUED] UNABLE TO FIND Med Name: Embrel injection weekly    [DISCONTINUED] verapamil (CALAN) 40 mg tablet Take 40 mg by mouth 2 (two) times a day  No current facility-administered medications on file prior to visit  She is allergic to augmentin [amoxicillin-pot clavulanate]; nsaids; and pollen extract       Review of Systems   Constitutional: Negative for activity change, appetite change, chills, fatigue and fever  HENT: Negative for congestion, postnasal drip, rhinorrhea, sinus pressure and sore throat  Respiratory: Negative for shortness of breath and wheezing  Cardiovascular: Negative for chest pain and palpitations  Gastrointestinal: Negative for abdominal pain, constipation, diarrhea and nausea  Genitourinary: Negative for decreased urine volume, difficulty urinating, dysuria and urgency  Skin: Negative for rash  Neurological: Negative for dizziness, light-headedness and headaches  Psychiatric/Behavioral: Negative for agitation, confusion, decreased concentration, dysphoric mood, hallucinations, self-injury, sleep disturbance and suicidal ideas  The patient is not nervous/anxious and is not hyperactive  Objective:      /72 (BP Location: Left arm, Patient Position: Sitting)   Ht 5' 5" (1 651 m)   Wt 75 1 kg (165 lb 9 6 oz)   BMI 27 56 kg/m²          Physical Exam   Constitutional: She is oriented to person, place, and time  She appears well-developed  No distress  HENT:   Head: Normocephalic and atraumatic  Right Ear: External ear normal    Left Ear: External ear normal    Mouth/Throat: Oropharynx is clear and moist    Eyes: Conjunctivae are normal  Right eye exhibits no discharge  Left eye exhibits no discharge  No scleral icterus  Neck: Neck supple  No thyromegaly present  Cardiovascular: Normal rate, regular rhythm and normal heart sounds      Pulmonary/Chest: Effort normal and breath sounds normal  No respiratory distress  She has no wheezes  Musculoskeletal: Normal range of motion  She exhibits no edema  Lymphadenopathy:     She has no cervical adenopathy  Neurological: She is alert and oriented to person, place, and time  Skin: Skin is warm and dry  No rash noted  She is not diaphoretic  No erythema  Psychiatric: She has a normal mood and affect  Her behavior is normal  Judgment and thought content normal    Vitals reviewed

## 2018-05-15 ENCOUNTER — DOCUMENTATION (OUTPATIENT)
Dept: FAMILY MEDICINE CLINIC | Facility: CLINIC | Age: 33
End: 2018-05-15

## 2018-05-15 NOTE — PROGRESS NOTES
Patient ID: Suzie Rust is a 34 y o  female  Outpatient Discharge Summary: Lopez Barboza was referred by this therapist from her private practice to [place] [address] in November 2014  Lopez Barboza had made a lot of progress at this point in coping with the loss of her , Janet Mascorro, in 2010 ( ? ), which led her to a significant depression  Between November 2014 and September 2017, Lopez Barboza was working on issues related to healthy boundaries in relationships and in improving her cesia, which had been deteriorating due to RA, which took over a year to be properly diagnosed with  In the Spring of 2017, [place] decided to undergo gastric bypass surgery, which was very successful  At the time of her last session, she had lost over 50 lbs and many of her RA symptoms had s subsided and Lopez Barboza was feeling much more hopeful about her life  Frannie's symptoms of depression and anxiety had subsided for the most part and she was handling problems in her life effectively  Lopez Barboza and this therapist agreed that coming in every 2-3 months for maintenance was appropriate due to her sustained stability  [place] was not able to be  continue to seen at [place] [address] due to problems with her insurance being accepted  Therefore, [place] was re-referred back to this therapist in her Private Practice for maintenance sessions , where her insurance was able to be accepted  Admission Date: 11/10/2014  Steven Mendes was referred by Alexandra Garduno LCSW  Discharge Date: 9/25/2017    Discharge Diagnosis:  Depressive Disorder, NOS, in remission  No diagnosis found  Treating Physician: Dr Real Mello  Treatment Complications: None  Presenting Problem: Grief, issues with boundaires in relationships  Course of treatment includes:    individual therapy   Treatment Progress: excellent  Criteria for Discharge: change in Insurance- not accepted    Aftercare recommendations include Continue with maintenance sessions every 2-3 month- see therapist in Private Practice  Discharge Medications include:  Current Outpatient Prescriptions:     clonazePAM (KLONOPIN) 0 5 mg tablet, Take 1 tablet (0 5 mg total) by mouth daily as needed for seizures, Disp: 90 tablet, Rfl: 0    etanercept (ENBREL SURECLICK) 50 MG/ML injection, Inject under the skin, Disp: , Rfl:     levonorgestrel-ethinyl estradiol (AVIANE,ALESSE,LESSINA) 0 1-20 MG-MCG per tablet, Take 1 tablet by mouth daily  , Disp: , Rfl:     QUEtiapine (SEROquel) 50 mg tablet, Take 1 tablet (50 mg total) by mouth daily at bedtime, Disp: 90 tablet, Rfl: 0    SUMAtriptan Succinate 6 MG/0 5ML SOAJ, Inject 0 5mL at onset of headache and may repeat in 2 hours, MAX 2 inject a day MAX 3 days/week, Disp: 3 Cartridge, Rfl: 0    tiZANidine (ZANAFLEX) 4 mg tablet, Take 1 tablet by mouth 3 (three) times a day as needed, Disp: , Rfl:     Prognosis: excellent

## 2018-08-10 ENCOUNTER — OFFICE VISIT (OUTPATIENT)
Dept: FAMILY MEDICINE CLINIC | Facility: CLINIC | Age: 33
End: 2018-08-10
Payer: COMMERCIAL

## 2018-08-10 VITALS
SYSTOLIC BLOOD PRESSURE: 126 MMHG | WEIGHT: 162.4 LBS | HEIGHT: 65 IN | DIASTOLIC BLOOD PRESSURE: 70 MMHG | BODY MASS INDEX: 27.06 KG/M2

## 2018-08-10 DIAGNOSIS — D35.2 PITUITARY MICROADENOMA (HCC): Primary | ICD-10-CM

## 2018-08-10 DIAGNOSIS — F41.9 ANXIETY: ICD-10-CM

## 2018-08-10 DIAGNOSIS — F33.9 RECURRENT MAJOR DEPRESSIVE DISORDER, REMISSION STATUS UNSPECIFIED (HCC): ICD-10-CM

## 2018-08-10 PROCEDURE — 99213 OFFICE O/P EST LOW 20 MIN: CPT | Performed by: FAMILY MEDICINE

## 2018-08-10 PROCEDURE — 1036F TOBACCO NON-USER: CPT | Performed by: FAMILY MEDICINE

## 2018-08-10 PROCEDURE — 3008F BODY MASS INDEX DOCD: CPT | Performed by: FAMILY MEDICINE

## 2018-08-10 RX ORDER — CLONAZEPAM 0.5 MG/1
0.5 TABLET ORAL DAILY PRN
Qty: 90 TABLET | Refills: 0 | Status: SHIPPED | OUTPATIENT
Start: 2018-08-10 | End: 2019-01-28 | Stop reason: SDUPTHER

## 2018-08-10 NOTE — PROGRESS NOTES
Assessment/Plan:    No problem-specific Assessment & Plan notes found for this encounter  There are no diagnoses linked to this encounter  Subjective:      Patient ID: Mariusz Rueda is a 34 y o  female  Manual Phy is here today needs a refill on her Klonopin on is on doing pretty well her migraine headaches have really team to down on her even though her job is very stressful on she has lost a tremendous amount await looks like a different person on she had her arthralgias are doing quite well so from a standpoint of how she feels things are getting much better on she has not followed up with Neurosurgery regarding her pituitary microadenoma and she has asked that we check a prolactin level which we will do        The following portions of the patient's history were reviewed and updated as appropriate:   She  has a past medical history of Abnormal LFTs (liver function tests); Adalimumab (Humira) long-term use; Affective disorder ([address] ); Anxiety; Aspiration into airway; Benign neoplasm of pituitary gland ([address] ); Chondromalacia, patella; Common migraine without aura; Controlled type 2 diabetes mellitus with diabetic nephropathy, without long-term current use of insulin ([address] ); Depression with anxiety; Diabetes mellitus ([address] ); Elevated C-reactive protein; Exertional dyspnea; Hemicrania continua; High protein C activity level; Hyperprolactinemia ([address] ); Hypertension; Long-term use of hydroxychloroquine; Migraine; Polyarthritis; Polycystic ovarian disease; Prediabetes; Rheumatoid arthritis ([address] ); Seronegative rheumatoid arthritis ([address] ); TMJ (dislocation of temporomandibular joint); Trochanteric bursitis of right hip; and Vitamin D deficiency    She   Patient Active Problem List    Diagnosis Date Noted    Pituitary microadenoma ([address] ) 05/01/2018    Controlled type 2 diabetes mellitus with diabetic nephropathy, without long-term current use of insulin ([address] ) 10/12/2016    Rheumatoid arthritis of multiple sites with negative rheumatoid factor ([facility] ) 11/17/2015    Anxiety 07/20/2015    Migraine, unspecified, not intractable, without status migrainosus 04/24/2015    Hyperprolactinemia ([facility] ) 11/07/2014    Depression 05/17/2012     She  has a past surgical history that includes [place] tooth extraction; Tonsillectomy; Nasal septum surgery; [place] tooth extraction; Nasal septum surgery; Tonsillectomy; Dental surgery; Gastrectomy; and Tooth extraction  Her family history includes Arthritis in her mother; Cancer in her paternal grandfather; Colon cancer in her family; Coronary artery disease in her family; Diabetes in her family and paternal grandmother; Lung disease in her maternal grandfather; No Known Problems in her father; Psoriasis in her mother  She  reports that she has never smoked  She has never used smokeless tobacco  She reports that she does not drink alcohol or use drugs  Current Outpatient Prescriptions   Medication Sig Dispense Refill    clonazePAM (KLONOPIN) 0 5 mg tablet Take 1 tablet (0 5 mg total) by mouth daily as needed for seizures 90 tablet 0    etanercept (ENBREL SURECLICK) 50 MG/ML injection Inject under the skin      levonorgestrel-ethinyl estradiol (AVIANE,ALESSE,LESSINA) 0 1-20 MG-MCG per tablet Take 1 tablet by mouth daily   QUEtiapine (SEROquel) 50 mg tablet Take 1 tablet (50 mg total) by mouth daily at bedtime 90 tablet 0    SUMAtriptan Succinate 6 MG/0 5ML SOAJ Inject 0 5mL at onset of headache and may repeat in 2 hours, MAX 2 inject a day MAX 3 days/week 3 Cartridge 0    tiZANidine (ZANAFLEX) 4 mg tablet Take 1 tablet by mouth 3 (three) times a day as needed       No current facility-administered medications for this visit        Current Outpatient Prescriptions on File Prior to Visit   Medication Sig    clonazePAM (KLONOPIN) 0 5 mg tablet Take 1 tablet (0 5 mg total) by mouth daily as needed for seizures    etanercept (ENBREL SURECLICK) 50 MG/ML injection Inject under the skin    levonorgestrel-ethinyl estradiol (AVIANE,ALESSE,LESSINA) 0 1-20 MG-MCG per tablet Take 1 tablet by mouth daily   QUEtiapine (SEROquel) 50 mg tablet Take 1 tablet (50 mg total) by mouth daily at bedtime    SUMAtriptan Succinate 6 MG/0 5ML SOAJ Inject 0 5mL at onset of headache and may repeat in 2 hours, MAX 2 inject a day MAX 3 days/week    tiZANidine (ZANAFLEX) 4 mg tablet Take 1 tablet by mouth 3 (three) times a day as needed     No current facility-administered medications on file prior to visit  She is allergic to augmentin [amoxicillin-pot clavulanate]; nsaids; and pollen extract       Review of Systems   Constitutional: Negative for activity change, appetite change, diaphoresis, fatigue and fever  HENT: Negative  Eyes: Negative  Respiratory: Negative for apnea, cough, chest tightness, shortness of breath and wheezing  Cardiovascular: Negative for chest pain, palpitations and leg swelling  Gastrointestinal: Negative for abdominal distention, abdominal pain, anal bleeding, constipation, diarrhea, nausea and vomiting  Endocrine: Negative for cold intolerance, heat intolerance, polydipsia, polyphagia and polyuria  History of pituitary adenoma   Genitourinary: Negative for difficulty urinating, dysuria, flank pain, hematuria and urgency  Musculoskeletal: Negative for arthralgias, back pain, gait problem, joint swelling and myalgias  Skin: Negative for color change, rash and wound  Allergic/Immunologic: Negative for environmental allergies, food allergies and immunocompromised state  Neurological: Negative for dizziness, seizures, syncope, speech difficulty, numbness and headaches  Hematological: Negative for adenopathy  Does not bruise/bleed easily  Psychiatric/Behavioral: Negative for agitation, behavioral problems, hallucinations, sleep disturbance and suicidal ideas           Objective:      /70   Ht 5' 5" (1 651 m)   Wt 73 7 kg (162 lb 6 4 oz)   BMI 27 02 kg/m² Physical Exam   Constitutional: She is oriented to person, place, and time  She appears well-developed and well-nourished  No distress  HENT:   Head: Normocephalic  Right Ear: External ear normal    Left Ear: External ear normal    Nose: Nose normal    Mouth/Throat: Oropharynx is clear and moist    Eyes: Conjunctivae and EOM are normal  Pupils are equal, round, and reactive to light  Right eye exhibits no discharge  Left eye exhibits no discharge  No scleral icterus  Neck: Normal range of motion  No tracheal deviation present  No thyromegaly present  Cardiovascular: Normal rate, regular rhythm and normal heart sounds  Exam reveals no gallop and no friction rub  No murmur heard  Pulmonary/Chest: Effort normal and breath sounds normal  No respiratory distress  She has no wheezes  Abdominal: Soft  Bowel sounds are normal  She exhibits no mass  There is no tenderness  There is no guarding  Musculoskeletal: She exhibits no edema or deformity  Lymphadenopathy:     She has no cervical adenopathy  Neurological: She is alert and oriented to person, place, and time  No cranial nerve deficit  Skin: Skin is warm and dry  No rash noted  She is not diaphoretic  No erythema  Psychiatric: She has a normal mood and affect   Thought content normal

## 2018-09-04 LAB — HBA1C MFR BLD HPLC: 5.1 %

## 2018-09-14 ENCOUNTER — TELEPHONE (OUTPATIENT)
Dept: FAMILY MEDICINE CLINIC | Facility: CLINIC | Age: 33
End: 2018-09-14

## 2018-09-14 DIAGNOSIS — D51.9 ANEMIA DUE TO VITAMIN B12 DEFICIENCY, UNSPECIFIED B12 DEFICIENCY TYPE: ICD-10-CM

## 2018-09-14 DIAGNOSIS — Z98.84 S/P BARIATRIC SURGERY: Primary | ICD-10-CM

## 2018-09-14 RX ORDER — CYANOCOBALAMIN 1000 UG/ML
1000 INJECTION INTRAMUSCULAR; SUBCUTANEOUS ONCE
Qty: 1 ML | Refills: 2 | Status: SHIPPED | OUTPATIENT
Start: 2018-09-14 | End: 2019-01-28 | Stop reason: SDUPTHER

## 2018-09-14 NOTE — TELEPHONE ENCOUNTER
Called Pt with message - L/M requesting return call to let us know where she would like Rx sent   WT-Smiley

## 2018-09-14 NOTE — TELEPHONE ENCOUNTER
Vit B level very low at 202 discovered through labs - 9/4/18    Pt is currently on Sublingual 2500 & it doesn't seem to be covering it    ASKING PCP TO ORDER & ADMINISTER B12 inj FOR PT

## 2018-09-14 NOTE — TELEPHONE ENCOUNTER
Patient will need to get the B12 from the pharmacy we can send a prescription for unit dose B12 it will be happy to administer it we can't get B12 for our office from our supplier

## 2018-09-17 DIAGNOSIS — F41.9 ANXIETY: ICD-10-CM

## 2018-09-17 DIAGNOSIS — F33.9 RECURRENT MAJOR DEPRESSIVE DISORDER, REMISSION STATUS UNSPECIFIED (HCC): ICD-10-CM

## 2018-09-17 RX ORDER — QUETIAPINE FUMARATE 50 MG/1
50 TABLET, FILM COATED ORAL
Qty: 90 TABLET | Refills: 1 | Status: SHIPPED | OUTPATIENT
Start: 2018-09-17 | End: 2019-01-28 | Stop reason: SDUPTHER

## 2018-09-17 NOTE — PROGRESS NOTES
Call patient to notify normal results chemistry profile is normal including calcium level vitamin-D level is just a little bit below norms I would recommend 2000 units of D3 daily

## 2018-09-17 NOTE — PROGRESS NOTES
Call patient to notify normal results patient's vitamin B1 and vitamin a levels are right now middle of normal

## 2018-10-08 ENCOUNTER — TELEPHONE (OUTPATIENT)
Dept: FAMILY MEDICINE CLINIC | Facility: CLINIC | Age: 33
End: 2018-10-08

## 2018-10-08 NOTE — TELEPHONE ENCOUNTER
So if she is on a dose of 0 5 mg once daily I would begin breaking the tablets in half to take a 0 25 mg dose daily for 1 week then 0 25 mg daily only prn severe anxiety for 2 weeks then stop

## 2018-10-08 NOTE — TELEPHONE ENCOUNTER
Patient asking how to start reducing her klonopin due to in the next month she wants to start trying to get pregnant  Please advise

## 2018-12-24 DIAGNOSIS — Z98.84 H/O BARIATRIC SURGERY: Primary | ICD-10-CM

## 2018-12-24 DIAGNOSIS — G43.109 MIGRAINE WITH AURA AND WITHOUT STATUS MIGRAINOSUS, NOT INTRACTABLE: ICD-10-CM

## 2018-12-24 RX ORDER — CEFUROXIME AXETIL 250 MG/1
TABLET ORAL
Qty: 9 CARTRIDGE | Refills: 2 | Status: SHIPPED | OUTPATIENT
Start: 2018-12-24 | End: 2020-01-14 | Stop reason: SDUPTHER

## 2018-12-24 RX ORDER — CEFUROXIME AXETIL 250 MG/1
TABLET ORAL
Qty: 9 CARTRIDGE | Refills: 0 | Status: SHIPPED | OUTPATIENT
Start: 2018-12-24 | End: 2018-12-24 | Stop reason: SDUPTHER

## 2019-01-28 ENCOUNTER — OFFICE VISIT (OUTPATIENT)
Dept: FAMILY MEDICINE CLINIC | Facility: CLINIC | Age: 34
End: 2019-01-28
Payer: COMMERCIAL

## 2019-01-28 VITALS
HEIGHT: 65 IN | WEIGHT: 174.6 LBS | SYSTOLIC BLOOD PRESSURE: 116 MMHG | DIASTOLIC BLOOD PRESSURE: 62 MMHG | BODY MASS INDEX: 29.09 KG/M2

## 2019-01-28 DIAGNOSIS — Z98.84 S/P BARIATRIC SURGERY: ICD-10-CM

## 2019-01-28 DIAGNOSIS — F41.9 ANXIETY: ICD-10-CM

## 2019-01-28 DIAGNOSIS — F33.9 RECURRENT MAJOR DEPRESSIVE DISORDER, REMISSION STATUS UNSPECIFIED (HCC): ICD-10-CM

## 2019-01-28 DIAGNOSIS — D51.9 ANEMIA DUE TO VITAMIN B12 DEFICIENCY, UNSPECIFIED B12 DEFICIENCY TYPE: ICD-10-CM

## 2019-01-28 DIAGNOSIS — E22.1 HYPERPROLACTINEMIA (HCC): ICD-10-CM

## 2019-01-28 DIAGNOSIS — Z00.00 ANNUAL PHYSICAL EXAM: Primary | ICD-10-CM

## 2019-01-28 DIAGNOSIS — E66.3 OVERWEIGHT (BMI 25.0-29.9): ICD-10-CM

## 2019-01-28 PROCEDURE — 99395 PREV VISIT EST AGE 18-39: CPT | Performed by: PHYSICIAN ASSISTANT

## 2019-01-28 RX ORDER — QUETIAPINE FUMARATE 50 MG/1
50 TABLET, FILM COATED ORAL
Qty: 90 TABLET | Refills: 1 | Status: SHIPPED | OUTPATIENT
Start: 2019-01-28 | End: 2020-03-15

## 2019-01-28 RX ORDER — CLONAZEPAM 0.5 MG/1
0.5 TABLET ORAL DAILY PRN
Qty: 30 TABLET | Refills: 0 | Status: SHIPPED | OUTPATIENT
Start: 2019-01-28 | End: 2019-12-06 | Stop reason: ALTCHOICE

## 2019-01-28 RX ORDER — CYANOCOBALAMIN 1000 UG/ML
1000 INJECTION INTRAMUSCULAR; SUBCUTANEOUS
Qty: 1 ML | Refills: 2 | Status: SHIPPED | OUTPATIENT
Start: 2019-01-28 | End: 2019-06-11 | Stop reason: SDUPTHER

## 2019-01-28 NOTE — PATIENT INSTRUCTIONS

## 2019-01-28 NOTE — PROGRESS NOTES
ADULT ANNUAL PHYSICAL  [id number]    NAME: Cyndi Dang Leader  AGE: 28 y o  SEX: female  : 1985     DATE: 2019     Assessment and Plan:     Problem List Items Addressed This Visit        Endocrine    Hyperprolactinemia ([address] )       Other    Anxiety    Relevant Medications    clonazePAM (KLONOPIN) 0 5 mg tablet    QUEtiapine (SEROquel) 50 mg tablet    Depression    Relevant Medications    clonazePAM (KLONOPIN) 0 5 mg tablet    QUEtiapine (SEROquel) 50 mg tablet      Other Visit Diagnoses     Annual physical exam    -  Primary    Relevant Orders    Lipid panel    HEMOGLOBIN A1C W/ EAG ESTIMATION    Comprehensive metabolic panel    Overweight (BMI 25 0-29  9)        S/P bariatric surgery        Relevant Medications    cyanocobalamin 1,000 mcg/mL    Anemia due to vitamin B12 deficiency, unspecified B12 deficiency type        Relevant Medications    cyanocobalamin 1,000 mcg/mL        Health maintenance and preventative care screenings were discussed with patient today  Appropriate education was printed on patient's after visit summary  · Discussed risks/benefits of screening for cervical cancer, high cholesterol and diabetes  Patient agrees to screening for high cholesterol and diabetes  · Immunizations were reviewed: patient is up-to-date with her immunizations  · Refilled medications and ordered labs  Queried patient using PMED  Counseling:  Dental Health: discussed importance of regular tooth brushing, flossing, and dental visits  · BMI Counseling: Body mass index is 29 05 kg/m²  Discussed the patient's BMI with her  The BMI is above average  BMI counseling and education was provided to the patient  Nutrition recommendations include reducing portion sizes, 3-5 servings of fruits/vegetables daily, reducing fast food intake and consuming healthier snacks   Exercise recommendations include moderate aerobic physical activity for 150 minutes/week and exercising 3-5 times per week  Return in about 1 year (around 1/28/2020) for Annual physical      Chief Complaint:     Chief Complaint   Patient presents with    Annual Exam     Wellness visit needed for work      History of Present Illness:     Adult Annual Physical   Patient here for a comprehensive physical exam  The patient reports no problems  She is in the process of trying the ween off of her Klonopin  She states that she is only taking   5mg at bedtime  She admits that her moods have been stable  She is requesting a refill on her Seroquel and Vitamin B12  She is also requesting a lipid panel and A1C that she needs to be completed for her work physical      Diet and Physical Activity  · Diet/Nutrition: well balanced diet  · Weight concerns: patient is overweight (BMI 25 0-29 9)  · Exercise: moderate cardiovascular exercise and 3-4 times a week on average  Depression Screening  PHQ-9 Depression Screening    PHQ-9:    Frequency of the following problems over the past two weeks:            General Health  · Sleep: sleeps well  · Hearing: normal - bilateral   · Vision: no vision problems, goes for regular eye exams, most recent eye exam <1 year ago and wears contacts  · Dental: regular dental visits, brushes teeth twice daily and flosses teeth occasionally  /GYN Health  · Last menstrual period: 01/14/19  · Follows regularly with gynecologist, and is up to date with PAP Smears  Review of Systems:     Review of Systems   Constitutional: Negative for chills, diaphoresis, fatigue and fever  HENT: Negative for congestion, ear pain, postnasal drip, rhinorrhea, sneezing, sore throat and trouble swallowing  Eyes: Negative for pain and visual disturbance  Respiratory: Negative for apnea, cough, shortness of breath and wheezing  Cardiovascular: Negative for chest pain and palpitations  Gastrointestinal: Negative for abdominal pain, constipation, diarrhea, nausea and vomiting     Genitourinary: Negative for dysuria and hematuria  Musculoskeletal: Negative for arthralgias, gait problem and myalgias  Neurological: Negative for dizziness, syncope, weakness, light-headedness, numbness and headaches  Psychiatric/Behavioral: Negative for suicidal ideas  The patient is not nervous/anxious         Past Medical History:     Past Medical History:   Diagnosis Date    Abnormal LFTs (liver function tests)     LAST ASSESSED: [id number]    Adalimumab (Humira) long-term use     LAST ASSESSED: [id number]    Affective disorder ([facility])     LAST ASSESSED: [id number]    Anxiety     Aspiration into airway     LAST ASSESSED: [id number]    Benign neoplasm of pituitary gland ([facility])     LAST ASSESSED: [id number]    Chondromalacia, patella     LAST ASSESSED: [id number]    Common migraine without aura     LAST ASSESSED: [id number]    Controlled type 2 diabetes mellitus with diabetic nephropathy, without long-term current use of insulin ([facility])     LAST ASSESSED: [id number]    Depression with anxiety     Diabetes mellitus ([facility])     LAST ASSESSED: [id number]    Elevated C-reactive protein     LAST ASSESSED: [id number]    Exertional dyspnea     LAST ASSESSED: [id number]    Hemicrania continua     LAST ASSESSED: [id number]    High protein C activity level     LAST ASSESSED: [id number]    Hyperprolactinemia ([facility])     LAST ASSESSED: [id number]    Hypertension     Long-term use of hydroxychloroquine     LAST ASSESSED: [id number]    Migraine     LAST ASSESSED: [id number]    Polyarthritis     LAST ASSESSED: [id number]    Polycystic ovarian disease     Prediabetes     LAST ASSESSED: [id number]    Rheumatoid arthritis ([facility])     LAST ASSESSED: [id number]    Seronegative rheumatoid arthritis ([facility])     LAST ASSESSED: [id number]    TMJ (dislocation of temporomandibular joint)     APPEARANCE LAST ASSESSED: [id number]    Trochanteric bursitis of right hip     LAST ASSESSED: [id number]    Vitamin D deficiency     LAST ASSESSED: [id number] Past Surgical History:     Past Surgical History:   Procedure Laterality Date    DENTAL SURGERY      GASTRECTOMY      SLEEVE RESOLVED: [id number]    NASAL SEPTUM SURGERY      DEVIATION REPAIR    NASAL SEPTUM SURGERY      TONSILLECTOMY      TONSILLECTOMY      TOOTH EXTRACTION      WISDOM TOOTH EXTRACTION      WISDOM TOOTH EXTRACTION        Social History:     Social History     Social History    Marital status:      Spouse name: N/A    Number of children: N/A    Years of education: HS OR GED     Occupational History    CUSTOMER SERVICE      FULL-TIME EMPLOYMENT     Social History Main Topics    Smoking status: Never Smoker    Smokeless tobacco: Never Used    Alcohol use No      Comment: OCCASIONAL - 1 DRINK/MONTH AS PER ALLSCRIPTS    Drug use: No    Sexual activity: Yes     Other Topics Concern    None     Social History Narrative    CAFFEINE USE    PATIENT HAS LIVING WILL      Family History:     Family History   Problem Relation Age of Onset    Arthritis Mother     Psoriasis Mother     No Known Problems Father     Lung disease Maternal Grandfather         BLACK    Diabetes Paternal Grandmother     Cancer Paternal Grandfather     Coronary artery disease Family     Colon cancer Family     Diabetes Family       Current Medications:     Current Outpatient Prescriptions   Medication Sig Dispense Refill    clonazePAM (KLONOPIN) 0 5 mg tablet Take 1 tablet (0 5 mg total) by mouth daily as needed for anxiety 30 tablet 0    etanercept (ENBREL SURECLICK) 50 MG/ML injection Inject under the skin      levonorgestrel-ethinyl estradiol (AVIANE,ALESSE,LESSINA) 0 1-20 MG-MCG per tablet Take 1 tablet by mouth daily        QUEtiapine (SEROquel) 50 mg tablet Take 1 tablet (50 mg total) by mouth daily at bedtime 90 tablet 1    SUMAtriptan Succinate 6 MG/0 5ML SOAJ Inject 0 5mL at onset of headache and may repeat in 2 hours, MAX 2 inject a day MAX 3 days/week 9 Cartridge 2    tiZANidine (Branda Emma) 4 mg tablet Take 1 tablet by mouth 3 (three) times a day as needed      cyanocobalamin 1,000 mcg/mL Inject 1 mL (1,000 mcg total) into a muscle every 30 (thirty) days 1 mL 2     No current facility-administered medications for this visit  Allergies: Allergies   Allergen Reactions    Augmentin [Amoxicillin-Pot Clavulanate] GI Intolerance and Vomiting     Category: Adverse Reaction;     Nsaids      Other reaction(s): gastric sleeve    Pollen Extract      Other reaction(s): Other (Please comment)  Pt has post nasal drip,sneezing,eyes watery  Objective:     /62   Ht 5' 5" (1 651 m)   Wt 79 2 kg (174 lb 9 6 oz)   BMI 29 05 kg/m²     Physical Exam   Constitutional: She is oriented to person, place, and time  She appears well-developed and well-nourished  HENT:   Head: Normocephalic and atraumatic  Right Ear: External ear normal    Left Ear: External ear normal    Nose: Nose normal    Mouth/Throat: Oropharynx is clear and moist    Eyes: Pupils are equal, round, and reactive to light  EOM are normal    Neck: Normal range of motion  Neck supple  Cardiovascular: Normal rate and regular rhythm  Pulmonary/Chest: Effort normal and breath sounds normal    Abdominal: Soft  Bowel sounds are normal    Musculoskeletal: Normal range of motion  Neurological: She is alert and oriented to person, place, and time  Skin: Skin is warm and dry  Psychiatric: She has a normal mood and affect  Her behavior is normal  Judgment and thought content normal    Vitals reviewed         Health Maintenance:     Health Maintenance   Topic Date Due    Pneumococcal PPSV23 Medium Risk Adult (1 of 1 - PPSV23) 09/20/2003    URINE MICROALBUMIN  01/04/2018    INFLUENZA VACCINE  07/01/2018    HEMOGLOBIN A1C  03/04/2019    Diabetic Foot Exam  05/01/2019    DM Eye Exam  05/01/2019    DTaP,Tdap,and Td Vaccines (2 - Td) 12/19/2026     Immunization History   Administered Date(s) Administered     Influenza (IM) Preservative Free 1985    Influenza 11/05/2014, 02/12/2015, 11/28/2015, 10/12/2016, 11/01/2017    Influenza Quadrivalent Preservative Free 3 years and older IM 08/26/2014, 10/12/2016, 11/01/2017    Influenza TIV (IM) 11/12/2015, 09/27/2017    Pneumococcal Conjugate 13-Valent 02/02/2016    Td (adult), adsorbed 1984    Tdap 12/19/2016    Tuberculin Skin Test-PPD Intradermal 11/10/2017       Gaby Stephy, PA-C  [id number]

## 2019-01-30 ENCOUNTER — TELEPHONE (OUTPATIENT)
Dept: NEUROSURGERY | Facility: CLINIC | Age: 34
End: 2019-01-30

## 2019-01-30 DIAGNOSIS — D35.2 PITUITARY ADENOMA (HCC): Primary | ICD-10-CM

## 2019-01-30 NOTE — TELEPHONE ENCOUNTER
Pt last seen 2015, and was to return in 2yrs  She calls now to schedule studies and a f/u  Orders placed, and central schedule number provided to schedule study  F/U appt offered and accepted

## 2019-02-05 LAB — HBA1C MFR BLD HPLC: 4.9 %

## 2019-02-09 ENCOUNTER — HOSPITAL ENCOUNTER (OUTPATIENT)
Dept: MRI IMAGING | Facility: HOSPITAL | Age: 34
Discharge: HOME | End: 2019-02-09
Attending: NEUROLOGICAL SURGERY
Payer: COMMERCIAL

## 2019-02-09 DIAGNOSIS — D35.2 PITUITARY ADENOMA (HCC): ICD-10-CM

## 2019-02-09 PROCEDURE — A9585 GADOBUTROL INJECTION: HCPCS | Performed by: NEUROLOGICAL SURGERY

## 2019-02-09 PROCEDURE — 70553 MRI BRAIN STEM W/O & W/DYE: CPT

## 2019-02-09 RX ADMIN — GADOBUTROL 7 ML: 604.72 INJECTION INTRAVENOUS at 09:18

## 2019-02-26 ENCOUNTER — OFFICE VISIT (OUTPATIENT)
Dept: NEUROSURGERY | Facility: CLINIC | Age: 34
End: 2019-02-26
Payer: COMMERCIAL

## 2019-02-26 VITALS
HEIGHT: 65 IN | HEART RATE: 74 BPM | WEIGHT: 170 LBS | SYSTOLIC BLOOD PRESSURE: 105 MMHG | DIASTOLIC BLOOD PRESSURE: 78 MMHG | BODY MASS INDEX: 28.32 KG/M2 | TEMPERATURE: 98 F

## 2019-02-26 DIAGNOSIS — D35.2 PITUITARY MICROADENOMA (HCC): Primary | ICD-10-CM

## 2019-02-26 PROCEDURE — 99203 OFFICE O/P NEW LOW 30 MIN: CPT | Performed by: PHYSICIAN ASSISTANT

## 2019-02-26 NOTE — LETTER
February 27, 2019     Branden Pérez DO  [address]    Patient: Celeste Mckeon   YOB: 1985   Date of Visit: 2/26/2019       Dear Dr Alistair Gardiner: Thank you for referring Rhonda Fernandomonique to me for evaluation  Below are my notes for this consultation  If you have questions, please do not hesitate to call me  I look forward to following your patient along with you  Sincerely,        Estrada Rodríguez MD        CC: No Recipients  Neli Felix PA-C  2/27/2019  5:13 AM  Incomplete  Assessment/Plan:       Diagnoses and all orders for this visit:    Pituitary microadenoma ([facility] [address] )  -     MRI brain pituitary wo and w contrast; Future  -     Growth hormone; Future  -     Calcium, ionized; Future  -     Insulin-like growth factor 1 (IGF-1); Future  -     TSH, 3rd generation; Future  -     T4, free; Future  -     ACTH; Future  -     Cortisol Level, AM Specimen; Future  -     Alpha Subunit, Free; Future  -     Prolactin; Future  -     Follicle stimulating hormone; Future  -     Luteinizing hormone; Future  -     Testosterone, free, total; Future  -     Basic metabolic panel; Future            Discussion/ Summary: This is a 28 y o  female who presents for follow up for pituitary microadenoma  The MRI Brain/Pituitary (2/9/19) was reviewed in detail by Dr Concetta Zhou and demonstrates stable pituitary microadenoma compared to prior MRI Brain/Pituitary (11/14/15)  Also reviewed previous MRI Brain/Pituitary imaging ( 4/23/15;  11/13/14 )  The 2/3/19 labs (acth, cortisol, free T4, FSH, BMP, LH, prolactin, TSH, IGF 2, testosterone free total, alpha subunit) were reviewed and unremarkable  Neurosurgical intervention is not advised at this juncture  Continued surveillance of pituitary microadenoma is advise with follow up in 5 years with MRI brain/pituitary w/wo contrast and endo panel labs       Dr Concetta Zhou discussed with Ms Lina Walden that there does not appear to be hormonal explanation for what she describes as "brain fog"  Dr Regina Gilbert discussed what she describes as "brain fog" may be associated flairs with RA  Patient reports she is actively trying to get pregnant  It was explained to patient that pregnancy can sometimes resultant excitation / enlargement of a pituitary adenoma  Patient advised to contact our office or present to Emergency Room if experience new / worsening headache, vision change, mental status change,  sensory / motor change, or other neurological change  These findings and recommendations were discussed in detail with patient  Patient expressed understanding and agreement      _________________________________________________________________________________      Subjective:      Patient ID: Yesica Avery is a 28 y o  female who presents for follow up with MRI Brain and labs for Pituitary Microadenoma  HPI  She reports intermittent headaches approximately 2 times every 6 months  She reports headaches can be associated if she feels dehydrated  Hydration is alleviating factor  Imitrex injection prn can also alleviate headache  She denies headache currently  She reports history of bariatric surgery in March 2017  She reports she has lost 125 lbs since her bariatric surgery  She reports history of forgetfulness or which she describes as "brain fog" with RA  She reports history of rheumatoid arthritis x3 years (seronegative RA)  She reports she has follow up with Rheumatology in March 2019  She reports she is currently off Enbrel and off oral contraceptive medication  She denies menstrual irregularity  She reports menstrual cycle once monthly  She reports she is actively trying to get pregnant  She denies nausea or vomiting  She denies seizure  She denies fatigue or decreased libido  She denies diplopia  She denies visual field deficit  She denies galactorrhea  She ambulates independent  No focal weakness         The following portions of the patient's history were reviewed and updated as appropriate: allergies, current medications, past family history, past medical history, past social history and past surgical history  Review of Systems   Constitutional: Negative for fever  HENT: Negative  Eyes: Negative for visual disturbance  Respiratory: Negative for shortness of breath  Cardiovascular: Negative for chest pain  Gastrointestinal: Negative for nausea and vomiting  Genitourinary: Negative for difficulty urinating  Musculoskeletal: Negative for gait problem  Neurological:        See HPI  Psychiatric/Behavioral: Negative for agitation  Objective:      /78 (BP Location: Left arm, Patient Position: Sitting, Cuff Size: Standard)   Pulse 74   Temp 98 °F (36 7 °C) (Temporal)   Ht 5' 5" (1 651 m)   Wt 77 1 kg (170 lb)   BMI 28 29 kg/m²           Physical Exam   Constitutional: She is oriented to person, place, and time  She appears well-developed and well-nourished  No distress  Eyes: Pupils are equal, round, and reactive to light  EOM are normal  No scleral icterus  Cardiovascular: Normal rate and regular rhythm  No murmur heard  Pulmonary/Chest: Effort normal  No respiratory distress  Neurological: She is alert and oriented to person, place, and time  She has a normal Finger-Nose-Finger Test and a normal Tandem Gait Test  Gait normal    Skin: Skin is warm and dry  Psychiatric: She has a normal mood and affect  Her speech is normal        Neurologic Exam     Mental Status   Oriented to person, place, and time  Speech: speech is normal   Level of consciousness: alert      Alert, oriented x3, thought content appropriate  GCS15  Briskly follows commands  Id's pen, the point, and function to write correct  Id's colors x 3 correct  Names [place] cities [place] correct  Counts fingers correct  Shows correct number fingers both hands    Names [place] presidents x 5 in descending order correct        Cranial Nerves     CN II   Visual fields full to confrontation  CN III, IV, VI   Pupils are equal, round, and reactive to light  Extraocular motions are normal      CN V   Facial sensation intact  CN VII   Facial expression full, symmetric  CN VIII   Hearing: intact    CN IX, X   Palate: symmetric    CN XI   Right sternocleidomastoid strength: normal  Left sternocleidomastoid strength: normal  Right trapezius strength: normal  Left trapezius strength: normal    CN XII   Tongue: not atrophic  Tongue deviation: none    Motor Exam   Right arm pronator drift: absent  Left arm pronator drift: absent    Motor:  Shoulder abduction 5/5 bilaterally  Elbow flexion/extension 5/5 bilaterally  Wrist flexion/extension 5/5 bilaterally  Finger  / abduction 5/5 bilaterally  Hip flexion/abduction/adduction 5/5 bilaterally  Knee flexion/extension 5/5 bilaterally  Ankle DF/PF 5/5 bilaterally  Sensory Exam   Light touch normal      Gait, Coordination, and Reflexes     Gait  Gait: normal (Independent )    Coordination   Finger to nose coordination: normal  Tandem walking coordination: normal    Tremor   Resting tremor: absent  Intention tremor: absent  Action tremor: absent  AUGUSTINE fingers intact bilaterally  Imaging study:    2/9/19 [facility] MRI Brain / Pituitary w/wo contrast -- per report: " MRI BRAIN AND SELLA  WITH AND WITHOUT CONTRAST     INDICATION: Follow-up of pituitary microadenoma  No acute symptomatology      COMPARISON: MRI of the brain from November 14, 2015      TECHNIQUE:  Brain: Axial diffusion-weighted imaging  Axial FLAIR and axial T2  Axial gradient  Axial T1 postcontrast Axial bravo postcontrast   Sella: Sagittal and coronal T1  Coronal T2  Sagittal and coronal T1 postcontrast with fat suppression    Coronal dynamic enhancement      Targeted images of the sella were performed requiring additional time at acquisition and interpretation of approximately 25%     IV Contrast:  7 mL of Gadobutrol injection (SINGLE-DOSE)      IMAGE QUALITY:  Diagnostic      FINDINGS:     BRAIN PARENCHYMA:  There is no discrete mass, mass effect or midline shift  No abnormal white matter signal identified  Brainstem and cerebellum demonstrate normal signal  There is no intracranial hemorrhage  There is no evidence of acute infarction and   diffusion imaging is unremarkable  Postcontrast imaging is normal      VENTRICLES:  Normal      SELLA AND PITUITARY GLAND:  Redemonstrated is the slight asymmetric enlargement of the right pituitary gland secondary to an underlying 8 x 4 mm region of differential enhancement consistent with the previously reported microadenoma  This is best   appreciated on image 9 of series 13  The optic chiasm, infundibulum and cavernous sinus regions are unremarkable      ORBITS:  Normal      PARANASAL SINUSES:  Normal      VASCULATURE:  Evaluation of the major intracranial vasculature demonstrates appropriate flow voids      CALVARIUM AND SKULL BASE:  Normal      EXTRACRANIAL SOFT TISSUES:  Normal      IMPRESSION:     Stable pituitary microadenoma  Otherwise unremarkable MRI of the brain      Workstation performed: [id number] "Glenys Demarco PA-C  2/26/2019  9:33 AM  Sign at close encounter  Assessment/Plan:       Diagnoses and all orders for this visit:    Pituitary microadenoma ([facility] [address] )  -     MRI brain pituitary wo and w contrast; Future  -     Growth hormone; Future  -     Calcium, ionized; Future  -     Insulin-like growth factor 1 (IGF-1); Future  -     TSH, 3rd generation; Future  -     T4, free; Future  -     ACTH; Future  -     Cortisol Level, AM Specimen; Future  -     Alpha Subunit, Free; Future  -     Prolactin; Future  -     Follicle stimulating hormone; Future  -     Luteinizing hormone; Future  -     Testosterone, free, total; Future  -     Basic metabolic panel; Future            Discussion/ Summary:   This is a 28 y o  female who presents for follow up for pituitary microadenoma  The MRI Brain/Pituitary (2/9/19) was reviewed in detail by Dr Manuel Cisneros and demonstrates *** compared to prior MRI Brain/Pituitary (11/14/15)  Also reviewed previous MRI Brain/Pituitary imaging ( 4/23/15;  11/13/14 )  The 2/3/19 labs were reviewed and are ***    Patient is advised ***    Patient is to follow up ***   Patient is to have *** completed shortly prior to follow up visit  Patient advised to contact our office or present to Emergency Room if experience new / worsening headache, vision change, mental status change,  sensory / motor change, or other neurological change  These findings and recommendations were discussed in detail with patient  Patient expressed understanding and agreement      _________________________________________________________________________________      Subjective:      Patient ID: Zuly Zaidi is a 28 y o  female who presents for follow up with MRI Brain and labs for Pituitary Microadenoma  HPI  She reports intermittent headaches approximately 2 times every 6 months  She reports headaches can be associated if she feels dehydrated  Hydration is alleviating factor  Imitrex injection prn can also alleviate headache  She denies headache currently  She reports history of bariatric surgery in March 2017  She reports she has lost 125 lbs since her bariatric surgery  She reports history of forgetfulness or which she describes as "brain fog" with RA  She reports history of rheumatoid arthritis x3 years (seronegative RA)  She reports she has follow up with Rheumatology in March 2019  She reports she is currently off Enbrel and off oral contraceptive medication  She denies menstrual irregularity  She reports menstrual cycle once monthly  She reports she is actively trying to get pregnant  She denies nausea or vomiting  She denies seizure  She denies fatigue or decreased libido  She denies diplopia    She denies visual field deficit  She denies galactorrhea  She ambulates independent  No focal weakness  The following portions of the patient's history were reviewed and updated as appropriate: allergies, current medications, past family history, past medical history, past social history and past surgical history  Review of Systems   Constitutional: Negative for fever  HENT: Negative  Eyes: Negative for visual disturbance  Respiratory: Negative for shortness of breath  Cardiovascular: Negative for chest pain  Gastrointestinal: Negative for nausea and vomiting  Genitourinary: Negative for difficulty urinating  Musculoskeletal: Negative for gait problem  Neurological:        See HPI  Psychiatric/Behavioral: Negative for agitation  Objective:      /78 (BP Location: Left arm, Patient Position: Sitting, Cuff Size: Standard)   Pulse 74   Temp 98 °F (36 7 °C) (Temporal)   Ht 5' 5" (1 651 m)   Wt 77 1 kg (170 lb)   BMI 28 29 kg/m²           Physical Exam   Constitutional: She is oriented to person, place, and time  She appears well-developed and well-nourished  No distress  Eyes: Pupils are equal, round, and reactive to light  EOM are normal  No scleral icterus  Cardiovascular: Normal rate and regular rhythm  No murmur heard  Pulmonary/Chest: Effort normal  No respiratory distress  Neurological: She is alert and oriented to person, place, and time  She has a normal Finger-Nose-Finger Test and a normal Tandem Gait Test  Gait normal    Skin: Skin is warm and dry  Psychiatric: She has a normal mood and affect  Her speech is normal        Neurologic Exam     Mental Status   Oriented to person, place, and time  Speech: speech is normal   Level of consciousness: alert      Alert, oriented x3, thought content appropriate  GCS15  Briskly follows commands  Id's pen, the point, and function to write correct  Id's colors x 3 correct  Names 3 cities in [place] correct  Counts fingers correct  Shows correct number fingers both hands  Names [place] presidents x 5 in descending order correct        Cranial Nerves     CN II   Visual fields full to confrontation  CN III, IV, VI   Pupils are equal, round, and reactive to light  Extraocular motions are normal      CN V   Facial sensation intact  CN VII   Facial expression full, symmetric  CN VIII   Hearing: intact    CN IX, X   Palate: symmetric    CN XI   Right sternocleidomastoid strength: normal  Left sternocleidomastoid strength: normal  Right trapezius strength: normal  Left trapezius strength: normal    CN XII   Tongue: not atrophic  Tongue deviation: none    Motor Exam   Right arm pronator drift: absent  Left arm pronator drift: absent    Motor:  Shoulder abduction 5/5 bilaterally  Elbow flexion/extension 5/5 bilaterally  Wrist flexion/extension 5/5 bilaterally  Finger  / abduction 5/5 bilaterally  Hip flexion/abduction/adduction 5/5 bilaterally  Knee flexion/extension 5/5 bilaterally  Ankle DF/PF 5/5 bilaterally  Sensory Exam   Light touch normal      Gait, Coordination, and Reflexes     Gait  Gait: normal (Independent )    Coordination   Finger to nose coordination: normal  Tandem walking coordination: normal    Tremor   Resting tremor: absent  Intention tremor: absent  Action tremor: absent  AUGUSTINE fingers intact bilaterally  Imaging study:    2/9/19 [facility]) MRI Brain / Pituitary w/wo contrast -- per report: " MRI BRAIN AND SELLA  WITH AND WITHOUT CONTRAST     INDICATION: Follow-up of pituitary microadenoma  No acute symptomatology      COMPARISON: MRI of the brain from November 14, 2015      TECHNIQUE:  Brain: Axial diffusion-weighted imaging  Axial FLAIR and axial T2  Axial gradient  Axial T1 postcontrast Axial bravo postcontrast   Sella: Sagittal and coronal T1  Coronal T2  Sagittal and coronal T1 postcontrast with fat suppression    Coronal dynamic enhancement      Targeted images of the sella were performed requiring additional time at acquisition and interpretation of approximately 25%     IV Contrast:  7 mL of Gadobutrol injection (SINGLE-DOSE)      IMAGE QUALITY:  Diagnostic      FINDINGS:     BRAIN PARENCHYMA:  There is no discrete mass, mass effect or midline shift  No abnormal white matter signal identified  Brainstem and cerebellum demonstrate normal signal  There is no intracranial hemorrhage  There is no evidence of acute infarction and   diffusion imaging is unremarkable  Postcontrast imaging is normal      VENTRICLES:  Normal      SELLA AND PITUITARY GLAND:  Redemonstrated is the slight asymmetric enlargement of the right pituitary gland secondary to an underlying 8 x 4 mm region of differential enhancement consistent with the previously reported microadenoma  This is best   appreciated on image 9 of series 13  The optic chiasm, infundibulum and cavernous sinus regions are unremarkable      ORBITS:  Normal      PARANASAL SINUSES:  Normal      VASCULATURE:  Evaluation of the major intracranial vasculature demonstrates appropriate flow voids      CALVARIUM AND SKULL BASE:  Normal      EXTRACRANIAL SOFT TISSUES:  Normal      IMPRESSION:     Stable pituitary microadenoma    Otherwise unremarkable MRI of the brain      Workstation performed: [id number] "

## 2019-02-26 NOTE — PATIENT INSTRUCTIONS
Have labs completed about 1 week prior to MRI Brain/Pituitary in 5 years  (Have completed around 8am)  Contact our office or present to Emergency Room if experience new / worsening headache, vision change, mental status change,  sensory / motor change, or other neurological change

## 2019-02-26 NOTE — LETTER
February 27, 2019     Amalia Taylor DO  [address]    Patient: Sloan Chamorro   YOB: 1985   Date of Visit: 2/26/2019       Dear Dr Garrett Waits[place]: Thank you for referring Phil Blount to me for evaluation  Below are my notes for this consultation  If you have questions, please do not hesitate to call me  I look forward to following your patient along with you  Sincerely,        Bria Richardson MD        CC: No Recipients  Allene Nageotte, PA-C  2/27/2019  5:14 AM  Sign at close encounter  Assessment/Plan:       Diagnoses and all orders for this visit:    Pituitary microadenoma ([facility] [address] )  -     MRI brain pituitary wo and w contrast; Future  -     Growth hormone; Future  -     Calcium, ionized; Future  -     Insulin-like growth factor 1 (IGF-1); Future  -     TSH, 3rd generation; Future  -     T4, free; Future  -     ACTH; Future  -     Cortisol Level, AM Specimen; Future  -     Alpha Subunit, Free; Future  -     Prolactin; Future  -     Follicle stimulating hormone; Future  -     Luteinizing hormone; Future  -     Testosterone, free, total; Future  -     Basic metabolic panel; Future            Discussion/ Summary: This is a 28 y o  female who presents for follow up for pituitary microadenoma  The MRI Brain/Pituitary (2/9/19) was reviewed in detail by Dr Manuela Jin and demonstrates stable pituitary microadenoma compared to prior MRI Brain/Pituitary (11/14/15)  Also reviewed previous MRI Brain/Pituitary imaging ( 4/23/15;  11/13/14 )  The 2/3/19 labs (acth, cortisol, free T4, FSH, BMP, LH, prolactin, TSH, IGF 2, testosterone free total, alpha subunit) were reviewed and unremarkable  Neurosurgical intervention is not advised at this juncture  Continued surveillance of pituitary microadenoma is advise with follow up in 5 years with MRI brain/pituitary w/wo contrast and endo panel labs       Dr Manuela Jin discussed with Ms Regino Hunter that there does not appear to be hormonal explanation for what she describes as "brain fog"  Dr Marilee Tomlinson discussed what she describes as "brain fog" may be associated flairs with RA  Patient reports she is actively trying to get pregnant  It was explained to patient that pregnancy can sometimes resultant excitation / enlargement of a pituitary adenoma  Patient advised to contact our office or present to Emergency Room if experience new / worsening headache, vision change, mental status change,  sensory / motor change, or other neurological change  These findings and recommendations were discussed in detail with patient  Patient expressed understanding and agreement      _________________________________________________________________________________      Subjective:      Patient ID: Lenon Callas is a 28 y o  female who presents for follow up with MRI Brain and labs for Pituitary Microadenoma  HPI  She reports intermittent headaches approximately 2 times every 6 months  She reports headaches can be associated if she feels dehydrated  Hydration is alleviating factor  Imitrex injection prn can also alleviate headache  She denies headache currently  She reports history of bariatric surgery in March 2017  She reports she has lost 125 lbs since her bariatric surgery  She reports history of forgetfulness or which she describes as "brain fog" with RA  She reports history of rheumatoid arthritis x3 years (seronegative RA)  She reports she has follow up with Rheumatology in March 2019  She reports she is currently off Enbrel and off oral contraceptive medication  She denies menstrual irregularity  She reports menstrual cycle once monthly  She reports she is actively trying to get pregnant  She denies nausea or vomiting  She denies seizure  She denies fatigue or decreased libido  She denies diplopia  She denies visual field deficit  She denies galactorrhea  She ambulates independent   No focal weakness  The following portions of the patient's history were reviewed and updated as appropriate: allergies, current medications, past family history, past medical history, past social history and past surgical history  Review of Systems   Constitutional: Negative for fever  HENT: Negative  Eyes: Negative for visual disturbance  Respiratory: Negative for shortness of breath  Cardiovascular: Negative for chest pain  Gastrointestinal: Negative for nausea and vomiting  Genitourinary: Negative for difficulty urinating  Musculoskeletal: Negative for gait problem  Neurological:        See HPI  Psychiatric/Behavioral: Negative for agitation  Objective:      /78 (BP Location: Left arm, Patient Position: Sitting, Cuff Size: Standard)   Pulse 74   Temp 98 °F (36 7 °C) (Temporal)   Ht 5' 5" (1 651 m)   Wt 77 1 kg (170 lb)   BMI 28 29 kg/m²           Physical Exam   Constitutional: She is oriented to person, place, and time  She appears well-developed and well-nourished  No distress  Eyes: Pupils are equal, round, and reactive to light  EOM are normal  No scleral icterus  Cardiovascular: Normal rate and regular rhythm  No murmur heard  Pulmonary/Chest: Effort normal  No respiratory distress  Neurological: She is alert and oriented to person, place, and time  She has a normal Finger-Nose-Finger Test and a normal Tandem Gait Test  Gait normal    Skin: Skin is warm and dry  Psychiatric: She has a normal mood and affect  Her speech is normal        Neurologic Exam     Mental Status   Oriented to person, place, and time  Speech: speech is normal   Level of consciousness: alert      Alert, oriented x3, thought content appropriate  GCS15  Briskly follows commands  Id's pen, the point, and function to write correct  Id's colors x 3 correct  Names [place] cities [place] correct  Counts fingers correct  Shows correct number fingers both hands    Names [place] presidents x 5 in descending order correct        Cranial Nerves     CN II   Visual fields full to confrontation  CN III, IV, VI   Pupils are equal, round, and reactive to light  Extraocular motions are normal      CN V   Facial sensation intact  CN VII   Facial expression full, symmetric  CN VIII   Hearing: intact    CN IX, X   Palate: symmetric    CN XI   Right sternocleidomastoid strength: normal  Left sternocleidomastoid strength: normal  Right trapezius strength: normal  Left trapezius strength: normal    CN XII   Tongue: not atrophic  Tongue deviation: none    Motor Exam   Right arm pronator drift: absent  Left arm pronator drift: absent    Motor:  Shoulder abduction 5/5 bilaterally  Elbow flexion/extension 5/5 bilaterally  Wrist flexion/extension 5/5 bilaterally  Finger  / abduction 5/5 bilaterally  Hip flexion/abduction/adduction 5/5 bilaterally  Knee flexion/extension 5/5 bilaterally  Ankle DF/PF 5/5 bilaterally  Sensory Exam   Light touch normal      Gait, Coordination, and Reflexes     Gait  Gait: normal (Independent )    Coordination   Finger to nose coordination: normal  Tandem walking coordination: normal    Tremor   Resting tremor: absent  Intention tremor: absent  Action tremor: absent  AUGUSTINE fingers intact bilaterally  Imaging study:    2/9/19 [facility] MRI Brain / Pituitary w/wo contrast -- per report: " MRI BRAIN AND SELLA  WITH AND WITHOUT CONTRAST     INDICATION: Follow-up of pituitary microadenoma  No acute symptomatology      COMPARISON: MRI of the brain from November 14, 2015      TECHNIQUE:  Brain: Axial diffusion-weighted imaging  Axial FLAIR and axial T2  Axial gradient  Axial T1 postcontrast Axial bravo postcontrast   Sella: Sagittal and coronal T1  Coronal T2  Sagittal and coronal T1 postcontrast with fat suppression    Coronal dynamic enhancement      Targeted images of the sella were performed requiring additional time at acquisition and interpretation of approximately 25%     IV Contrast:  7 mL of Gadobutrol injection (SINGLE-DOSE)      IMAGE QUALITY:  Diagnostic      FINDINGS:     BRAIN PARENCHYMA:  There is no discrete mass, mass effect or midline shift  No abnormal white matter signal identified  Brainstem and cerebellum demonstrate normal signal  There is no intracranial hemorrhage  There is no evidence of acute infarction and   diffusion imaging is unremarkable  Postcontrast imaging is normal      VENTRICLES:  Normal      SELLA AND PITUITARY GLAND:  Redemonstrated is the slight asymmetric enlargement of the right pituitary gland secondary to an underlying 8 x 4 mm region of differential enhancement consistent with the previously reported microadenoma  This is best   appreciated on image 9 of series 13  The optic chiasm, infundibulum and cavernous sinus regions are unremarkable      ORBITS:  Normal      PARANASAL SINUSES:  Normal      VASCULATURE:  Evaluation of the major intracranial vasculature demonstrates appropriate flow voids      CALVARIUM AND SKULL BASE:  Normal      EXTRACRANIAL SOFT TISSUES:  Normal      IMPRESSION:     Stable pituitary microadenoma    Otherwise unremarkable MRI of the brain      Workstation performed: [id number] "

## 2019-02-26 NOTE — PROGRESS NOTES
Assessment/Plan:       Diagnoses and all orders for this visit:    Pituitary microadenoma ([address] )  -     MRI brain pituitary wo and w contrast; Future  -     Growth hormone; Future  -     Calcium, ionized; Future  -     Insulin-like growth factor 1 (IGF-1); Future  -     TSH, 3rd generation; Future  -     T4, free; Future  -     ACTH; Future  -     Cortisol Level, AM Specimen; Future  -     Alpha Subunit, Free; Future  -     Prolactin; Future  -     Follicle stimulating hormone; Future  -     Luteinizing hormone; Future  -     Testosterone, free, total; Future  -     Basic metabolic panel; Future            Discussion/ Summary: This is a 28 y o  female who presents for follow up for pituitary microadenoma  The MRI Brain/Pituitary (2/9/19) was reviewed in detail by Dr Josephine Angelo and demonstrates stable pituitary microadenoma compared to prior MRI Brain/Pituitary (11/14/15)  Also reviewed previous MRI Brain/Pituitary imaging ( 4/23/15;  11/13/14 )  The 2/3/19 labs (acth, cortisol, free T4, FSH, BMP, LH, prolactin, TSH, IGF 2, testosterone free total, alpha subunit) were reviewed and unremarkable  Neurosurgical intervention is not advised at this juncture  Continued surveillance of pituitary microadenoma is advise with follow up in 5 years with MRI brain/pituitary w/wo contrast and endo panel labs  Dr Josephine Angelo discussed with Ms Tani Alvares that there does not appear to be hormonal explanation for what she describes as "brain fog"  Dr Josephine Angelo discussed what she describes as "brain fog" may be associated flairs with RA  Patient reports she is actively trying to get pregnant  It was explained to patient that pregnancy can sometimes resultant excitation / enlargement of a pituitary adenoma  Patient advised to contact our office or present to Emergency Room if experience new / worsening headache, vision change, mental status change,  sensory / motor change, or other neurological change        These findings and recommendations were discussed in detail with patient  Patient expressed understanding and agreement      _________________________________________________________________________________      Subjective:      Patient ID: Galdino Burch is a 28 y o  female who presents for follow up with MRI Brain and labs for Pituitary Microadenoma  HPI  She reports intermittent headaches approximately 2 times every 6 months  She reports headaches can be associated if she feels dehydrated  Hydration is alleviating factor  Imitrex injection prn can also alleviate headache  She denies headache currently  She reports history of bariatric surgery in March 2017  She reports she has lost 125 lbs since her bariatric surgery  She reports history of forgetfulness or which she describes as "brain fog" with RA  She reports history of rheumatoid arthritis x3 years (seronegative RA)  She reports she has follow up with Rheumatology in March 2019  She reports she is currently off Enbrel and off oral contraceptive medication  She denies menstrual irregularity  She reports menstrual cycle once monthly  She reports she is actively trying to get pregnant  She denies nausea or vomiting  She denies seizure  She denies fatigue or decreased libido  She denies diplopia  She denies visual field deficit  She denies galactorrhea  She ambulates independent  No focal weakness  The following portions of the patient's history were reviewed and updated as appropriate: allergies, current medications, past family history, past medical history, past social history and past surgical history  Review of Systems   Constitutional: Negative for fever  HENT: Negative  Eyes: Negative for visual disturbance  Respiratory: Negative for shortness of breath  Cardiovascular: Negative for chest pain  Gastrointestinal: Negative for nausea and vomiting  Genitourinary: Negative for difficulty urinating  Musculoskeletal: Negative for gait problem  Neurological:        See HPI  Psychiatric/Behavioral: Negative for agitation  Objective:      /78 (BP Location: Left arm, Patient Position: Sitting, Cuff Size: Standard)   Pulse 74   Temp 98 °F (36 7 °C) (Temporal)   Ht 5' 5" (1 651 m)   Wt 77 1 kg (170 lb)   BMI 28 29 kg/m²          Physical Exam   Constitutional: She is oriented to person, place, and time  She appears well-developed and well-nourished  No distress  Eyes: Pupils are equal, round, and reactive to light  EOM are normal  No scleral icterus  Cardiovascular: Normal rate and regular rhythm  No murmur heard  Pulmonary/Chest: Effort normal  No respiratory distress  Neurological: She is alert and oriented to person, place, and time  She has a normal Finger-Nose-Finger Test and a normal Tandem Gait Test  Gait normal    Skin: Skin is warm and dry  Psychiatric: She has a normal mood and affect  Her speech is normal        Neurologic Exam     Mental Status   Oriented to person, place, and time  Speech: speech is normal   Level of consciousness: alert      Alert, oriented x3, thought content appropriate  GCS15  Briskly follows commands  Id's pen, the point, and function to write correct  Id's colors x 3 correct  Names 3 cities in [place] correct  Counts fingers correct  Shows correct number fingers both hands  Names [place] presidents x 5 in descending order correct        Cranial Nerves     CN II   Visual fields full to confrontation  CN III, IV, VI   Pupils are equal, round, and reactive to light  Extraocular motions are normal      CN V   Facial sensation intact  CN VII   Facial expression full, symmetric       CN VIII   Hearing: intact    CN IX, X   Palate: symmetric    CN XI   Right sternocleidomastoid strength: normal  Left sternocleidomastoid strength: normal  Right trapezius strength: normal  Left trapezius strength: normal    CN XII   Tongue: not atrophic  Tongue deviation: none    Motor Exam   Right arm pronator drift: absent  Left arm pronator drift: absent    Motor:  Shoulder abduction 5/5 bilaterally  Elbow flexion/extension 5/5 bilaterally  Wrist flexion/extension 5/5 bilaterally  Finger  / abduction 5/5 bilaterally  Hip flexion/abduction/adduction 5/5 bilaterally  Knee flexion/extension 5/5 bilaterally  Ankle DF/PF 5/5 bilaterally  Sensory Exam   Light touch normal      Gait, Coordination, and Reflexes     Gait  Gait: normal (Independent )    Coordination   Finger to nose coordination: normal  Tandem walking coordination: normal    Tremor   Resting tremor: absent  Intention tremor: absent  Action tremor: absent  AUGUSTINE fingers intact bilaterally  Imaging study:    2/9/19 [facility] MRI Brain / Pituitary w/wo contrast -- per report: " MRI BRAIN AND SELLA  WITH AND WITHOUT CONTRAST     INDICATION: Follow-up of pituitary microadenoma  No acute symptomatology      COMPARISON: MRI of the brain from November 14, 2015      TECHNIQUE:  Brain: Axial diffusion-weighted imaging  Axial FLAIR and axial T2  Axial gradient  Axial T1 postcontrast Axial bravo postcontrast   Sella: Sagittal and coronal T1  Coronal T2  Sagittal and coronal T1 postcontrast with fat suppression  Coronal dynamic enhancement      Targeted images of the sella were performed requiring additional time at acquisition and interpretation of approximately 25%     IV Contrast:  7 mL of Gadobutrol injection (SINGLE-DOSE)      IMAGE QUALITY:  Diagnostic      FINDINGS:     BRAIN PARENCHYMA:  There is no discrete mass, mass effect or midline shift  No abnormal white matter signal identified  Brainstem and cerebellum demonstrate normal signal  There is no intracranial hemorrhage  There is no evidence of acute infarction and   diffusion imaging is unremarkable    Postcontrast imaging is normal      VENTRICLES:  Normal      SELLA AND PITUITARY GLAND:  Redemonstrated is the slight asymmetric enlargement of the right pituitary gland secondary to an underlying 8 x 4 mm region of differential enhancement consistent with the previously reported microadenoma  This is best   appreciated on image 9 of series 13  The optic chiasm, infundibulum and cavernous sinus regions are unremarkable      ORBITS:  Normal      PARANASAL SINUSES:  Normal      VASCULATURE:  Evaluation of the major intracranial vasculature demonstrates appropriate flow voids      CALVARIUM AND SKULL BASE:  Normal      EXTRACRANIAL SOFT TISSUES:  Normal      IMPRESSION:     Stable pituitary microadenoma    Otherwise unremarkable MRI of the brain      Workstation performed: [id number] "

## 2019-03-08 ENCOUNTER — TELEPHONE (OUTPATIENT)
Dept: FAMILY MEDICINE CLINIC | Facility: CLINIC | Age: 34
End: 2019-03-08

## 2019-03-08 NOTE — TELEPHONE ENCOUNTER
----- Message from Chuckie Weiss sent at 3/8/2019  8:06 AM EST -----  Regarding: RE: Immunization [facility]  Contact: [phone]  Good morning Hiwot,    Our department reached out 3 times to obtain patients immunization but have been unsuccessful  Thank you,  Sherman Thorne    ----- Message -----  From: Binu Oconnor  Sent: 1/28/2019   3:14 PM  To: [facility]  Subject: Immunization [address]             I have reviewed both [employer] and Epic and need your assistance in locating Immunization(s)  Per Patient, testing was done by [facility] Flu shot done in November  Thank you

## 2019-03-14 ENCOUNTER — TELEPHONE (OUTPATIENT)
Dept: FAMILY MEDICINE CLINIC | Facility: CLINIC | Age: 34
End: 2019-03-14

## 2019-03-14 NOTE — TELEPHONE ENCOUNTER
First, she should talk to her OB/GYN to see what medication they would prefer her to be on before discontinuing it  They may want to bridge her over to a different medication so that her moods are not affected by change in medication

## 2019-03-14 NOTE — TELEPHONE ENCOUNTER
Why does she want to discontinue the Seroquel? She should not stop a medication without first talking with the provider who started her on it

## 2019-03-14 NOTE — TELEPHONE ENCOUNTER
Spoke to patient, OB/GYN want us to handle the medication  Uma Mcnair is unsure what to do  PLEASE ADVISE

## 2019-03-15 NOTE — TELEPHONE ENCOUNTER
Seroquel is considered a pregnancy category C so from my standpoint she should probably wean off of it and not take anything during her pregnancy so I would on cut the dose in half for 1 week and then stop she is on a relatively low dose people usually take up to 300-600 mg so I do not think she will have any ill affects by stopping it with regards to her anxiety or mental status if she has issues during her pregnancy with anxiety or depression then she is going to need to see a psychiatrist for any prescriptions because on their will only be certain medications that she can take for example sertraline has been studied for depression and has a better pregnancy category radiating but again I would not be willing to prescribe these the should be prescribed by a psychiatrist so she should wean off of the Seroquel as quickly as possible

## 2019-06-11 DIAGNOSIS — D51.9 ANEMIA DUE TO VITAMIN B12 DEFICIENCY, UNSPECIFIED B12 DEFICIENCY TYPE: ICD-10-CM

## 2019-06-11 DIAGNOSIS — Z98.84 S/P BARIATRIC SURGERY: ICD-10-CM

## 2019-06-11 RX ORDER — CYANOCOBALAMIN 1000 UG/ML
1000 INJECTION INTRAMUSCULAR; SUBCUTANEOUS
Qty: 1 ML | Refills: 2 | Status: SHIPPED | OUTPATIENT
Start: 2019-06-11 | End: 2019-12-04

## 2019-08-16 ENCOUNTER — TELEPHONE (OUTPATIENT)
Dept: FAMILY MEDICINE CLINIC | Facility: CLINIC | Age: 34
End: 2019-08-16

## 2019-11-12 ENCOUNTER — HOSPITAL ENCOUNTER (EMERGENCY)
Facility: HOSPITAL | Age: 34
Discharge: ANOTHER HOSPITAL | End: 2019-11-12
Attending: EMERGENCY MEDICINE | Admitting: EMERGENCY MEDICINE
Payer: COMMERCIAL

## 2019-11-12 VITALS
OXYGEN SATURATION: 97 % | DIASTOLIC BLOOD PRESSURE: 69 MMHG | TEMPERATURE: 97.6 F | RESPIRATION RATE: 20 BRPM | HEART RATE: 82 BPM | SYSTOLIC BLOOD PRESSURE: 126 MMHG

## 2019-11-12 DIAGNOSIS — O47.9 UTERINE CONTRACTIONS DURING PREGNANCY: Primary | ICD-10-CM

## 2019-11-12 DIAGNOSIS — Z37.9 NORMAL LABOR: ICD-10-CM

## 2019-11-12 PROCEDURE — 96360 HYDRATION IV INFUSION INIT: CPT

## 2019-11-12 PROCEDURE — 99284 EMERGENCY DEPT VISIT MOD MDM: CPT

## 2019-11-12 PROCEDURE — 99285 EMERGENCY DEPT VISIT HI MDM: CPT | Performed by: EMERGENCY MEDICINE

## 2019-11-12 RX ORDER — SODIUM CHLORIDE 9 MG/ML
125 INJECTION, SOLUTION INTRAVENOUS CONTINUOUS
Status: DISCONTINUED | OUTPATIENT
Start: 2019-11-12 | End: 2019-11-12 | Stop reason: HOSPADM

## 2019-11-12 RX ORDER — FAMOTIDINE 20 MG/1
20 TABLET, FILM COATED ORAL 2 TIMES DAILY
COMMUNITY
End: 2020-03-15

## 2019-11-12 RX ORDER — CALCIUM CARBONATE 200(500)MG
1 TABLET,CHEWABLE ORAL DAILY
COMMUNITY
End: 2019-12-06 | Stop reason: ALTCHOICE

## 2019-11-12 RX ORDER — UREA 10 %
250 LOTION (ML) TOPICAL 2 TIMES DAILY
COMMUNITY
End: 2019-12-06 | Stop reason: ALTCHOICE

## 2019-11-12 RX ADMIN — SODIUM CHLORIDE 125 ML/HR: 0.9 INJECTION, SOLUTION INTRAVENOUS at 16:10

## 2019-11-12 NOTE — ED PROVIDER NOTES
History  Chief Complaint   Patient presents with    Laboring     patient c/o contractions increasing since yesterday at 1PM     Patient is a G [id number] at 39w2d presents with intermittent contractions that initially started yesterday around 10 minutes apart "not as intense and more in the abdomen"   Patient went to [facility], was placed on a monitor and observed  She was discharged home  Patient reports that today the contractions are approximately 8 minutes apart and progressed from yesterday in intensity as well  Patient with increaseing pelvic pressure not abdominal pressure  No rupture of membranes  No problems for patient during the pregnancy or for the baby  She has been having increase in brown/red discharge that OB is is aware of ; however, no active vaginal bleeding  She has been following with [facility] for her pregnancies  She has no history of abnormal Pap smears, STDs    She has no history of gynecological surgeries in the past       History provided by:  Patient   used: No    Abdominal Cramping   Pain location:  Generalized  Pain quality: aching, cramping, fullness and pressure    Pain radiates to:  Groin  Pain severity:  Moderate  Onset quality:  Gradual  Timing:  Constant  Progression:  Worsening  Chronicity:  New  Context: not alcohol use, not awakening from sleep, not diet changes, not eating, not laxative use, not medication withdrawal, not previous surgeries, not recent illness, not recent sexual activity, not recent travel, not retching, not sick contacts, not suspicious food intake and not trauma    Relieved by:  None tried  Worsened by:  Nothing  Ineffective treatments:  None tried  Associated symptoms: vaginal discharge    Associated symptoms: no anorexia, no belching, no chest pain, no chills, no constipation, no cough, no diarrhea, no dysuria, no fatigue, no fever, no flatus, no hematemesis, no hematochezia, no hematuria, no melena, no nausea, no shortness of breath, no sore throat, no vaginal bleeding and no vomiting    Risk factors: pregnancy    Risk factors: no alcohol abuse, no aspirin use, not elderly, has not had multiple surgeries, no NSAID use, not obese and no recent hospitalization        Prior to Admission Medications   Prescriptions Last Dose Informant Patient Reported? Taking? Cholecalciferol (REPLESTA PO)   Yes Yes   Sig: Take by mouth   Prenatal Vit-Fe Fumarate-FA (PRENATAL 1+1 PO) 11/12/2019 at Unknown time  Yes Yes   Sig: Take by mouth   QUEtiapine (SEROquel) 50 mg tablet Not Taking at Unknown time  No No   Sig: Take 1 tablet (50 mg total) by mouth daily at bedtime   Patient not taking: Reported on 11/12/2019   SUMAtriptan Succinate 6 MG/0 5ML SOAJ Not Taking at Unknown time  No No   Sig: Inject 0 5mL at onset of headache and may repeat in 2 hours, MAX 2 inject a day MAX 3 days/week   Patient not taking: Reported on 11/12/2019   calcium carbonate (TUMS) 500 mg chewable tablet   Yes Yes   Sig: Chew 1 tablet daily   clonazePAM (KLONOPIN) 0 5 mg tablet Not Taking at Unknown time  No No   Sig: Take 1 tablet (0 5 mg total) by mouth daily as needed for anxiety   Patient not taking: Reported on 11/12/2019   cyanocobalamin 1,000 mcg/mL Past Month at Unknown time  No Yes   Sig: Inject 1 mL (1,000 mcg total) into a muscle every 30 (thirty) days   etanercept (ENBREL SURECLICK) 50 MG/ML injection Not Taking at Unknown time  Yes No   Sig: Inject under the skin   famotidine (PEPCID) 20 mg tablet   Yes Yes   Sig: Take 20 mg by mouth 2 (two) times a day   levonorgestrel-ethinyl estradiol (AVIANE,ALESSE,LESSINA) 0 1-20 MG-MCG per tablet Not Taking at Unknown time  Yes No   Sig: Take 1 tablet by mouth daily     magnesium gluconate (MAGONATE) 500 mg tablet   Yes Yes   Sig: Take 250 mg by mouth 2 (two) times a day   tiZANidine (ZANAFLEX) 4 mg tablet Not Taking at Unknown time  Yes No   Sig: Take 1 tablet by mouth 3 (three) times a day as needed      Facility-Administered Medications: None Past Medical History:   Diagnosis Date    Abnormal LFTs (liver function tests)     LAST ASSESSED: [id number]    Adalimumab (Humira) long-term use     LAST ASSESSED: [id number]    Affective disorder ([facility])     LAST ASSESSED: [id number]    Anxiety     Aspiration into airway     LAST ASSESSED: 92SRG4565    Benign neoplasm of pituitary gland ([facility])     LAST ASSESSED: 95XZC7400    Chondromalacia, patella     LAST ASSESSED: 66IKE2694    Common migraine without aura     LAST ASSESSED: 54RRZ4892    Controlled type 2 diabetes mellitus with diabetic nephropathy, without long-term current use of insulin ([facility])     LAST ASSESSED: 11QQH0949    Depression with anxiety     Diabetes mellitus ([facility])     LAST ASSESSED: 42BLC1755    Elevated C-reactive protein     LAST ASSESSED: 28ACH1496    Exertional dyspnea     LAST ASSESSED: 50NZQ4562    Hemicrania continua     LAST ASSESSED: 35LWD0939    High protein C activity level     LAST ASSESSED: 10HWP0207    Hyperprolactinemia ([facility])     LAST ASSESSED: 23ZOC8321    Hypertension     Long-term use of hydroxychloroquine     LAST ASSESSED: 40FMF9646    Migraine     LAST ASSESSED: 37HKW2057    Polyarthritis     LAST ASSESSED: 83COC1946    Polycystic ovarian disease     Prediabetes     LAST ASSESSED: 42PUK6701    Rheumatoid arthritis ([facility])     LAST ASSESSED: 49ODS3762    Seronegative rheumatoid arthritis ([facility])     LAST ASSESSED: 92QFO3963    TMJ (dislocation of temporomandibular joint)     APPEARANCE LAST ASSESSED: 42GES2742    Trochanteric bursitis of right hip     LAST ASSESSED: 84IVV9614    Vitamin D deficiency     LAST ASSESSED: 95FKY2326       Past Surgical History:   Procedure Laterality Date    DENTAL SURGERY      GASTRECTOMY      SLEEVE RESOLVED: [id number]    NASAL SEPTUM SURGERY      DEVIATION REPAIR    NASAL SEPTUM SURGERY      TONSILLECTOMY      TONSILLECTOMY      TOOTH EXTRACTION      WISDOM TOOTH EXTRACTION      WISDOM TOOTH EXTRACTION Family History   Problem Relation Age of Onset    Arthritis Mother     Psoriasis Mother     No Known Problems Father     Lung disease Maternal Grandfather         BLACK    Diabetes Paternal Grandmother     Cancer Paternal Grandfather     Coronary artery disease Family     Colon cancer Family     Diabetes Family      I have reviewed and agree with the history as documented  Social History     Tobacco Use    Smoking status: Never Smoker    Smokeless tobacco: Never Used   Substance Use Topics    Alcohol use: No     Comment: OCCASIONAL - 1 DRINK/MONTH AS PER ALLSCRIPTS    Drug use: No        Review of Systems   Constitutional: Negative  Negative for chills, fatigue and fever  HENT: Negative  Negative for sore throat  Eyes: Negative  Respiratory: Negative  Negative for cough and shortness of breath  Cardiovascular: Negative  Negative for chest pain  Gastrointestinal: Positive for abdominal pain  Negative for anorexia, constipation, diarrhea, flatus, hematemesis, hematochezia, melena, nausea and vomiting  Endocrine: Negative  Genitourinary: Positive for vaginal discharge  Negative for dysuria, hematuria and vaginal bleeding  Musculoskeletal: Negative  Hematological: Negative  Psychiatric/Behavioral: Negative  All other systems reviewed and are negative  Physical Exam  Physical Exam   Constitutional: She is oriented to person, place, and time  She appears well-developed and well-nourished  No distress  HENT:   Head: Normocephalic and atraumatic  Mouth/Throat: Oropharynx is clear and moist    Eyes: Pupils are equal, round, and reactive to light  Conjunctivae and EOM are normal    Neck: Normal range of motion  Neck supple  Cardiovascular: Regular rhythm, normal heart sounds and intact distal pulses  Tachycardia present  No murmur heard  Pulses:       Radial pulses are 2+ on the right side, and 2+ on the left side          Dorsalis pedis pulses are 2+ on the right side, and 2+ on the left side  Pulmonary/Chest: Effort normal and breath sounds normal  No stridor  No respiratory distress  Abdominal: Soft  Bowel sounds are normal  She exhibits no distension  There is no tenderness  Genitourinary: Pelvic exam was performed with patient supine  There is no rash, tenderness or lesion on the right labia  There is no rash, tenderness, lesion or injury on the left labia  Genitourinary Comments: Chaperones present  Sterile gloves used    Gravid uterus - zero station, 4 cm dilated and 50% effaced        Musculoskeletal: Normal range of motion  She exhibits no edema, tenderness or deformity  Neurological: She is alert and oriented to person, place, and time  No cranial nerve deficit  GCS eye subscore is 4  GCS verbal subscore is 5  GCS motor subscore is 6  No slurred speech  No facial asymmtery   Skin: Skin is warm  Capillary refill takes less than 2 seconds  She is not diaphoretic  Psychiatric: She has a normal mood and affect  Nursing note and vitals reviewed      '150's      Vital Signs  ED Triage Vitals   Temperature Pulse Respirations Blood Pressure SpO2   11/12/19 1547 11/12/19 1548 11/12/19 1548 11/12/19 1548 11/12/19 1548   97 6 °F (36 4 °C) 89 18 141/81 95 %      Temp Source Heart Rate Source Patient Position - Orthostatic VS BP Location FiO2 (%)   11/12/19 1547 11/12/19 1548 11/12/19 1548 11/12/19 1548 --   Temporal Monitor Lying Right arm       Pain Score       --                  Vitals:    11/12/19 1548 11/12/19 1600 11/12/19 1615 11/12/19 1645   BP: 141/81 141/81 138/65 126/69   Pulse: 89 98 84 82   Patient Position - Orthostatic VS: Lying Lying Lying Lying         Visual Acuity      ED Medications  Medications   sodium chloride 0 9 % infusion (125 mL/hr Intravenous New Bag 11/12/19 1610)       Diagnostic Studies  Results Reviewed     None                 No orders to display              Procedures  Procedures       ED Course  ED Course as    1557 Patient is a 27year old female G1 currently 39 weeks pregnant coming in with contractions  Will obtain exam, FHT's and plan for transfer  Portions of the record may have been created with voice recognition software  Occasional wrong word or "sound a like" substitutions may have occurred due to the inherent limitations of voice recognition software  Read the chart carefully and recognize, using context, where substitutions have occurred  19 Mae Rocha accepts patient to L&D at [facility]  [phone] for report  1611 Patient updated on transfer and wishes to go to [facility] instead  Will call PACS       21  with Dr Bina Fraire [facility] in [facility] Patient updated on plan for transfer to [facility] and aware of delay but she wishes to go this site  Patient agreeable  Resting more comfortably in bed  MDM    Disposition  Final diagnoses:   Uterine contractions during pregnancy   Normal labor     Time reflects when diagnosis was documented in both MDM as applicable and the Disposition within this note     Time User Action Codes Description Comment    2019  4:04 PM Ban Cobian Add [O62 2] Uterine contractions during pregnancy     2019  4:04 PM Gerardo Cobian Add [O62 9] Labor abnormal     2019  4:04 PM aBn Cobianon Remove [O62 9] Labor abnormal     2019  4:05 PM Ban Cobianon Add [O60 10X0] Active  labor     2019  4:05 PM Ban Cobianon Remove [O60 10X0] Active  labor     2019  4:05 PM Ban Cobian Add [O80,  Z37 9] Normal labor       ED Disposition     ED Disposition Condition Date/Time Comment    Transfer to Another Facility-In Network    4:04 PM Bj Jaeger should be transferred out to [facility]          MD Documentation      Most Recent Value   Accepting Phys[facility]  [facility]   Sending MD Marshall Camarillo      RN Documentation      Most Recent Value   Accepting [facility], [facility]   Bed Assignment  ER   Report Given to  Quinton SPEARS      Follow-up Information    None         Discharge Medication List as of 11/12/2019  4:50 PM      CONTINUE these medications which have NOT CHANGED    Details   calcium carbonate (TUMS) 500 mg chewable tablet Chew 1 tablet daily, Historical Med      Cholecalciferol (REPLESTA PO) Take by mouth, Historical Med      cyanocobalamin 1,000 mcg/mL Inject 1 mL (1,000 mcg total) into a muscle every 30 (thirty) days, Starting Tue 6/11/2019, Normal      famotidine (PEPCID) 20 mg tablet Take 20 mg by mouth 2 (two) times a day, Historical Med      magnesium gluconate (MAGONATE) 500 mg tablet Take 250 mg by mouth 2 (two) times a day, Historical Med      Prenatal Vit-Fe Fumarate-FA (PRENATAL 1+1 PO) Take by mouth, Historical Med      clonazePAM (KLONOPIN) 0 5 mg tablet Take 1 tablet (0 5 mg total) by mouth daily as needed for anxiety, Starting Mon 1/28/2019, Print      etanercept (ENBREL SURECLICK) 50 MG/ML injection Inject under the skin, Starting Fri 8/12/2016, Historical Med      levonorgestrel-ethinyl estradiol (AVIANE,ALESSE,LESSINA) 0 1-20 MG-MCG per tablet Take 1 tablet by mouth daily  , Until Discontinued, Historical Med      QUEtiapine (SEROquel) 50 mg tablet Take 1 tablet (50 mg total) by mouth daily at bedtime, Starting Mon 1/28/2019, Normal      SUMAtriptan Succinate 6 MG/0 5ML SOAJ Inject 0 5mL at onset of headache and may repeat in 2 hours, MAX 2 inject a day MAX 3 days/week, Normal      tiZANidine (ZANAFLEX) 4 mg tablet Take 1 tablet by mouth 3 (three) times a day as needed, Starting Fri 1/13/2017, Historical Med           No discharge procedures on file      ED Provider  Electronically Signed by           Chanel Ojeda,   11/12/19 1773

## 2019-11-12 NOTE — EMTALA/ACUTE CARE TRANSFER
[facility] EMERGENCY DEPARTMENT  [address] [facility] [address]  Dept: [phone]      EMTALA TRANSFER CONSENT    NAME Josep Greene                                         1985                              MRN [id number]    I have been informed of my rights regarding examination, treatment, and transfer   by Dr Americo Tavarez DO    Benefits:      Risks:        Consent for Transfer:  I acknowledge that my medical condition has been evaluated and explained to me by the emergency department physician or other qualified medical person and/or my attending physician, who has recommended that I be transferred to the service of Dr Rey Corbett      at [facility]    The above potential benefits of such transfer, the potential risks associated with such transfer, and the probable risks of not being transferred have been explained to me, and I fully understand them  The doctor has explained that, in my case, the benefits of transfer outweigh the risks  I agree to be transferred  I authorize the performance of emergency medical procedures and treatments upon me in both transit and upon arrival at the receiving facility  Additionally, I authorize the release of any and all medical records to the receiving facility and request they be transported with me, if possible  I understand that the safest mode of transportation during a medical emergency is an ambulance and that the Hospital advocates the use of this mode of transport  Risks of traveling to the receiving facility by car, including absence of medical control, life sustaining equipment, such as oxygen, and medical personnel has been explained to me and I fully understand them  (RADHA CORRECT BOX BELOW)  [  ]  I consent to the stated transfer and to be transported by ambulance/helicopter    [  ]  I consent to the stated transfer, but refuse transportation by ambulance and accept full responsibility for my transportation by car  I understand the risks of non-ambulance transfers and I exonerate the Hospital and its staff from any deterioration in my condition that results from this refusal     X___________________________________________    DATE  19  TIME________  Signature of patient or legally responsible individual signing on patient behalf           RELATIONSHIP TO PATIENT_________________________          Provider Certification    NAME Olivier Ambrosio                                         1985                              MRN [id number]    A medical screening exam was performed on the above named patient  Based on the examination:    Condition Necessitating Transfer The primary encounter diagnosis was Uterine contractions during pregnancy  A diagnosis of Normal labor was also pertinent to this visit  Patient Condition:      Reason for Transfer:      Transfer Requirements: Facility     · Space available and qualified personnel available for treatment as acknowledged by    · Agreed to accept transfer and to provide appropriate medical treatment as acknowledged by          · Appropriate medical records of the examination and treatment of the patient are provided at the time of transfer   [address] [facility], [address] _______  · Transfer will be performed by qualified personnel from    and appropriate transfer equipment as required, including the use of necessary and appropriate life support measures      Provider Certification: I have examined the patient and explained the following risks and benefits of being transferred/refusing transfer to the patient/family:         Based on these reasonable risks and benefits to the patient and/or the unborn child(brigid), and based upon the information available at the time of the patients examination, I certify that the medical benefits reasonably to be expected from the provision of appropriate medical treatments at another medical facility outweigh the increasing risks, if any, to the individuals medical condition, and in the case of labor to the unborn child, from effecting the transfer      X____________________________________________ DATE 11/12/19        TIME_______      ORIGINAL - SEND TO MEDICAL RECORDS   COPY - SEND WITH PATIENT DURING TRANSFER

## 2019-11-12 NOTE — EMTALA/ACUTE CARE TRANSFER
[address] [facility] EMERGENCY DEPARTMENT  [address] [place] [id number]  Dept: [phone]      EMTALA TRANSFER CONSENT    NAME Lino Jarrell                                         1985                              MRN [id number]    I have been informed of my rights regarding examination, treatment, and transfer   by Dr Jose Lizarraga DO    Benefits:      Risks:        Consent for Transfer:  I acknowledge that my medical condition has been evaluated and explained to me by the emergency department physician or other qualified medical person and/or my attending physician, who has recommended that I be transferred to the service of Dr Barbi Agee    at [facility]    The above potential benefits of such transfer, the potential risks associated with such transfer, and the probable risks of not being transferred have been explained to me, and I fully understand them  The doctor has explained that, in my case, the benefits of transfer outweigh the risks  I agree to be transferred  I authorize the performance of emergency medical procedures and treatments upon me in both transit and upon arrival at the receiving facility  Additionally, I authorize the release of any and all medical records to the receiving facility and request they be transported with me, if possible  I understand that the safest mode of transportation during a medical emergency is an ambulance and that the Hospital advocates the use of this mode of transport  Risks of traveling to the receiving facility by car, including absence of medical control, life sustaining equipment, such as oxygen, and medical personnel has been explained to me and I fully understand them  (RADHA CORRECT BOX BELOW)  [  ]  I consent to the stated transfer and to be transported by ambulance/helicopter  [  ]  I consent to the stated transfer, but refuse transportation by ambulance and accept full responsibility for my transportation by car    I understand the risks of non-ambulance transfers and I exonerate the Hospital and its staff from any deterioration in my condition that results from this refusal     X___________________________________________    DATE  19  TIME________  Signature of patient or legally responsible individual signing on patient behalf           RELATIONSHIP TO PATIENT_________________________          Provider Certification    NAME Joni Puckett                                         1985                              MRN [id number]    A medical screening exam was performed on the above named patient  Based on the examination:    Condition Necessitating Transfer The primary encounter diagnosis was Uterine contractions during pregnancy  A diagnosis of Normal labor was also pertinent to this visit  Patient Condition:      Reason for Transfer:      Transfer Requirements: Facility     · Space available and qualified personnel available for treatment as acknowledged by    · Agreed to accept transfer and to provide appropriate medical treatment as acknowledged by          · Appropriate medical records of the examination and treatment of the patient are provided at the time of transfer   [address] [facility], [address] _______  · Transfer will be performed by qualified personnel from    and appropriate transfer equipment as required, including the use of necessary and appropriate life support measures      Provider Certification: I have examined the patient and explained the following risks and benefits of being transferred/refusing transfer to the patient/family:         Based on these reasonable risks and benefits to the patient and/or the unborn child(brigid), and based upon the information available at the time of the patients examination, I certify that the medical benefits reasonably to be expected from the provision of appropriate medical treatments at another medical facility outweigh the increasing risks, if any, to the individuals medical condition, and in the case of labor to the unborn child, from effecting the transfer      X____________________________________________ DATE 11/12/19        TIME_______      ORIGINAL - SEND TO MEDICAL RECORDS   COPY - SEND WITH PATIENT DURING TRANSFER

## 2019-12-04 ENCOUNTER — OFFICE VISIT (OUTPATIENT)
Dept: FAMILY MEDICINE CLINIC | Facility: CLINIC | Age: 34
End: 2019-12-04
Payer: COMMERCIAL

## 2019-12-04 VITALS
SYSTOLIC BLOOD PRESSURE: 106 MMHG | WEIGHT: 209.2 LBS | BODY MASS INDEX: 34.85 KG/M2 | HEIGHT: 65 IN | HEART RATE: 81 BPM | OXYGEN SATURATION: 97 % | DIASTOLIC BLOOD PRESSURE: 62 MMHG | TEMPERATURE: 97 F

## 2019-12-04 DIAGNOSIS — E22.1 HYPERPROLACTINEMIA (HCC): Primary | ICD-10-CM

## 2019-12-04 DIAGNOSIS — D51.9 ANEMIA DUE TO VITAMIN B12 DEFICIENCY, UNSPECIFIED B12 DEFICIENCY TYPE: ICD-10-CM

## 2019-12-04 DIAGNOSIS — M06.09 RHEUMATOID ARTHRITIS OF MULTIPLE SITES WITH NEGATIVE RHEUMATOID FACTOR (HCC): ICD-10-CM

## 2019-12-04 DIAGNOSIS — Z13.220 SCREENING FOR HYPERLIPIDEMIA: ICD-10-CM

## 2019-12-04 PROBLEM — K21.9 GASTROESOPHAGEAL REFLUX DISEASE: Status: ACTIVE | Noted: 2019-10-16

## 2019-12-04 PROCEDURE — 99213 OFFICE O/P EST LOW 20 MIN: CPT | Performed by: PHYSICIAN ASSISTANT

## 2019-12-04 PROCEDURE — 3008F BODY MASS INDEX DOCD: CPT | Performed by: PHYSICIAN ASSISTANT

## 2019-12-04 RX ORDER — CYANOCOBALAMIN 1000 UG/ML
INJECTION INTRAMUSCULAR; SUBCUTANEOUS
Qty: 1 ML | Refills: 5 | Status: SHIPPED | OUTPATIENT
Start: 2019-12-04

## 2019-12-04 NOTE — PROGRESS NOTES
Assessment/Plan:    Problem List Items Addressed This Visit        Endocrine    Hyperprolactinemia ([facility] ) - Primary    Relevant Orders    Prolactin       Musculoskeletal and Integument    Rheumatoid arthritis of multiple sites with negative rheumatoid factor (HCC)    Relevant Orders    RF Screen w/ Reflex to Titer    C-reactive protein      Other Visit Diagnoses     Anemia due to vitamin B12 deficiency, unspecified B12 deficiency type        Relevant Medications    cyanocobalamin 1,000 mcg/mL    Other Relevant Orders    Vitamin B12    CBC and differential    Screening for hyperlipidemia        Relevant Orders    Comprehensive metabolic panel    Lipid panel           Diagnoses and all orders for this visit:    Hyperprolactinemia ([facility] )  -     Prolactin; Future    Rheumatoid arthritis of multiple sites with negative rheumatoid factor (HCC)  -     RF Screen w/ Reflex to Titer; Future  -     C-reactive protein; Future    Anemia due to vitamin B12 deficiency, unspecified B12 deficiency type  -     Vitamin B12; Future  -     CBC and differential; Future  -     cyanocobalamin 1,000 mcg/mL; Inject IM every 30 days x 6 months    Screening for hyperlipidemia  -     Comprehensive metabolic panel; Future  -     Lipid panel; Future           Ordered labs  Refilled vitamin B12      Subjective:      Patient ID: Henry Bryant is a 27 y o  female  Carola Rizo is a pleasant 27year old female who is here today for a follow-up  She had a vaginal delivery 3 weeks ago, and would like to know if she can get up to date on her screening labs  She has a history of hyperprolactinemia  She was following with neurology, but they told her she did not have to return for 5 years for repeat imaging  She had her levels monitored during her pregnancy by maternal fetal medicine and was told to follow-up here  She is currently trying to breast feed and has been having issues with milk production  She was also having some minor joint pains   She was diagnosed with RA in the past, but would like to know if she could also have those labs repeated  She also has a history of B12 deficiency secondary to gastric bypass surgery  She is requesting refills and repeat labs as well  The following portions of the patient's history were reviewed and updated as appropriate:   She has a past medical history of Abnormal LFTs (liver function tests), Adalimumab (Humira) long-term use, Affective disorder ([facility] ), Anxiety, Aspiration into airway, Benign neoplasm of pituitary gland (HCC), Chondromalacia, patella, Common migraine without aura, Controlled type 2 diabetes mellitus with diabetic nephropathy, without long-term current use of insulin ([facility] ), Depression with anxiety, Diabetes mellitus ([facility] ), Elevated C-reactive protein, Exertional dyspnea, Hemicrania continua, High protein C activity level, Hyperprolactinemia ([facility] ), Hypertension, Long-term use of hydroxychloroquine, Migraine, Polyarthritis, Polycystic ovarian disease, Prediabetes, Rheumatoid arthritis ([facility] ), Seronegative rheumatoid arthritis ([facility] ), TMJ (dislocation of temporomandibular joint), Trochanteric bursitis of right hip, and Vitamin D deficiency  ,  does not have any pertinent problems on file  ,   has a past surgical history that includes [place] tooth extraction; Tonsillectomy; Nasal septum surgery; [place] tooth extraction; Nasal septum surgery; Tonsillectomy; Dental surgery; Gastrectomy; and Tooth extraction  ,  family history includes Arthritis in her mother; Cancer in her paternal grandfather; Colon cancer in her family; Coronary artery disease in her family; Diabetes in her family and paternal grandmother; Lung disease in her maternal grandfather; No Known Problems in her father; Psoriasis in her mother  ,   reports that she has never smoked  She has never used smokeless tobacco  She reports that she drank alcohol  She reports that she does not use drugs  ,  is allergic to augmentin [amoxicillin-pot clavulanate]; nsaids; and pollen extract     Current Outpatient Medications   Medication Sig Dispense Refill    Cholecalciferol (REPLESTA PO) Take 10,000 Units by mouth once a week       Prenatal Vit-Fe Fumarate-FA (PRENATAL 1+1 PO) Take by mouth      calcium carbonate (TUMS) 500 mg chewable tablet Chew 1 tablet daily      clonazePAM (KLONOPIN) 0 5 mg tablet Take 1 tablet (0 5 mg total) by mouth daily as needed for anxiety (Patient not taking: Reported on 11/12/2019) 30 tablet 0    cyanocobalamin 1,000 mcg/mL Inject IM every 30 days x 6 months 1 mL 5    etanercept (ENBREL SURECLICK) 50 MG/ML injection Inject under the skin      famotidine (PEPCID) 20 mg tablet Take 20 mg by mouth 2 (two) times a day      levonorgestrel-ethinyl estradiol (AVIANE,ALESSE,LESSINA) 0 1-20 MG-MCG per tablet Take 1 tablet by mouth daily   magnesium gluconate (MAGONATE) 500 mg tablet Take 250 mg by mouth 2 (two) times a day      QUEtiapine (SEROquel) 50 mg tablet Take 1 tablet (50 mg total) by mouth daily at bedtime (Patient not taking: Reported on 11/12/2019) 90 tablet 1    SUMAtriptan Succinate 6 MG/0 5ML SOAJ Inject 0 5mL at onset of headache and may repeat in 2 hours, MAX 2 inject a day MAX 3 days/week (Patient not taking: Reported on 11/12/2019) 9 Cartridge 2    tiZANidine (ZANAFLEX) 4 mg tablet Take 1 tablet by mouth 3 (three) times a day as needed       No current facility-administered medications for this visit  Review of Systems   Constitutional: Negative for chills, diaphoresis, fatigue and fever  HENT: Negative for congestion, ear pain, postnasal drip, rhinorrhea, sneezing, sore throat and trouble swallowing  Eyes: Negative for pain and visual disturbance  Respiratory: Negative for apnea, cough, shortness of breath and wheezing  Cardiovascular: Negative for chest pain and palpitations  Gastrointestinal: Negative for abdominal pain, constipation, diarrhea, nausea and vomiting     Endocrine: Decreased milk production   Genitourinary: Negative for dysuria  Musculoskeletal: Positive for arthralgias  Negative for gait problem and myalgias  Neurological: Negative for dizziness, syncope, weakness, light-headedness, numbness and headaches  Psychiatric/Behavioral: Negative for suicidal ideas  The patient is not nervous/anxious  Objective:  Vitals:    12/04/19 1130   BP: 106/62   Pulse: 81   Temp: (!) 97 °F (36 1 °C)   SpO2: 97%   Weight: 94 9 kg (209 lb 3 2 oz)   Height: 5' 5" (1 651 m)     Body mass index is 34 81 kg/m²  Physical Exam   Constitutional: She is oriented to person, place, and time  She appears well-developed and well-nourished  No distress  HENT:   Head: Normocephalic and atraumatic  Right Ear: External ear normal    Left Ear: External ear normal    Nose: Nose normal    Mouth/Throat: Oropharynx is clear and moist    Eyes: EOM are normal    Neck: Normal range of motion  Neck supple  Cardiovascular: Normal rate, regular rhythm and normal heart sounds  Exam reveals no gallop and no friction rub  No murmur heard  Pulmonary/Chest: Effort normal and breath sounds normal  No stridor  No respiratory distress  She has no wheezes  She has no rales  Musculoskeletal: Normal range of motion  Neurological: She is alert and oriented to person, place, and time  Skin: Skin is warm and dry  She is not diaphoretic  Psychiatric: She has a normal mood and affect  Her behavior is normal  Judgment and thought content normal    Vitals reviewed

## 2019-12-06 ENCOUNTER — APPOINTMENT (OUTPATIENT)
Dept: LAB | Facility: HOSPITAL | Age: 34
End: 2019-12-06
Payer: COMMERCIAL

## 2019-12-06 ENCOUNTER — HOSPITAL ENCOUNTER (EMERGENCY)
Facility: HOSPITAL | Age: 34
Discharge: HOME | End: 2019-12-06
Attending: EMERGENCY MEDICINE | Admitting: EMERGENCY MEDICINE
Payer: COMMERCIAL

## 2019-12-06 VITALS
BODY MASS INDEX: 34.86 KG/M2 | TEMPERATURE: 97.5 F | HEIGHT: 65 IN | DIASTOLIC BLOOD PRESSURE: 62 MMHG | SYSTOLIC BLOOD PRESSURE: 108 MMHG | HEART RATE: 64 BPM | RESPIRATION RATE: 18 BRPM | WEIGHT: 209.22 LBS | OXYGEN SATURATION: 95 %

## 2019-12-06 DIAGNOSIS — M06.09 RHEUMATOID ARTHRITIS OF MULTIPLE SITES WITH NEGATIVE RHEUMATOID FACTOR (HCC): ICD-10-CM

## 2019-12-06 DIAGNOSIS — D51.9 ANEMIA DUE TO VITAMIN B12 DEFICIENCY, UNSPECIFIED B12 DEFICIENCY TYPE: ICD-10-CM

## 2019-12-06 DIAGNOSIS — E22.1 HYPERPROLACTINEMIA (HCC): ICD-10-CM

## 2019-12-06 DIAGNOSIS — Z13.220 SCREENING FOR HYPERLIPIDEMIA: ICD-10-CM

## 2019-12-06 DIAGNOSIS — R73.01 ELEVATED FASTING GLUCOSE: Primary | ICD-10-CM

## 2019-12-06 DIAGNOSIS — G43.909 MIGRAINE: Primary | ICD-10-CM

## 2019-12-06 DIAGNOSIS — R73.01 ELEVATED FASTING GLUCOSE: ICD-10-CM

## 2019-12-06 LAB
ALBUMIN SERPL BCP-MCNC: 3.5 G/DL (ref 3.5–5)
ALP SERPL-CCNC: 86 U/L (ref 46–116)
ALT SERPL W P-5'-P-CCNC: 30 U/L (ref 12–78)
ANION GAP SERPL CALCULATED.3IONS-SCNC: 9 MMOL/L (ref 4–13)
AST SERPL W P-5'-P-CCNC: 18 U/L (ref 5–45)
BASOPHILS # BLD AUTO: 0.03 THOUSANDS/ΜL (ref 0–0.1)
BASOPHILS NFR BLD AUTO: 0 % (ref 0–1)
BILIRUB SERPL-MCNC: 0.4 MG/DL (ref 0.2–1)
BUN SERPL-MCNC: 11 MG/DL (ref 5–25)
CALCIUM SERPL-MCNC: 8.9 MG/DL (ref 8.3–10.1)
CHLORIDE SERPL-SCNC: 103 MMOL/L (ref 100–108)
CHOLEST SERPL-MCNC: 294 MG/DL (ref 50–200)
CO2 SERPL-SCNC: 29 MMOL/L (ref 21–32)
CREAT SERPL-MCNC: 0.8 MG/DL (ref 0.6–1.3)
CRP SERPL QL: 2 MG/L
EOSINOPHIL # BLD AUTO: 0.28 THOUSAND/ΜL (ref 0–0.61)
EOSINOPHIL NFR BLD AUTO: 4 % (ref 0–6)
ERYTHROCYTE [DISTWIDTH] IN BLOOD BY AUTOMATED COUNT: 12.9 % (ref 11.6–15.1)
GFR SERPL CREATININE-BSD FRML MDRD: 96 ML/MIN/1.73SQ M
GLUCOSE P FAST SERPL-MCNC: 112 MG/DL (ref 65–99)
HCT VFR BLD AUTO: 43.5 % (ref 34.8–46.1)
HDLC SERPL-MCNC: 34 MG/DL
HGB BLD-MCNC: 13.9 G/DL (ref 11.5–15.4)
IMM GRANULOCYTES # BLD AUTO: 0.01 THOUSAND/UL (ref 0–0.2)
IMM GRANULOCYTES NFR BLD AUTO: 0 % (ref 0–2)
LDLC SERPL CALC-MCNC: 181 MG/DL (ref 0–100)
LYMPHOCYTES # BLD AUTO: 3.86 THOUSANDS/ΜL (ref 0.6–4.47)
LYMPHOCYTES NFR BLD AUTO: 50 % (ref 14–44)
MCH RBC QN AUTO: 29.6 PG (ref 26.8–34.3)
MCHC RBC AUTO-ENTMCNC: 32 G/DL (ref 31.4–37.4)
MCV RBC AUTO: 93 FL (ref 82–98)
MONOCYTES # BLD AUTO: 0.47 THOUSAND/ΜL (ref 0.17–1.22)
MONOCYTES NFR BLD AUTO: 6 % (ref 4–12)
NEUTROPHILS # BLD AUTO: 3.09 THOUSANDS/ΜL (ref 1.85–7.62)
NEUTS SEG NFR BLD AUTO: 40 % (ref 43–75)
NONHDLC SERPL-MCNC: 260 MG/DL
NRBC BLD AUTO-RTO: 0 /100 WBCS
PLATELET # BLD AUTO: 253 THOUSANDS/UL (ref 149–390)
PMV BLD AUTO: 10.5 FL (ref 8.9–12.7)
POTASSIUM SERPL-SCNC: 4.1 MMOL/L (ref 3.5–5.3)
PROLACTIN SERPL-MCNC: 8.8 NG/ML
PROT SERPL-MCNC: 7.8 G/DL (ref 6.4–8.2)
RBC # BLD AUTO: 4.7 MILLION/UL (ref 3.81–5.12)
RHEUMATOID FACT SER QL LA: NEGATIVE
SODIUM SERPL-SCNC: 141 MMOL/L (ref 136–145)
TRIGL SERPL-MCNC: 396 MG/DL
VIT B12 SERPL-MCNC: 631 PG/ML (ref 100–900)
WBC # BLD AUTO: 7.74 THOUSAND/UL (ref 4.31–10.16)

## 2019-12-06 PROCEDURE — 85025 COMPLETE CBC W/AUTO DIFF WBC: CPT

## 2019-12-06 PROCEDURE — 82607 VITAMIN B-12: CPT

## 2019-12-06 PROCEDURE — 84146 ASSAY OF PROLACTIN: CPT

## 2019-12-06 PROCEDURE — 99283 EMERGENCY DEPT VISIT LOW MDM: CPT

## 2019-12-06 PROCEDURE — 86140 C-REACTIVE PROTEIN: CPT

## 2019-12-06 PROCEDURE — 86430 RHEUMATOID FACTOR TEST QUAL: CPT

## 2019-12-06 PROCEDURE — 36415 COLL VENOUS BLD VENIPUNCTURE: CPT

## 2019-12-06 PROCEDURE — 80053 COMPREHEN METABOLIC PANEL: CPT

## 2019-12-06 PROCEDURE — 83036 HEMOGLOBIN GLYCOSYLATED A1C: CPT

## 2019-12-06 PROCEDURE — 96375 TX/PRO/DX INJ NEW DRUG ADDON: CPT

## 2019-12-06 PROCEDURE — 96365 THER/PROPH/DIAG IV INF INIT: CPT

## 2019-12-06 PROCEDURE — 99283 EMERGENCY DEPT VISIT LOW MDM: CPT | Performed by: PHYSICIAN ASSISTANT

## 2019-12-06 PROCEDURE — 80061 LIPID PANEL: CPT

## 2019-12-06 RX ORDER — MAGNESIUM SULFATE HEPTAHYDRATE 40 MG/ML
2 INJECTION, SOLUTION INTRAVENOUS ONCE
Status: COMPLETED | OUTPATIENT
Start: 2019-12-06 | End: 2019-12-06

## 2019-12-06 RX ORDER — KETOROLAC TROMETHAMINE 30 MG/ML
15 INJECTION, SOLUTION INTRAMUSCULAR; INTRAVENOUS ONCE
Status: COMPLETED | OUTPATIENT
Start: 2019-12-06 | End: 2019-12-06

## 2019-12-06 RX ORDER — METOCLOPRAMIDE HYDROCHLORIDE 5 MG/ML
10 INJECTION INTRAMUSCULAR; INTRAVENOUS ONCE
Status: COMPLETED | OUTPATIENT
Start: 2019-12-06 | End: 2019-12-06

## 2019-12-06 RX ORDER — DIPHENHYDRAMINE HYDROCHLORIDE 50 MG/ML
25 INJECTION INTRAMUSCULAR; INTRAVENOUS ONCE
Status: COMPLETED | OUTPATIENT
Start: 2019-12-06 | End: 2019-12-06

## 2019-12-06 RX ADMIN — KETOROLAC TROMETHAMINE 15 MG: 30 INJECTION, SOLUTION INTRAMUSCULAR at 20:10

## 2019-12-06 RX ADMIN — METOCLOPRAMIDE 10 MG: 5 INJECTION, SOLUTION INTRAMUSCULAR; INTRAVENOUS at 20:11

## 2019-12-06 RX ADMIN — SODIUM CHLORIDE 1000 ML: 0.9 INJECTION, SOLUTION INTRAVENOUS at 20:10

## 2019-12-06 RX ADMIN — MAGNESIUM SULFATE HEPTAHYDRATE 2 G: 40 INJECTION, SOLUTION INTRAVENOUS at 20:13

## 2019-12-06 RX ADMIN — DIPHENHYDRAMINE HYDROCHLORIDE 25 MG: 50 INJECTION INTRAMUSCULAR; INTRAVENOUS at 20:11

## 2019-12-07 LAB
EST. AVERAGE GLUCOSE BLD GHB EST-MCNC: 108 MG/DL
HBA1C MFR BLD: 5.4 % (ref 4.2–6.3)

## 2019-12-07 NOTE — ED NOTES
Patient reports that her headache is completely gone now  Dr Suraj Gamboa made aware        Mellissa Kulkarni RN  12/06/19 9623

## 2019-12-07 NOTE — ED PROVIDER NOTES
History  Chief Complaint   Patient presents with    Migraine     Pt states she started with a migraine around 1400 today with photosensitivity and nausea  Took tylenol w/o relief  Has hx of migraines but rescue meds are   Gave birth about 3 weeks ago and is breastfeeding  The patient presents to the emergency department today for evaluation of a headache  She is alert orient x4 she does not appear acutely toxic  She is very well-appearing stating at 1400 hours today she began experiencing a left-sided frontal to parietal headache  This is nontraumatic not associated with fever or neck stiffness  She has a history of migraines in the past   She feels though the symptoms are similar  She experiencing some nausea without vomiting  Also experiencing photophobia  No novel symptoms associated with past migraines  There is an NSAID allergy however this is secondary to gastric sleeve  We will proceed with standard migraine cocktail  Prior to Admission Medications   Prescriptions Last Dose Informant Patient Reported? Taking?    Cholecalciferol (REPLESTA PO)   Yes Yes   Sig: Take 10,000 Units by mouth once a week    Prenatal Vit-Fe Fumarate-FA (PRENATAL 1+1 PO)   Yes Yes   Sig: Take by mouth   QUEtiapine (SEROquel) 50 mg tablet Not Taking at Unknown time  No No   Sig: Take 1 tablet (50 mg total) by mouth daily at bedtime   Patient not taking: Reported on 2019   SUMAtriptan Succinate 6 MG/0 5ML SOAJ Not Taking at Unknown time  No No   Sig: Inject 0 5mL at onset of headache and may repeat in 2 hours, MAX 2 inject a day MAX 3 days/week   Patient not taking: Reported on 2019   cyanocobalamin 1,000 mcg/mL   No Yes   Sig: Inject IM every 30 days x 6 months   etanercept (ENBREL SURECLICK) 50 MG/ML injection Not Taking at Unknown time  Yes No   Sig: Inject under the skin   famotidine (PEPCID) 20 mg tablet   Yes No   Sig: Take 20 mg by mouth 2 (two) times a day   levonorgestrel-ethinyl estradiol (AVIANE,ALESSE,LESSINA) 0 1-20 MG-MCG per tablet Not Taking at Unknown time  Yes No   Sig: Take 1 tablet by mouth daily     tiZANidine (ZANAFLEX) 4 mg tablet Not Taking at Unknown time  Yes No   Sig: Take 1 tablet by mouth 3 (three) times a day as needed      Facility-Administered Medications: None       Past Medical History:   Diagnosis Date    Abnormal LFTs (liver function tests)     LAST ASSESSED: [id number]    Adalimumab (Humira) long-term use     LAST ASSESSED: [id number]    Affective disorder (HCC)     LAST ASSESSED: [id number]    Anxiety     Aspiration into airway     LAST ASSESSED: 20VLU9597    Benign neoplasm of pituitary gland ([facility])     LAST ASSESSED: [id number]    Chondromalacia, patella     LAST ASSESSED: [id number]    Common migraine without aura     LAST ASSESSED: [id number]    Controlled type 2 diabetes mellitus with diabetic nephropathy, without long-term current use of insulin ([facility])     LAST ASSESSED: 25XQD7110    Depression with anxiety     Diabetes mellitus ([facility])     LAST ASSESSED: 31DQN4009    Elevated C-reactive protein     LAST ASSESSED: 80YZM0915    Exertional dyspnea     LAST ASSESSED: [id number]    Hemicrania continua     LAST ASSESSED: 55GDA6207    High protein C activity level     LAST ASSESSED: 88VGU2689    Hyperprolactinemia ([facility])     LAST ASSESSED: 26WTG3869    Hypertension     Long-term use of hydroxychloroquine     LAST ASSESSED: 28VZP7367    Migraine     LAST ASSESSED: 06WRH2855    Polyarthritis     LAST ASSESSED: 52QNH1284    Polycystic ovarian disease     Prediabetes     LAST ASSESSED: 99PQM0724    Rheumatoid arthritis ([facility])     LAST ASSESSED: 08ATJ8888    Seronegative rheumatoid arthritis ([facility])     LAST ASSESSED: 84AEL2502    TMJ (dislocation of temporomandibular joint)     APPEARANCE LAST ASSESSED: 10OSH7647    Trochanteric bursitis of right hip     LAST ASSESSED: 14GLV4691    Vitamin D deficiency     LAST ASSESSED: 56UQP6009       Past Surgical History:   Procedure Laterality Date    DENTAL SURGERY      GASTRECTOMY      SLEEVE RESOLVED: 66GYM3592    NASAL SEPTUM SURGERY      DEVIATION REPAIR    NASAL SEPTUM SURGERY      TONSILLECTOMY      TONSILLECTOMY      TOOTH EXTRACTION      WISDOM TOOTH EXTRACTION      WISDOM TOOTH EXTRACTION         Family History   Problem Relation Age of Onset    Arthritis Mother     Psoriasis Mother     No Known Problems Father     Lung disease Maternal Grandfather         BLACK    Diabetes Paternal Grandmother     Cancer Paternal Grandfather     Coronary artery disease Family     Colon cancer Family     Diabetes Family      I have reviewed and agree with the history as documented  Social History     Tobacco Use    Smoking status: Never Smoker    Smokeless tobacco: Never Used   Substance Use Topics    Alcohol use: Not Currently     Comment: OCCASIONAL - 1 DRINK/MONTH AS PER [employer]SCRIPTS    Drug use: No        Review of Systems   Constitutional: Negative  Negative for chills and fever  HENT: Negative  Negative for sore throat and trouble swallowing  Eyes: Positive for photophobia  Respiratory: Negative  Negative for cough, shortness of breath and wheezing  Cardiovascular: Negative  Negative for chest pain and leg swelling  Gastrointestinal: Positive for nausea  Negative for abdominal pain, blood in stool and vomiting  Endocrine: Negative  Genitourinary: Negative  Musculoskeletal: Negative  Negative for neck stiffness  Skin: Negative  Allergic/Immunologic: Negative  Neurological: Positive for headaches  Negative for dizziness, seizures, speech difficulty, weakness, light-headedness and numbness  Hematological: Negative  Psychiatric/Behavioral: Negative  All other systems reviewed and are negative  Physical Exam  Physical Exam   Constitutional: She is oriented to person, place, and time  Vital signs are normal  She appears well-developed and well-nourished   She does not have a sickly appearance  She does not appear ill  No distress  HENT:   Right Ear: External ear normal  No swelling  Tympanic membrane is not bulging  Left Ear: External ear normal  No swelling  Tympanic membrane is not bulging  Nose: Nose normal    Mouth/Throat: Oropharynx is clear and moist  No oropharyngeal exudate  Eyes: Pupils are equal, round, and reactive to light  Conjunctivae, EOM and lids are normal    Neck: Normal range of motion  Neck supple  No JVD present  No tracheal deviation, no edema and normal range of motion present  No thyromegaly present  Cardiovascular: Normal rate, regular rhythm, normal heart sounds, intact distal pulses and normal pulses  Exam reveals no gallop and no friction rub  No murmur heard  Pulmonary/Chest: Effort normal and breath sounds normal  No stridor  No respiratory distress  She has no wheezes  She has no rales  She exhibits no tenderness  Abdominal: Soft  Bowel sounds are normal  She exhibits no distension and no mass  There is no tenderness  There is no rebound, no guarding and negative Severino's sign  No hernia  Musculoskeletal: Normal range of motion  She exhibits no edema or tenderness  Lymphadenopathy:     She has no cervical adenopathy  Neurological: She is alert and oriented to person, place, and time  She has normal strength and normal reflexes  No cranial nerve deficit or sensory deficit  GCS eye subscore is 4  GCS verbal subscore is 5  GCS motor subscore is 6  Negative nuchal rigidity  Negative Kernig's and Brudzinski's test    Skin: Skin is warm and dry  Capillary refill takes less than 2 seconds  No rash noted  She is not diaphoretic  No erythema  No pallor  Psychiatric: She has a normal mood and affect  Her speech is normal and behavior is normal    Vitals reviewed        Vital Signs  ED Triage Vitals [12/06/19 1932]   Temperature Pulse Respirations Blood Pressure SpO2   97 5 °F (36 4 °C) 85 22 123/72 96 %      Temp Source Heart Rate Source Patient Position - Orthostatic VS BP Location FiO2 (%)   Temporal Monitor Lying Left arm --      Pain Score       6           Vitals:    12/06/19 1932   BP: 123/72   Pulse: 85   Patient Position - Orthostatic VS: Lying         Visual Acuity  Visual Acuity      Most Recent Value   L Pupil Size (mm)  3   R Pupil Size (mm)  3          ED Medications  Medications   sodium chloride 0 9 % bolus 1,000 mL (0 mL Intravenous Stopped 12/6/19 2107)   magnesium sulfate 2 g/50 mL IVPB (premix) 2 g (0 g Intravenous Stopped 12/6/19 2107)   diphenhydrAMINE (BENADRYL) injection 25 mg (25 mg Intravenous Given 12/6/19 2011)   metoclopramide (REGLAN) injection 10 mg (10 mg Intravenous Given 12/6/19 2011)   ketorolac (TORADOL) injection 15 mg (15 mg Intravenous Given 12/6/19 2010)       Diagnostic Studies  Results Reviewed     None                 No orders to display              Procedures  Procedures         ED Course  ED Course as of Dec 06 2130   [facility] Dec 06, 2019   1944 Blood Pressure: 123/72   1944 Temperature: 97 5 °F (36 4 °C)   1944 Pulse: 85   1944 Respirations: 22   1944 SpO2: 96 %   2127 Patient was re-evaluated again at 2125 hours  She was sleeping comfortably in the room  She states that her headache has completely resolved 0/10 and ready for discharge  MDM      Disposition  Final diagnoses:   Migraine     Time reflects when diagnosis was documented in both MDM as applicable and the Disposition within this note     Time User Action Codes Description Comment    12/6/2019  9:27 PM Fernando Sanz Add [[id number]] Migraine       ED Disposition     ED Disposition Condition Date/Time Comment    Discharge Stable Fri Dec 6, 2019  9:27 PM [address] discharge to home/self care              Follow-up Information     Follow up With Specialties Details Why Contact Info    Fabiola Danielson DO Family Medicine Schedule an appointment as soon as possible for a visit  As needed [address]  [address]  [address]  [phone]            Patient's Medications   Discharge Prescriptions    No medications on file     No discharge procedures on file      ED Provider  Electronically Signed by           Cammie Krishna PA-C  12/06/19 1739

## 2019-12-08 DIAGNOSIS — G43.109 MIGRAINE WITH AURA AND WITHOUT STATUS MIGRAINOSUS, NOT INTRACTABLE: ICD-10-CM

## 2019-12-09 RX ORDER — CEFUROXIME AXETIL 250 MG/1
TABLET ORAL
Qty: 9 CARTRIDGE | Refills: 0 | OUTPATIENT
Start: 2019-12-09

## 2020-01-14 ENCOUNTER — HOSPITAL ENCOUNTER (EMERGENCY)
Facility: HOSPITAL | Age: 35
Discharge: HOME | End: 2020-01-14
Attending: EMERGENCY MEDICINE | Admitting: EMERGENCY MEDICINE
Payer: COMMERCIAL

## 2020-01-14 VITALS
DIASTOLIC BLOOD PRESSURE: 74 MMHG | WEIGHT: 212.3 LBS | TEMPERATURE: 98.6 F | RESPIRATION RATE: 16 BRPM | HEIGHT: 65 IN | HEART RATE: 70 BPM | OXYGEN SATURATION: 96 % | SYSTOLIC BLOOD PRESSURE: 122 MMHG | BODY MASS INDEX: 35.37 KG/M2

## 2020-01-14 DIAGNOSIS — G43.109 MIGRAINE WITH AURA AND WITHOUT STATUS MIGRAINOSUS, NOT INTRACTABLE: ICD-10-CM

## 2020-01-14 DIAGNOSIS — G43.109 MIGRAINE WITH AURA AND WITHOUT STATUS MIGRAINOSUS, NOT INTRACTABLE: Primary | ICD-10-CM

## 2020-01-14 LAB
HOLD SPECIMEN: NORMAL
HOLD SPECIMEN: NORMAL

## 2020-01-14 PROCEDURE — 96375 TX/PRO/DX INJ NEW DRUG ADDON: CPT

## 2020-01-14 PROCEDURE — 99283 EMERGENCY DEPT VISIT LOW MDM: CPT

## 2020-01-14 PROCEDURE — 96374 THER/PROPH/DIAG INJ IV PUSH: CPT

## 2020-01-14 PROCEDURE — 96361 HYDRATE IV INFUSION ADD-ON: CPT

## 2020-01-14 PROCEDURE — 99284 EMERGENCY DEPT VISIT MOD MDM: CPT | Performed by: EMERGENCY MEDICINE

## 2020-01-14 RX ORDER — METOCLOPRAMIDE HYDROCHLORIDE 5 MG/ML
10 INJECTION INTRAMUSCULAR; INTRAVENOUS ONCE
Status: COMPLETED | OUTPATIENT
Start: 2020-01-14 | End: 2020-01-14

## 2020-01-14 RX ORDER — CEFUROXIME AXETIL 250 MG/1
TABLET ORAL
Qty: 9 CARTRIDGE | Refills: 3 | Status: SHIPPED | OUTPATIENT
Start: 2020-01-14 | End: 2020-04-22 | Stop reason: SDUPTHER

## 2020-01-14 RX ORDER — DIPHENHYDRAMINE HYDROCHLORIDE 50 MG/ML
25 INJECTION INTRAMUSCULAR; INTRAVENOUS ONCE
Status: COMPLETED | OUTPATIENT
Start: 2020-01-14 | End: 2020-01-14

## 2020-01-14 RX ORDER — CEFUROXIME AXETIL 250 MG/1
TABLET ORAL
Qty: 9 CARTRIDGE | Refills: 2 | Status: CANCELLED | OUTPATIENT
Start: 2020-01-14

## 2020-01-14 RX ADMIN — SODIUM CHLORIDE 1000 ML: 0.9 INJECTION, SOLUTION INTRAVENOUS at 17:30

## 2020-01-14 RX ADMIN — METOCLOPRAMIDE 10 MG: 5 INJECTION, SOLUTION INTRAMUSCULAR; INTRAVENOUS at 17:32

## 2020-01-14 RX ADMIN — SODIUM CHLORIDE 1000 ML: 0.9 INJECTION, SOLUTION INTRAVENOUS at 17:29

## 2020-01-14 RX ADMIN — DIPHENHYDRAMINE HYDROCHLORIDE 25 MG: 50 INJECTION, SOLUTION INTRAMUSCULAR; INTRAVENOUS at 17:31

## 2020-01-15 NOTE — ED PROVIDER NOTES
History  Chief Complaint   Patient presents with    Headache - Recurrent or Known Dx Migraines     Patient is having migraines since giving birth to her son, called PCP for imitrex and they never returned her call  HPI     Pt presents from home, hx of migraine headaches, recently delivered a child in Nov, presents with her typical migraine headache, left frontal and behind her eye, throbbing, constant for several hours, worse with bright lights and sounds, and currently present  Pt did not have a script for her imitrex, and so she was not able to take a dose  Pt has nausea and no vomiting  Pt denies fevers, cough, cp, sob, v/d/c, abd pain, dysuria, focal def or syncope  Prior to Admission Medications   Prescriptions Last Dose Informant Patient Reported? Taking? Cholecalciferol (REPLESTA PO)   Yes No   Sig: Take 10,000 Units by mouth once a week    Prenatal Vit-Fe Fumarate-FA (PRENATAL 1+1 PO)   Yes No   Sig: Take by mouth   QUEtiapine (SEROquel) 50 mg tablet   No No   Sig: Take 1 tablet (50 mg total) by mouth daily at bedtime   Patient not taking: Reported on 11/12/2019   SUMAtriptan Succinate 6 MG/0 5ML SOAJ   No No   Sig: Inject 0 5mL at onset of headache and may repeat in 2 hours, MAX 2 inject a day MAX 3 days/week   Patient not taking: Reported on 11/12/2019   SUMAtriptan Succinate 6 MG/0 5ML SOAJ   No No   Sig: Inject 0 5mL at onset of headache and may repeat in 2 hours, MAX 2 inject a day MAX 3 days/week   cyanocobalamin 1,000 mcg/mL   No No   Sig: Inject IM every 30 days x 6 months   etanercept (ENBREL SURECLICK) 50 MG/ML injection   Yes No   Sig: Inject under the skin   famotidine (PEPCID) 20 mg tablet   Yes No   Sig: Take 20 mg by mouth 2 (two) times a day   levonorgestrel-ethinyl estradiol (AVIANE,ALESSE,LESSINA) 0 1-20 MG-MCG per tablet   Yes No   Sig: Take 1 tablet by mouth daily     tiZANidine (ZANAFLEX) 4 mg tablet   Yes No   Sig: Take 1 tablet by mouth 3 (three) times a day as needed Facility-Administered Medications: None       Past Medical History:   Diagnosis Date    Abnormal LFTs (liver function tests)     LAST ASSESSED: [id number]    Adalimumab (Humira) long-term use     LAST ASSESSED: [id number]    Affective disorder ([facility])     LAST ASSESSED: 21OZK9676    Anxiety     Aspiration into airway     LAST ASSESSED: 30KUB0109    Benign neoplasm of pituitary gland ([facility])     LAST ASSESSED: 62LFQ8632    Chondromalacia, patella     LAST ASSESSED: 42ROS2950    Common migraine without aura     LAST ASSESSED: 05EJG4098    Depression with anxiety     Elevated C-reactive protein     LAST ASSESSED: 39FKS0853    Exertional dyspnea     LAST ASSESSED: 01BKT6384    Hemicrania continua     LAST ASSESSED: 97EYF7278    High protein C activity level     LAST ASSESSED: 70ZNC3659    Hyperprolactinemia ([facility])     LAST ASSESSED: 95JNP9827    Hypertension     Long-term use of hydroxychloroquine     LAST ASSESSED: 25JNQ9738    Migraine     LAST ASSESSED: 16HOA3161    Polyarthritis     LAST ASSESSED: 13MMP0542    Polycystic ovarian disease     Prediabetes     LAST ASSESSED: 37KRH8851    Rheumatoid arthritis ([facility])     LAST ASSESSED: 03RXY3654    Seronegative rheumatoid arthritis ([facility])     LAST ASSESSED: 83GRL3332    TMJ (dislocation of temporomandibular joint)     APPEARANCE LAST ASSESSED: 73CIJ4154    Trochanteric bursitis of right hip     LAST ASSESSED: 09XBB8335    Vitamin D deficiency     LAST ASSESSED: 75XIF0606       Past Surgical History:   Procedure Laterality Date    DENTAL SURGERY      GASTRECTOMY      SLEEVE RESOLVED: [id number]    NASAL SEPTUM SURGERY      DEVIATION REPAIR    NASAL SEPTUM SURGERY      TONSILLECTOMY      TONSILLECTOMY      TOOTH EXTRACTION      WISDOM TOOTH EXTRACTION      WISDOM TOOTH EXTRACTION         Family History   Problem Relation Age of Onset    Arthritis Mother     Psoriasis Mother     No Known Problems Father     Lung disease Maternal Grandfather BLACK    Diabetes Paternal Grandmother     Cancer Paternal Grandfather     Coronary artery disease Family     Colon cancer Family     Diabetes Family      I have reviewed and agree with the history as documented  Social History     Tobacco Use    Smoking status: Never Smoker    Smokeless tobacco: Never Used   Substance Use Topics    Alcohol use: Not Currently     Comment: OCCASIONAL - 1 DRINK/MONTH AS PER ALLSCRIPTS    Drug use: No        Review of Systems   Constitutional: Negative for activity change, appetite change, diaphoresis, fatigue and fever  HENT: Negative for congestion, facial swelling, mouth sores and trouble swallowing  Eyes: Negative for photophobia, discharge and visual disturbance  Respiratory: Negative for apnea, cough, shortness of breath and wheezing  Cardiovascular: Negative for chest pain and leg swelling  Gastrointestinal: Positive for nausea  Negative for abdominal pain, constipation, diarrhea and vomiting  Endocrine: Negative for heat intolerance and polydipsia  Genitourinary: Negative for dysuria, flank pain, frequency and hematuria  Musculoskeletal: Negative for back pain, gait problem, myalgias and neck pain  Skin: Negative for rash and wound  Allergic/Immunologic: Negative for immunocompromised state  Neurological: Positive for headaches  Negative for dizziness, syncope, weakness and light-headedness  Hematological: Negative for adenopathy  Psychiatric/Behavioral: Negative for agitation, confusion and self-injury  The patient is not nervous/anxious  Physical Exam  Physical Exam   Constitutional: She is oriented to person, place, and time  She appears well-developed and well-nourished  No distress  Pt lying in a dark room due to the headache  However, she o/w is A&Ox3, pleasant and calm  HENT:   Head: Normocephalic and atraumatic     Right Ear: External ear normal    Left Ear: External ear normal    Nose: Nose normal    Mouth/Throat: Oropharynx is clear and moist  No oropharyngeal exudate  Eyes: Pupils are equal, round, and reactive to light  Conjunctivae and EOM are normal  Right eye exhibits no discharge  Left eye exhibits no discharge  No scleral icterus  Neck: Normal range of motion  Neck supple  No JVD present  No tracheal deviation present  No thyromegaly present  Cardiovascular: Normal rate, regular rhythm and normal heart sounds  No murmur heard  Pulmonary/Chest: Effort normal and breath sounds normal  No stridor  No respiratory distress  She has no wheezes  She has no rales  She exhibits no tenderness  Abdominal: Soft  Bowel sounds are normal  She exhibits no distension and no mass  There is no tenderness  There is no rebound and no guarding  Musculoskeletal: Normal range of motion  She exhibits no edema, tenderness or deformity  Lymphadenopathy:     She has no cervical adenopathy  Neurological: She is alert and oriented to person, place, and time  She has normal reflexes  She displays normal reflexes  No cranial nerve deficit  She exhibits normal muscle tone  Coordination normal    Skin: Skin is warm and dry  No rash noted  She is not diaphoretic  No erythema  No pallor  Psychiatric: She has a normal mood and affect  Her behavior is normal  Judgment and thought content normal    Nursing note and vitals reviewed        Vital Signs  ED Triage Vitals [01/14/20 1644]   Temperature Pulse Respirations Blood Pressure SpO2   98 6 °F (37 °C) 83 18 125/84 97 %      Temp Source Heart Rate Source Patient Position - Orthostatic VS BP Location FiO2 (%)   Temporal Monitor Lying Right arm --      Pain Score       6           Vitals:    01/14/20 1700 01/14/20 1730 01/14/20 1800 01/14/20 1830   BP: 108/73 133/79 131/87 122/74   Pulse: 60 77 60 70   Patient Position - Orthostatic VS: Sitting Lying Lying Lying         Visual Acuity  Visual Acuity      Most Recent Value   L Pupil Size (mm)  3   R Pupil Size (mm)  3          ED Medications  Medications   sodium chloride 0 9 % bolus 1,000 mL (0 mL Intravenous Stopped 1/14/20 1846)   sodium chloride 0 9 % bolus 1,000 mL (0 mL Intravenous Stopped 1/14/20 1926)   metoclopramide (REGLAN) injection 10 mg (10 mg Intravenous Given 1/14/20 1732)   diphenhydrAMINE (BENADRYL) injection 25 mg (25 mg Intravenous Given 1/14/20 1731)       Diagnostic Studies  Results Reviewed     Procedure Component Value Units Date/Time    [place] draw [[id number]] Collected:  01/14/20 1733    Lab Status:  Final result Specimen:  Blood from Arm, Right Updated:  01/14/20 1901    Narrative: The following orders were created for panel order [place] draw  Procedure                               Abnormality         Status                     ---------                               -----------         ------                     Jayda Fang / Yellow tube on FPMA[[id number]]                      Final result               Lavender Top 3 ml on VHKU[[id number]]                        Final result                 Please view results for these tests on the individual orders  No orders to display              Procedures  Procedures         ED Course                               MDM  Number of Diagnoses or Management Options  Migraine with aura and without status migrainosus, not intractable:   Diagnosis management comments: IMP: migraine headache with her typical symptoms and unable to take her abortive therapy  Doubt meningitis, ICH, bacteremia  Plan: Give ivf, iv reglan and iv ketorolac  Pt can f/up w/ her pcp         Amount and/or Complexity of Data Reviewed  Tests in the medicine section of CPT®: ordered and reviewed  Decide to obtain previous medical records or to obtain history from someone other than the patient: yes  Review and summarize past medical records: yes  Independent visualization of images, tracings, or specimens: yes    Risk of Complications, Morbidity, and/or Mortality  Presenting problems: high  Diagnostic procedures: low  Management options: low    Patient Progress  Patient progress: improved        Disposition  Final diagnoses:   Migraine with aura and without status migrainosus, not intractable     Time reflects when diagnosis was documented in both MDM as applicable and the Disposition within this note     Time User Action Codes Description Comment    1/14/2020  7:15 PM Romy Sesay Add [G43 109] Migraine with aura and without status migrainosus, not intractable     1/14/2020  7:18 PM Romy Job Modify [G43 109] Migraine with aura and without status migrainosus, not intractable       ED Disposition     ED Disposition Condition Date/Time Comment    Discharge Stable Tue Jan 14, 2020  7:13 PM [address] discharge to home/self care  Follow-up Information     Follow up With Specialties Details Why Contact Gloria Butt,  Family Medicine Schedule an appointment as soon as possible for a visit in 2 days As needed  Return immediately, If symptoms worsen [facility]  [phone]            Discharge Medication List as of 1/14/2020  7:17 PM      CONTINUE these medications which have NOT CHANGED    Details   Cholecalciferol (REPLESTA PO) Take 10,000 Units by mouth once a week , Historical Med      cyanocobalamin 1,000 mcg/mL Inject IM every 30 days x 6 months, Normal      etanercept (ENBREL SURECLICK) 50 MG/ML injection Inject under the skin, Starting Fri 8/12/2016, Historical Med      famotidine (PEPCID) 20 mg tablet Take 20 mg by mouth 2 (two) times a day, Historical Med      levonorgestrel-ethinyl estradiol (AVIANE,ALESSE,LESSINA) 0 1-20 MG-MCG per tablet Take 1 tablet by mouth daily  , Until Discontinued, Historical Med      Prenatal Vit-Fe Fumarate-FA (PRENATAL 1+1 PO) Take by mouth, Historical Med      QUEtiapine (SEROquel) 50 mg tablet Take 1 tablet (50 mg total) by mouth daily at bedtime, Starting Mon 1/28/2019, Normal tiZANidine (ZANAFLEX) 4 mg tablet Take 1 tablet by mouth 3 (three) times a day as needed, Starting Fri 1/13/2017, Historical Med      SUMAtriptan Succinate 6 MG/0 5ML SOAJ Inject 0 5mL at onset of headache and may repeat in 2 hours, MAX 2 inject a day MAX 3 days/week, Normal           No discharge procedures on file      ED Provider  Electronically Signed by           Erin Murdock DO  01/14/20 2943

## 2020-03-15 ENCOUNTER — OFFICE VISIT (OUTPATIENT)
Dept: URGENT CARE | Facility: CLINIC | Age: 35
End: 2020-03-15
Payer: COMMERCIAL

## 2020-03-15 VITALS
OXYGEN SATURATION: 96 % | DIASTOLIC BLOOD PRESSURE: 89 MMHG | HEART RATE: 103 BPM | TEMPERATURE: 98.6 F | SYSTOLIC BLOOD PRESSURE: 137 MMHG | BODY MASS INDEX: 35.65 KG/M2 | HEIGHT: 65 IN | WEIGHT: 214 LBS | RESPIRATION RATE: 18 BRPM

## 2020-03-15 DIAGNOSIS — J02.9 SORETHROAT: Primary | ICD-10-CM

## 2020-03-15 LAB — S PYO AG THROAT QL: NEGATIVE

## 2020-03-15 PROCEDURE — S9088 SERVICES PROVIDED IN URGENT: HCPCS | Performed by: PHYSICIAN ASSISTANT

## 2020-03-15 PROCEDURE — 99213 OFFICE O/P EST LOW 20 MIN: CPT | Performed by: PHYSICIAN ASSISTANT

## 2020-03-15 PROCEDURE — 87070 CULTURE OTHR SPECIMN AEROBIC: CPT | Performed by: PHYSICIAN ASSISTANT

## 2020-03-15 PROCEDURE — 87880 STREP A ASSAY W/OPTIC: CPT | Performed by: PHYSICIAN ASSISTANT

## 2020-03-15 NOTE — PROGRESS NOTES
[facility]        NAME: Regla Cazares is a 27 y o  female  : 1985    MRN: [id number]  DATE: March 15, 2020  TIME: 9:20 AM    Assessment and Plan   Sorethroat [J02 9]  1  Sorethroat  POCT rapid strepA    Throat culture         Patient Instructions     Start a nonsedating antihistamine to drive the postnasal drip which may help the sore throat  Follow up with PCP in 3-5 days  Proceed to  ER if symptoms worsen  Chief Complaint     Chief Complaint   Patient presents with    Sore Throat     cough x 2 weeks          History of Present Illness       Patient presents with a 2 week history of sore throat and intermittent dry cough  Patient states she had a cold which she got her son and those symptoms dissipated but the sore throat never resolved  There are no fever chills shortness of breath  She denies any recent travel foreign or domestic or any exposure to covid-19  Review of Systems   Review of Systems   Constitutional: Negative for chills and fever  HENT: Positive for postnasal drip and sore throat  Negative for congestion  Respiratory: Positive for cough  Negative for chest tightness, shortness of breath and wheezing  Cardiovascular: Negative for chest pain  Gastrointestinal: Negative for nausea and vomiting  Musculoskeletal: Negative for myalgias  Skin: Negative for rash  Neurological: Negative for headaches  Hematological: Negative for adenopathy  Current Medications       Current Outpatient Medications:     Cholecalciferol (REPLESTA PO), Take 10,000 Units by mouth once a week , Disp: , Rfl:     cyanocobalamin 1,000 mcg/mL, Inject IM every 30 days x 6 months, Disp: 1 mL, Rfl: 5    levonorgestrel-ethinyl estradiol (AVIANE,ALESSE,LESSINA) 0 1-20 MG-MCG per tablet, Take 1 tablet by mouth daily  , Disp: , Rfl:     Prenatal Vit-Fe Fumarate-FA (PRENATAL 1+1 PO), Take by mouth, Disp: , Rfl:     SUMAtriptan Succinate 6 MG/0 5ML SOAJ, Inject 0 5mL at onset of headache and may repeat in 2 hours, MAX 2 inject a day MAX 3 days/week, Disp: 9 Cartridge, Rfl: 3    Current Allergies     Allergies as of 03/15/2020 - Reviewed 03/15/2020   Allergen Reaction Noted    Augmentin [amoxicillin-pot clavulanate] GI Intolerance and Vomiting 05/28/2015    Nsaids  04/17/2017    Pollen extract  05/12/2004            The following portions of the patient's history were reviewed and updated as appropriate: allergies, current medications, past family history, past medical history, past social history, past surgical history and problem list      Past Medical History:   Diagnosis Date    Abnormal LFTs (liver function tests)     LAST ASSESSED: [id number]    Adalimumab (Humira) long-term use     LAST ASSESSED: 82ELM1488    Affective disorder ([address] )     LAST ASSESSED: 39BPO6255    Anxiety     Aspiration into airway     LAST ASSESSED: 00RPJ1310    Benign neoplasm of pituitary gland ([facility])     LAST ASSESSED: 79MAT1214    Chondromalacia, patella     LAST ASSESSED: 58RPX4166    Common migraine without aura     LAST ASSESSED: 62YIN9514    Depression with anxiety     Elevated C-reactive protein     LAST ASSESSED: 06YSK3221    Exertional dyspnea     LAST ASSESSED: 91CGL2850    Hemicrania continua     LAST ASSESSED: 00YOG5174    High protein C activity level     LAST ASSESSED: 77SMJ1969    Hyperprolactinemia (HCC)     LAST ASSESSED: 12WIC3202    Hypertension     Long-term use of hydroxychloroquine     LAST ASSESSED: 88JFI6133    Migraine     LAST ASSESSED: 17NOV2015    Polyarthritis     LAST ASSESSED: 33YPF5022    Polycystic ovarian disease     Prediabetes     LAST ASSESSED: 95EQJ9469    Rheumatoid arthritis (HCC)     LAST ASSESSED: 62AUQ8961    Seronegative rheumatoid arthritis ([facility]CC)     LAST ASSESSED: 26SGD2475    TMJ (dislocation of temporomandibular joint)     APPEARANCE LAST ASSESSED: 18ZYY6444    Trochanteric bursitis of right hip     LAST ASSESSED: 89MZK0686    Vitamin D deficiency     LAST ASSESSED: 51XMT5039       Past Surgical History:   Procedure Laterality Date    DENTAL SURGERY      GASTRECTOMY      SLEEVE RESOLVED: 37QRA8428    NASAL SEPTUM SURGERY      DEVIATION REPAIR    NASAL SEPTUM SURGERY      TONSILLECTOMY      TONSILLECTOMY      TOOTH EXTRACTION      WISDOM TOOTH EXTRACTION      WISDOM TOOTH EXTRACTION         Family History   Problem Relation Age of Onset    Arthritis Mother     Psoriasis Mother     No Known Problems Father     Lung disease Maternal Grandfather         BLACK    Diabetes Paternal Grandmother     Cancer Paternal Grandfather     Coronary artery disease Family     Colon cancer Family     Diabetes Family          Medications have been verified  Objective   /89   Pulse 103   Temp 98 6 °F (37 °C)   Resp 18   Ht 5' 5" (1 651 m)   Wt 97 1 kg (214 lb)   SpO2 96%   Breastfeeding No   BMI 35 61 kg/m²        Physical Exam     Physical Exam   Constitutional: She is oriented to person, place, and time  She appears well-developed and well-nourished  HENT:   Head: Normocephalic and atraumatic  Right Ear: Tympanic membrane and ear canal normal    Left Ear: Tympanic membrane and ear canal normal    Mouth/Throat: Uvula is midline and mucous membranes are normal  No uvula swelling  Mild erythema of the posterior pharynx no exudates airway patent  Neck: Normal range of motion  Neck supple  Cardiovascular: Normal rate and normal heart sounds  Pulmonary/Chest: Effort normal and breath sounds normal    Lymphadenopathy:     She has no cervical adenopathy  Neurological: She is alert and oriented to person, place, and time  Skin: Skin is warm and dry  Psychiatric: She has a normal mood and affect  Her behavior is normal    Nursing note and vitals reviewed

## 2020-03-15 NOTE — PATIENT INSTRUCTIONS
Start a nonsedating antihistamine to drive the postnasal drip which may help the sore throat  Pharyngitis   AMBULATORY CARE:   Pharyngitis , or sore throat, is inflammation of the tissues and structures in your pharynx (throat)  Pharyngitis is most often caused by bacteria  It may also be caused by a cold or flu virus  Other causes include smoking, allergies, or acid reflux  Signs and symptoms that may occur with pharyngitis:   · Sore throat or pain when you swallow    · Fever, chills, and body aches    · Hoarse or raspy voice    · Cough, runny or stuffy nose, itchy or watery eyes    · Headache    · Upset stomach and loss of appetite    · Mild neck stiffness    · Swollen glands that feel like hard lumps when you touch your neck    · White and yellow pus-filled blisters in the back of your throat  Call 911 for any of the following:   · You have trouble breathing or swallowing because your throat is swollen or sore  Seek care immediately if:   · You are drooling because it hurts too much to swallow  · Your fever is higher than 102? F (39?C) or lasts longer than 3 days  · You are confused  · You taste blood in your throat  Contact your healthcare provider if:   · Your throat pain gets worse  · You have a painful lump in your throat that does not go away after 5 days  · Your symptoms do not improve after 5 days  · You have questions or concerns about your condition or care  Treatment for pharyngitis:  Viral pharyngitis will go away on its own without treatment  Your sore throat should start to feel better in 3 to 5 days for both viral and bacterial infections  You may need any of the following:  · Antibiotics  treat a bacterial infection  · NSAIDs , such as ibuprofen, help decrease swelling, pain, and fever  NSAIDs can cause stomach bleeding or kidney problems in certain people  If you take blood thinner medicine, always ask your healthcare provider if NSAIDs are safe for you   Always read the medicine label and follow directions  · Acetaminophen  decreases pain and fever  It is available without a doctor's order  Ask how much to take and how often to take it  Follow directions  Acetaminophen can cause liver damage if not taken correctly  Manage your symptoms:   · Gargle salt water  Mix ¼ teaspoon salt in an 8 ounce glass of warm water and gargle  This may help decrease swelling in your throat  · Drink liquids as directed  You may need to drink more liquids than usual  Liquids may help soothe your throat and prevent dehydration  Ask how much liquid to drink each day and which liquids are best for you  · Use a cool-steam humidifier  to help moisten the air in your room and calm your cough  · Soothe your throat  with cough drops, ice, soft foods, or popsicles  Prevent the spread of pharyngitis:  Cover your mouth and nose when you cough or sneeze  Do not share food or drinks  Wash your hands often  Use soap and water  If soap and water are unavailable, use an alcohol based hand   Follow up with your healthcare provider as directed:  Write down your questions so you remember to ask them during your visits  © 2017 [employer]  Information is for End User's use only and may not be sold, redistributed or otherwise used for commercial purposes  All illustrations and images included in CareNotes® are the copyrighted property of [employer]  or [employer]  The above information is an  only  It is not intended as medical advice for individual conditions or treatments  Talk to your doctor, nurse or pharmacist before following any medical regimen to see if it is safe and effective for you

## 2020-03-17 LAB — BACTERIA THROAT CULT: NORMAL

## 2020-03-20 ENCOUNTER — OFFICE VISIT (OUTPATIENT)
Dept: FAMILY MEDICINE CLINIC | Facility: CLINIC | Age: 35
End: 2020-03-20
Payer: COMMERCIAL

## 2020-03-20 ENCOUNTER — LAB (OUTPATIENT)
Dept: LAB | Facility: MEDICAL CENTER | Age: 35
End: 2020-03-20
Payer: COMMERCIAL

## 2020-03-20 VITALS
TEMPERATURE: 98.1 F | BODY MASS INDEX: 35.65 KG/M2 | WEIGHT: 214 LBS | SYSTOLIC BLOOD PRESSURE: 122 MMHG | HEIGHT: 65 IN | DIASTOLIC BLOOD PRESSURE: 88 MMHG

## 2020-03-20 DIAGNOSIS — Z13.220 SCREENING FOR HYPERLIPIDEMIA: ICD-10-CM

## 2020-03-20 DIAGNOSIS — Z13.1 SCREENING FOR DIABETES MELLITUS (DM): ICD-10-CM

## 2020-03-20 DIAGNOSIS — J06.9 UPPER RESPIRATORY TRACT INFECTION, UNSPECIFIED TYPE: Primary | ICD-10-CM

## 2020-03-20 DIAGNOSIS — J06.9 UPPER RESPIRATORY TRACT INFECTION, UNSPECIFIED TYPE: ICD-10-CM

## 2020-03-20 DIAGNOSIS — M19.90 ARTHRITIS: ICD-10-CM

## 2020-03-20 LAB
BASOPHILS # BLD AUTO: 0.05 THOUSANDS/ΜL (ref 0–0.1)
BASOPHILS NFR BLD AUTO: 1 % (ref 0–1)
CHOLEST SERPL-MCNC: 210 MG/DL (ref 50–200)
CRP SERPL QL: 5 MG/L
EOSINOPHIL # BLD AUTO: 0.19 THOUSAND/ΜL (ref 0–0.61)
EOSINOPHIL NFR BLD AUTO: 2 % (ref 0–6)
ERYTHROCYTE [DISTWIDTH] IN BLOOD BY AUTOMATED COUNT: 13.1 % (ref 11.6–15.1)
EST. AVERAGE GLUCOSE BLD GHB EST-MCNC: 105 MG/DL
FLUAV RNA NPH QL NAA+PROBE: NORMAL
FLUBV RNA NPH QL NAA+PROBE: NORMAL
HBA1C MFR BLD: 5.3 %
HCT VFR BLD AUTO: 44.9 % (ref 34.8–46.1)
HDLC SERPL-MCNC: 28 MG/DL
HGB BLD-MCNC: 14.7 G/DL (ref 11.5–15.4)
IMM GRANULOCYTES # BLD AUTO: 0.03 THOUSAND/UL (ref 0–0.2)
IMM GRANULOCYTES NFR BLD AUTO: 0 % (ref 0–2)
LDLC SERPL CALC-MCNC: 135 MG/DL (ref 0–100)
LYMPHOCYTES # BLD AUTO: 2.85 THOUSANDS/ΜL (ref 0.6–4.47)
LYMPHOCYTES NFR BLD AUTO: 30 % (ref 14–44)
MCH RBC QN AUTO: 29.5 PG (ref 26.8–34.3)
MCHC RBC AUTO-ENTMCNC: 32.7 G/DL (ref 31.4–37.4)
MCV RBC AUTO: 90 FL (ref 82–98)
MONOCYTES # BLD AUTO: 0.73 THOUSAND/ΜL (ref 0.17–1.22)
MONOCYTES NFR BLD AUTO: 8 % (ref 4–12)
NEUTROPHILS # BLD AUTO: 5.66 THOUSANDS/ΜL (ref 1.85–7.62)
NEUTS SEG NFR BLD AUTO: 59 % (ref 43–75)
NONHDLC SERPL-MCNC: 182 MG/DL
NRBC BLD AUTO-RTO: 0 /100 WBCS
PLATELET # BLD AUTO: 353 THOUSANDS/UL (ref 149–390)
PMV BLD AUTO: 10.9 FL (ref 8.9–12.7)
RBC # BLD AUTO: 4.99 MILLION/UL (ref 3.81–5.12)
RSV RNA NPH QL NAA+PROBE: NORMAL
TRIGL SERPL-MCNC: 235 MG/DL
WBC # BLD AUTO: 9.51 THOUSAND/UL (ref 4.31–10.16)

## 2020-03-20 PROCEDURE — 85025 COMPLETE CBC W/AUTO DIFF WBC: CPT

## 2020-03-20 PROCEDURE — 3074F SYST BP LT 130 MM HG: CPT | Performed by: FAMILY MEDICINE

## 2020-03-20 PROCEDURE — 80061 LIPID PANEL: CPT | Performed by: FAMILY MEDICINE

## 2020-03-20 PROCEDURE — 36415 COLL VENOUS BLD VENIPUNCTURE: CPT

## 2020-03-20 PROCEDURE — 1036F TOBACCO NON-USER: CPT | Performed by: FAMILY MEDICINE

## 2020-03-20 PROCEDURE — 86140 C-REACTIVE PROTEIN: CPT

## 2020-03-20 PROCEDURE — 3079F DIAST BP 80-89 MM HG: CPT | Performed by: FAMILY MEDICINE

## 2020-03-20 PROCEDURE — 87631 RESP VIRUS 3-5 TARGETS: CPT

## 2020-03-20 PROCEDURE — 99213 OFFICE O/P EST LOW 20 MIN: CPT | Performed by: FAMILY MEDICINE

## 2020-03-20 PROCEDURE — 83036 HEMOGLOBIN GLYCOSYLATED A1C: CPT

## 2020-03-20 NOTE — RESULT ENCOUNTER NOTE
Call patient to notify normal results hemoglobin A1c 5 3 very good C reactive protein is 5 0 normals are less than 3

## 2020-03-20 NOTE — RESULT ENCOUNTER NOTE
Call patient to notify normal results white blood count is absolutely normal white count is 9000 hemoglobin 14 so everything looks good it may be an early virus because the lymphocytes are slightly elevated I probably will not see the nasal swab until Monday

## 2020-03-20 NOTE — PROGRESS NOTES
BMI Counseling: Body mass index is 35 61 kg/m²  The BMI is above normal  Nutrition recommendations include decreasing portion sizes, encouraging healthy choices of fruits and vegetables, decreasing fast food intake, consuming healthier snacks, moderation in carbohydrate intake and increasing intake of lean protein  Exercise recommendations include exercising 3-5 times per week  No pharmacotherapy was ordered  Patient referred to PCP due to patient being overweight  Assessment/Plan:    Problem List Items Addressed This Visit     None      Visit Diagnoses     Upper respiratory tract infection, unspecified type    -  Primary           Diagnoses and all orders for this visit:    Upper respiratory tract infection, unspecified type        No problem-specific Assessment & Plan notes found for this encounter  Subjective:      Patient ID: Olivier Ambrosio is a 27 y o  female      Saurabh Citizen is here today on she actually had scheduled this visit for her well visit and needs her HbA1c and her lipid profile to satisfy her requirements however she has developed respiratory symptoms she has a  3month-old who has had the usual viral head cold symptoms she took the baby to the pediatrician who noted that this is viral she though has been coughing for literally a month and not bringing up any per a persistent purulence mucus has a scratchy throat no high fever no travel to hot spots for [place] visited no known contact with cold of it but 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 no shortness of breath currently does not need any guidelines for coded swabbing but would benefit from influenza and RSV swab Ng I am suspicious that maybe her her son has a possible RSV      The following portions of the patient's history were reviewed and updated as appropriate:   She has a past medical history of Abnormal LFTs (liver function tests), Adalimumab (Humira) long-term use, Affective disorder ([facility] [address] ), Anxiety, Aspiration into airway, Benign neoplasm of pituitary gland (HCC), Chondromalacia, patella, Common migraine without aura, Depression with anxiety, Elevated C-reactive protein, Exertional dyspnea, Hemicrania continua, High protein C activity level, Hyperprolactinemia (HCC), Hypertension, Long-term use of hydroxychloroquine, Migraine, Polyarthritis, Polycystic ovarian disease, Prediabetes, Rheumatoid arthritis ([facility] [address] ), Seronegative rheumatoid arthritis ([facility] [address] ), TMJ (dislocation of temporomandibular joint), Trochanteric bursitis of right hip, and Vitamin D deficiency  ,  does not have any pertinent problems on file  ,   has a past surgical history that includes [place] tooth extraction; Tonsillectomy; Nasal septum surgery; [place] tooth extraction; Nasal septum surgery; Tonsillectomy; Dental surgery; Gastrectomy; and Tooth extraction  ,  family history includes Arthritis in her mother; Cancer in her paternal grandfather; Colon cancer in her family; Coronary artery disease in her family; Diabetes in her family and paternal grandmother; Lung disease in her maternal grandfather; No Known Problems in her father; Psoriasis in her mother  ,   reports that she has never smoked  She has never used smokeless tobacco  She reports that she drank alcohol  She reports that she does not use drugs  ,  is allergic to augmentin [amoxicillin-pot clavulanate]; nsaids; and pollen extract     Current Outpatient Medications   Medication Sig Dispense Refill    Cholecalciferol (REPLESTA PO) Take 10,000 Units by mouth once a week       cyanocobalamin 1,000 mcg/mL Inject IM every 30 days x 6 months 1 mL 5    levonorgestrel-ethinyl estradiol (AVIANE,ALESSE,LESSINA) 0 1-20 MG-MCG per tablet Take 1 tablet by mouth daily        Prenatal Vit-Fe Fumarate-FA (PRENATAL 1+1 PO) Take by mouth      SUMAtriptan Succinate 6 MG/0 5ML SOAJ Inject 0 5mL at onset of headache and may repeat in 2 hours, MAX 2 inject a day MAX 3 days/week 9 Cartridge 3     No current facility-administered medications for this visit  Review of Systems   Constitutional: Negative for activity change, appetite change, diaphoresis, fatigue and fever  HENT: Positive for sinus pressure and sinus pain  Eyes: Negative  Respiratory: Negative for apnea, cough, chest tightness, shortness of breath and wheezing  Cardiovascular: Negative for chest pain, palpitations and leg swelling  Gastrointestinal: Negative for abdominal distention, abdominal pain, anal bleeding, constipation, diarrhea, nausea and vomiting  Endocrine: Negative for cold intolerance, heat intolerance, polydipsia, polyphagia and polyuria  Genitourinary: Negative for difficulty urinating, dysuria, flank pain, hematuria and urgency  Musculoskeletal: Negative for arthralgias, back pain, gait problem, joint swelling and myalgias  Skin: Negative for color change, rash and wound  Allergic/Immunologic: Negative for environmental allergies, food allergies and immunocompromised state  Neurological: Negative for dizziness, seizures, syncope, speech difficulty, numbness and headaches  Hematological: Negative for adenopathy  Does not bruise/bleed easily  Psychiatric/Behavioral: Negative for agitation, behavioral problems, hallucinations, sleep disturbance and suicidal ideas  Objective:  Vitals:    03/20/20 0724   BP: 122/88   BP Location: Left arm   Patient Position: Sitting   Cuff Size: Standard   Temp: 98 1 °F (36 7 °C)   TempSrc: Tympanic   Weight: 97 1 kg (214 lb)   Height: 5' 5" (1 651 m)     Body mass index is 35 61 kg/m²  Physical Exam   Constitutional: She is oriented to person, place, and time  She appears well-developed and well-nourished  No distress  HENT:   Head: Normocephalic  Right Ear: External ear normal    Left Ear: External ear normal    Mouth/Throat: Oropharyngeal exudate present     Patient has hyperemia of the nasal mucosa with hypertrophy the terminates posterior pharynx has postnasal drip and some cobblestoning but no seth erythema   Eyes: Pupils are equal, round, and reactive to light  Conjunctivae and EOM are normal  Right eye exhibits no discharge  Left eye exhibits no discharge  No scleral icterus  Neck: Normal range of motion  No tracheal deviation present  No thyromegaly present  Cardiovascular: Normal rate, regular rhythm and normal heart sounds  Exam reveals no gallop and no friction rub  No murmur heard  Pulmonary/Chest: Effort normal and breath sounds normal  No respiratory distress  She has no wheezes  Abdominal: Soft  Bowel sounds are normal  She exhibits no mass  There is no tenderness  There is no guarding  Musculoskeletal: She exhibits no edema or deformity  Lymphadenopathy:     She has no cervical adenopathy  Neurological: She is alert and oriented to person, place, and time  No cranial nerve deficit  Skin: Skin is warm and dry  No rash noted  She is not diaphoretic  No erythema  Psychiatric: She has a normal mood and affect   Thought content normal  Denies alcohol use

## 2020-04-20 ENCOUNTER — TELEMEDICINE (OUTPATIENT)
Dept: FAMILY MEDICINE CLINIC | Facility: CLINIC | Age: 35
End: 2020-04-20
Payer: COMMERCIAL

## 2020-04-20 VITALS — WEIGHT: 214 LBS | BODY MASS INDEX: 35.65 KG/M2 | HEIGHT: 65 IN

## 2020-04-20 DIAGNOSIS — F41.1 ANXIETY AS ACUTE REACTION TO GROSS STRESS: Primary | ICD-10-CM

## 2020-04-20 DIAGNOSIS — F43.0 ANXIETY AS ACUTE REACTION TO GROSS STRESS: Primary | ICD-10-CM

## 2020-04-20 PROCEDURE — 99214 OFFICE O/P EST MOD 30 MIN: CPT | Performed by: FAMILY MEDICINE

## 2020-04-20 RX ORDER — CLONAZEPAM 1 MG/1
TABLET, ORALLY DISINTEGRATING ORAL
Qty: 45 TABLET | Refills: 0 | Status: SHIPPED | OUTPATIENT
Start: 2020-04-20 | End: 2020-04-27

## 2020-04-22 DIAGNOSIS — G43.109 MIGRAINE WITH AURA AND WITHOUT STATUS MIGRAINOSUS, NOT INTRACTABLE: ICD-10-CM

## 2020-04-22 RX ORDER — CEFUROXIME AXETIL 250 MG/1
TABLET ORAL
Qty: 9 CARTRIDGE | Refills: 3 | Status: SHIPPED | OUTPATIENT
Start: 2020-04-22 | End: 2022-02-14 | Stop reason: SDUPTHER

## 2020-04-27 DIAGNOSIS — F41.9 ANXIETY: Primary | ICD-10-CM

## 2020-04-27 DIAGNOSIS — G43.109 MIGRAINE WITH AURA AND WITHOUT STATUS MIGRAINOSUS, NOT INTRACTABLE: ICD-10-CM

## 2020-04-27 DIAGNOSIS — G43.809 OTHER MIGRAINE WITHOUT STATUS MIGRAINOSUS, NOT INTRACTABLE: Primary | ICD-10-CM

## 2020-04-27 RX ORDER — CLONAZEPAM 1 MG/1
TABLET ORAL
Qty: 45 TABLET | Refills: 0 | Status: SHIPPED | OUTPATIENT
Start: 2020-04-27 | End: 2020-05-22 | Stop reason: SDUPTHER

## 2020-04-27 RX ORDER — PEN NEEDLE, DIABETIC 29 G X1/2"
NEEDLE, DISPOSABLE MISCELLANEOUS DAILY PRN
Qty: 2 EACH | Refills: 1 | Status: SHIPPED | OUTPATIENT
Start: 2020-04-27 | End: 2020-08-14 | Stop reason: ALTCHOICE

## 2020-05-22 DIAGNOSIS — F41.9 ANXIETY: ICD-10-CM

## 2020-05-22 RX ORDER — CLONAZEPAM 1 MG/1
TABLET ORAL
Qty: 45 TABLET | Refills: 0 | Status: SHIPPED | OUTPATIENT
Start: 2020-05-22 | End: 2020-07-24 | Stop reason: SDUPTHER

## 2020-06-19 DIAGNOSIS — M53.3 SACRAL BACK PAIN: Primary | ICD-10-CM

## 2020-06-21 ENCOUNTER — HOSPITAL ENCOUNTER (OUTPATIENT)
Dept: RADIOLOGY | Facility: HOSPITAL | Age: 35
Discharge: HOME | End: 2020-06-21
Attending: FAMILY MEDICINE
Payer: COMMERCIAL

## 2020-06-21 DIAGNOSIS — M53.3 SACRAL BACK PAIN: ICD-10-CM

## 2020-06-21 PROCEDURE — 72220 X-RAY EXAM SACRUM TAILBONE: CPT

## 2020-06-21 PROCEDURE — 72110 X-RAY EXAM L-2 SPINE 4/>VWS: CPT

## 2020-07-24 DIAGNOSIS — F41.9 ANXIETY: ICD-10-CM

## 2020-07-24 RX ORDER — CLONAZEPAM 1 MG/1
TABLET ORAL
Qty: 45 TABLET | Refills: 0 | Status: SHIPPED | OUTPATIENT
Start: 2020-07-24 | End: 2020-08-14

## 2020-08-14 ENCOUNTER — OFFICE VISIT (OUTPATIENT)
Dept: FAMILY MEDICINE CLINIC | Facility: CLINIC | Age: 35
End: 2020-08-14
Payer: COMMERCIAL

## 2020-08-14 VITALS
HEIGHT: 65 IN | DIASTOLIC BLOOD PRESSURE: 86 MMHG | BODY MASS INDEX: 35.62 KG/M2 | WEIGHT: 213.8 LBS | TEMPERATURE: 97.3 F | SYSTOLIC BLOOD PRESSURE: 124 MMHG

## 2020-08-14 DIAGNOSIS — G43.109 MIGRAINE WITH AURA AND WITHOUT STATUS MIGRAINOSUS, NOT INTRACTABLE: ICD-10-CM

## 2020-08-14 DIAGNOSIS — K21.00 REFLUX ESOPHAGITIS: ICD-10-CM

## 2020-08-14 DIAGNOSIS — D35.2 PITUITARY MICROADENOMA (HCC): ICD-10-CM

## 2020-08-14 DIAGNOSIS — F41.9 ANXIETY: ICD-10-CM

## 2020-08-14 DIAGNOSIS — M06.059 RHEUMATOID ARTHRITIS INVOLVING HIP WITH NEGATIVE RHEUMATOID FACTOR, UNSPECIFIED LATERALITY (HCC): Primary | ICD-10-CM

## 2020-08-14 DIAGNOSIS — Z00.00 ANNUAL PHYSICAL EXAM: ICD-10-CM

## 2020-08-14 DIAGNOSIS — E63.9 NUTRITIONAL DEFICIENCY: ICD-10-CM

## 2020-08-14 PROCEDURE — 99395 PREV VISIT EST AGE 18-39: CPT | Performed by: FAMILY MEDICINE

## 2020-08-14 PROCEDURE — 3008F BODY MASS INDEX DOCD: CPT | Performed by: FAMILY MEDICINE

## 2020-08-14 PROCEDURE — 1036F TOBACCO NON-USER: CPT | Performed by: FAMILY MEDICINE

## 2020-08-14 PROCEDURE — 3044F HG A1C LEVEL LT 7.0%: CPT | Performed by: FAMILY MEDICINE

## 2020-08-14 RX ORDER — CLONAZEPAM 1 MG/1
TABLET ORAL
Qty: 60 TABLET | Refills: 0 | Status: SHIPPED | OUTPATIENT
Start: 2020-08-14 | End: 2020-09-23 | Stop reason: SDUPTHER

## 2020-08-14 RX ORDER — PANTOPRAZOLE SODIUM 40 MG/1
40 TABLET, DELAYED RELEASE ORAL DAILY
Qty: 30 TABLET | Refills: 0 | Status: SHIPPED | OUTPATIENT
Start: 2020-08-14 | End: 2021-08-04

## 2020-08-14 RX ORDER — PEN NEEDLE, DIABETIC 29 G X1/2"
NEEDLE, DISPOSABLE MISCELLANEOUS DAILY PRN
Qty: 2 EACH | Refills: 5 | Status: SHIPPED | OUTPATIENT
Start: 2020-08-14 | End: 2021-08-04

## 2020-08-14 NOTE — PATIENT INSTRUCTIONS
Wellness Visit for Adults   AMBULATORY CARE:   A wellness visit  is when you see your healthcare provider to get screened for health problems  You can also get advice on how to stay healthy  Write down your questions so you remember to ask them  Ask your healthcare provider how often you should have a wellness visit  What happens at a wellness visit:  Your healthcare provider will ask about your health, and your family history of health problems  This includes high blood pressure, heart disease, and cancer  He or she will ask if you have symptoms that concern you, if you smoke, and about your mood  You may also be asked about your intake of medicines, supplements, food, and alcohol  Any of the following may be done:  · Your weight  will be checked  Your height may also be checked so your body mass index (BMI) can be calculated  Your BMI shows if you are at a healthy weight  · Your blood pressure  and heart rate will be checked  Your temperature may also be checked  · Blood and urine tests  may be done  Blood tests may be done to check your cholesterol levels  Abnormal cholesterol levels increase your risk for heart disease and stroke  You may also need a blood or urine test to check for diabetes if you are at increased risk  Urine tests may be done to look for signs of an infection or kidney disease  · A physical exam  includes checking your heartbeat and lungs with a stethoscope  Your healthcare provider may also check your skin to look for sun damage  · Screening tests  may be recommended  A screening test is done to check for diseases that may not cause symptoms  The screening tests you may need depend on your age, gender, family history, and lifestyle habits  For example, colorectal screening may be recommended if you are 48years old or older  Screening tests you need if you are a woman:   · A Pap smear  is used to screen for cervical cancer   Pap smears are usually done every 3 to 5 years depending on your age  You may need them more often if you have had abnormal Pap smear test results in the past  Ask your healthcare provider how often you should have a Pap smear  · A mammogram  is an x-ray of your breasts to screen for breast cancer  Experts recommend mammograms every 2 years starting at age 48 years  You may need a mammogram at age 52 years or younger if you have an increased risk for breast cancer  Talk to your healthcare provider about when you should start having mammograms and how often you need them  Vaccines you may need:   · Get an influenza vaccine  every year  The influenza vaccine protects you from the flu  Several types of viruses cause the flu  The viruses change over time, so new vaccines are made each year  · Get a tetanus-diphtheria (Td) booster vaccine  every 10 years  This vaccine protects you against tetanus and diphtheria  Tetanus is a severe infection that may cause painful muscle spasms and lockjaw  Diphtheria is a severe bacterial infection that causes a thick covering in the back of your mouth and throat  · Get a human papillomavirus (HPV) vaccine  if you are female and aged 23 to 32 or male 23 to 24 and never received it  This vaccine protects you from HPV infection  HPV is the most common infection spread by sexual contact  HPV may also cause vaginal, penile, and anal cancers  · Get a pneumococcal vaccine  if you are aged 72 years or older  The pneumococcal vaccine is an injection given to protect you from pneumococcal disease  Pneumococcal disease is an infection caused by pneumococcal bacteria  The infection may cause pneumonia, meningitis, or an ear infection  · Get a shingles vaccine  if you are aged 61 or older, even if you have had shingles before  The shingles vaccine is an injection to protect you from the varicella-zoster virus  This is the same virus that causes chickenpox   Shingles is a painful rash that develops in people who had chickenpox or have been exposed to the virus  How to eat healthy:  My Plate is a model for planning healthy meals  It shows the types and amounts of foods that should go on your plate  Fruits and vegetables make up about half of your plate, and grains and protein make up the other half  A serving of dairy is included on the side of your plate  The amount of calories and serving sizes you need depends on your age, gender, weight, and height  Examples of healthy foods are listed below:  · Eat a variety of vegetables  such as dark green, red, and orange vegetables  You can also include canned vegetables low in sodium (salt) and frozen vegetables without added butter or sauces  · Eat a variety of fresh fruits , canned fruit in 100% juice, frozen fruit, and dried fruit  · Include whole grains  At least half of the grains you eat should be whole grains  Examples include whole-wheat bread, wheat pasta, brown rice, and whole-grain cereals such as oatmeal     · Eat a variety of protein foods such as seafood (fish and shellfish), lean meat, and poultry without skin (turkey and chicken)  Examples of lean meats include pork leg, shoulder, or tenderloin, and beef round, sirloin, tenderloin, and extra lean ground beef  Other protein foods include eggs and egg substitutes, beans, peas, soy products, nuts, and seeds  · Choose low-fat dairy products such as skim or 1% milk or low-fat yogurt, cheese, and cottage cheese  · Limit unhealthy fats  such as butter, hard margarine, and shortening  Exercise:  Exercise at least 30 minutes per day on most days of the week  Some examples of exercise include walking, biking, dancing, and swimming  You can also fit in more physical activity by taking the stairs instead of the elevator or parking farther away from stores  Include muscle strengthening activities 2 days each week  Regular exercise provides many health benefits   It helps you manage your weight, and decreases your risk for type 2 diabetes, heart disease, stroke, and high blood pressure  Exercise can also help improve your mood  Ask your healthcare provider about the best exercise plan for you  General health and safety guidelines:   · Do not smoke  Nicotine and other chemicals in cigarettes and cigars can cause lung damage  Ask your healthcare provider for information if you currently smoke and need help to quit  E-cigarettes or smokeless tobacco still contain nicotine  Talk to your healthcare provider before you use these products  · Limit alcohol  A drink of alcohol is 12 ounces of beer, 5 ounces of wine, or 1½ ounces of liquor  · Lose weight, if needed  Being overweight increases your risk of certain health conditions  These include heart disease, high blood pressure, type 2 diabetes, and certain types of cancer  · Protect your skin  Do not sunbathe or use tanning beds  Use sunscreen with a SPF 15 or higher  Apply sunscreen at least 15 minutes before you go outside  Reapply sunscreen every 2 hours  Wear protective clothing, hats, and sunglasses when you are outside  · Drive safely  Always wear your seatbelt  Make sure everyone in your car wears a seatbelt  A seatbelt can save your life if you are in an accident  Do not use your cell phone when you are driving  This could distract you and cause an accident  Pull over if you need to make a call or send a text message  · Practice safe sex  Use latex condoms if are sexually active and have more than one partner  Your healthcare provider may recommend screening tests for sexually transmitted infections (STIs)  · Wear helmets, lifejackets, and protective gear  Always wear a helmet when you ride a bike or motorcycle, go skiing, or play sports that could cause a head injury  Wear protective equipment when you play sports  Wear a lifejacket when you are on a boat or doing water sports    © 2017 [employer] Information is for End User's use only and may not be sold, redistributed or otherwise used for commercial purposes  All illustrations and images included in CareNotes® are the copyrighted property of [employer]  or [employer]  The above information is an  only  It is not intended as medical advice for individual conditions or treatments  Talk to your doctor, nurse or pharmacist before following any medical regimen to see if it is safe and effective for you  Weight Management   AMBULATORY CARE:   Why it is important to manage your weight:  Being overweight increases your risk of health conditions such as heart disease, high blood pressure, type 2 diabetes, and certain types of cancer  It can also increase your risk for osteoarthritis, sleep apnea, and other respiratory problems  Aim for a slow, steady weight loss  Even a small amount of weight loss can lower your risk of health problems  How to lose weight safely:  A safe and healthy way to lose weight is to eat fewer calories and get regular exercise  You can lose up about 1 pound a week by decreasing the number of calories you eat by 500 calories each day  You can decrease calories by eating smaller portion sizes or by cutting out high-calorie foods  Read labels to find out how many calories are in the foods you eat  You can also burn calories with exercise such as walking, swimming, or biking  You will be more likely to keep weight off if you make these changes part of your lifestyle  Healthy meal plan for weight management:  A healthy meal plan includes a variety of foods, contains fewer calories, and helps you stay healthy  A healthy meal plan includes the following:  · Eat whole-grain foods more often  A healthy meal plan should contain fiber  Fiber is the part of grains, fruits, and vegetables that is not broken down by your body  Whole-grain foods are healthy and provide extra fiber in your diet   Some examples of whole-grain foods are whole-wheat breads and pastas, oatmeal, brown rice, and bulgur  · Eat a variety of vegetables every day  Include dark, leafy greens such as spinach, kale, saul greens, and mustard greens  Eat yellow and orange vegetables such as carrots, sweet potatoes, and winter squash  · Eat a variety of fruits every day  Choose fresh or canned fruit (canned in its own juice or light syrup) instead of juice  Fruit juice has very little or no fiber  · Eat low-fat dairy foods  Drink fat-free (skim) milk or 1% milk  Eat fat-free yogurt and low-fat cottage cheese  Try low-fat cheeses such as mozzarella and other reduced-fat cheeses  · Choose meat and other protein foods that are low in fat  Choose beans or other legumes such as split peas or lentils  Choose fish, skinless poultry (chicken or turkey), or lean cuts of red meat (beef or pork)  Before you cook meat or poultry, cut off any visible fat  · Use less fat and oil  Try baking foods instead of frying them  Add less fat, such as margarine, sour cream, regular salad dressing and mayonnaise to foods  Eat fewer high-fat foods  Some examples of high-fat foods include french fries, doughnuts, ice cream, and cakes  · Eat fewer sweets  Limit foods and drinks that are high in sugar  This includes candy, cookies, regular soda, and sweetened drinks  Ways to decrease calories:   · Eat smaller portions  ¨ Use a small plate with smaller servings  ¨ Do not eat second helpings  ¨ When you eat at a restaurant, ask for a box and place half of your meal in the box before you eat  ¨ Share an entrée with someone else  · Replace high-calorie snacks with healthy, low-calorie snacks  ¨ Choose fresh fruit, vegetables, fat-free rice cakes, or air-popped popcorn instead of potato chips, nuts, or chocolate  ¨ Choose water or calorie-free drinks instead of soda or sweetened drinks  · Eat regular meals  Skipping meals can lead to overeating later in the day   Eat a healthy snack in place of a meal if you do not have time to eat a regular meal      · Do not shop for groceries when you are hungry  You may be more likely to make unhealthy food choices  Take a grocery list of healthy foods and shop after you have eaten  Exercise:  Exercise at least 30 minutes per day on most days of the week  Some examples of exercise include walking, biking, dancing, and swimming  You can also fit in more physical activity by taking the stairs instead of the elevator or parking farther away from stores  Ask your healthcare provider about the best exercise plan for you  Other things to consider as you try to lose weight:   · Be aware of situations that may give you the urge to overeat, such as eating while watching television  Find ways to avoid these situations  For example, read a book, go for a walk, or do crafts  · Meet with a weight loss support group or friends who are also trying to lose weight  This may help you stay motivated to continue working on your weight loss goals  © 2017 [employer] Information is for End User's use only and may not be sold, redistributed or otherwise used for commercial purposes  All illustrations and images included in CareNotes® are the copyrighted property of [employer]  or [employer]  The above information is an  only  It is not intended as medical advice for individual conditions or treatments  Talk to your doctor, nurse or pharmacist before following any medical regimen to see if it is safe and effective for you

## 2020-08-14 NOTE — PROGRESS NOTES
ADULT ANNUAL [address] [facility]     NAME: Akshat Me  AGE: 27 y o  SEX: female  : 1985     DATE: 2020     Assessment and Plan:     Problem List Items Addressed This Visit     None          Immunizations and preventive care screenings were discussed with patient today  Appropriate education was printed on patient's after visit summary  Counseling:  Alcohol/drug use: discussed moderation in alcohol intake, the recommendations for healthy alcohol use, and avoidance of illicit drug use  Dental Health: discussed importance of regular tooth brushing, flossing, and dental visits  Injury prevention: discussed safety/seat belts, safety helmets, smoke detectors, carbon dioxide detectors, and smoking near bedding or upholstery  Sexual health: discussed sexually transmitted diseases, partner selection, use of condoms, avoidance of unintended pregnancy, and contraceptive alternatives  · Exercise: the importance of regular exercise/physical activity was discussed  Recommend exercise 3-5 times per week for at least 30 minutes  No follow-ups on file  Chief Complaint:     Chief Complaint   Patient presents with    Annual Exam      History of Present Illness:     Adult Annual Physical   Patient here for a comprehensive physical exam  The patient reports no problems  Diet and Physical Activity  · Diet/Nutrition: well balanced diet  · Exercise: walking  Depression Screening  PHQ-9 Depression Screening    PHQ-9:    Frequency of the following problems over the past two weeks:            General Health  · Sleep: gets 4-6 hours of sleep on average  · Hearing: normal - bilateral   · Vision: wears glasses  · Dental: regular dental visits  /GYN Health  · Last menstrual period:  2020  · Contraceptive method: oral contraceptives    · History of STDs?: no      Review of Systems:     Review of Systems   Constitutional: Negative for activity change, appetite change, diaphoresis, fatigue and fever  HENT: Positive for dental problem and hearing loss  Eyes: Positive for visual disturbance  Patient wears corrective lenses   Respiratory: Negative for apnea, cough, chest tightness, shortness of breath and wheezing  Cardiovascular: Negative for chest pain, palpitations and leg swelling  Gastrointestinal: Negative for abdominal distention, abdominal pain, anal bleeding, constipation, diarrhea, nausea and vomiting  Patient experiencing gastroesophageal reflux disease she has had a gastric sleeve in the past has been taking some NSAIDs for control of a headache disorder will treat with Protonix for a month but if symptoms persist will need EGD   Endocrine: Negative for cold intolerance, heat intolerance, polydipsia, polyphagia and polyuria  Genitourinary: Negative for difficulty urinating, dysuria, flank pain, hematuria and urgency  Very mild female stress incontinence   Musculoskeletal: Positive for arthralgias  Negative for back pain, gait problem, joint swelling and myalgias  Skin: Negative for color change, rash and wound  Allergic/Immunologic: Negative for environmental allergies, food allergies and immunocompromised state  Neurological: Positive for headaches  Negative for dizziness, seizures, syncope, speech difficulty and numbness  Patient experiencing migraine cephalgia about once per month she has other common headaches due to mask wearing which are easily abated with Tylenol or NSAIDs she uses an auto injector of Imitrex for her migraines and she is satisfied with her migraine control   Hematological: Negative for adenopathy  Does not bruise/bleed easily  Psychiatric/Behavioral: Negative for agitation, behavioral problems, hallucinations, sleep disturbance and suicidal ideas        Past Medical History:     Past Medical History:   Diagnosis Date    Abnormal LFTs (liver function tests)     LAST ASSESSED: [id number]    Adalimumab (Humira) long-term use     LAST ASSESSED: 52YGF1456    Affective disorder ([facility])     LAST ASSESSED: 25PEE1372    Anxiety     Aspiration into airway     LAST ASSESSED: 45MLR0372    Benign neoplasm of pituitary gland ([facility])     LAST ASSESSED: 66PMW4815    Chondromalacia, patella     LAST ASSESSED: 81SFL9235    Common migraine without aura     LAST ASSESSED: 58GIN1679    Depression with anxiety     Elevated C-reactive protein     LAST ASSESSED: 65HFH4139    Exertional dyspnea     LAST ASSESSED: 88EGU7233    Hemicrania continua     LAST ASSESSED: 31EAA6747    High protein C activity level     LAST ASSESSED: 60JIR9791    Hyperprolactinemia ([facility])     LAST ASSESSED: 35HOO4133    Hypertension     Long-term use of hydroxychloroquine     LAST ASSESSED: 10LJT5165    Migraine     LAST ASSESSED: 26PWS4909    Polyarthritis     LAST ASSESSED: 92EQJ1513    Polycystic ovarian disease     Prediabetes     LAST ASSESSED: 30OBU3423    Rheumatoid arthritis ([facility])     LAST ASSESSED: 10BRK7731    Seronegative rheumatoid arthritis ([facility])     LAST ASSESSED: 20DYF7755    TMJ (dislocation of temporomandibular joint)     APPEARANCE LAST ASSESSED: 36DLQ5721    Trochanteric bursitis of right hip     LAST ASSESSED: 64TJV8657    Vitamin D deficiency     LAST ASSESSED: 74OQR2984      Past Surgical History:     Past Surgical History:   Procedure Laterality Date    DENTAL SURGERY      GASTRECTOMY      SLEEVE RESOLVED: 06EOQ9693    NASAL SEPTUM SURGERY      DEVIATION REPAIR    NASAL SEPTUM SURGERY      TONSILLECTOMY      TONSILLECTOMY      TOOTH EXTRACTION      WISDOM TOOTH EXTRACTION      WISDOM TOOTH EXTRACTION        Social History:        Social History     Socioeconomic History    Marital status: /Civil Union     Spouse name: None    Number of children: None    Years of education: HS OR GED    Highest education level: None   Occupational History    Occupation: CUSTOMER SERVICE     Comment: FULL-TIME EMPLOYMENT   Social Needs    Financial resource strain: None    Food insecurity     Worry: None     Inability: None    Transportation needs     Medical: None     Non-medical: None   Tobacco Use    Smoking status: Never Smoker    Smokeless tobacco: Never Used   Substance and Sexual Activity    Alcohol use: Not Currently     Comment: OCCASIONAL - 1 DRINK/MONTH AS PER ALLSCRIPTS    Drug use: No    Sexual activity: Yes     Partners: Male   Lifestyle    Physical activity     Days per week: None     Minutes per session: None    Stress: None   Relationships    Social connections     Talks on phone: None     Gets together: None     Attends Tenriism service: None     Active member of club or organization: None     Attends meetings of clubs or organizations: None     Relationship status: None    Intimate partner violence     Fear of current or ex partner: None     Emotionally abused: None     Physically abused: None     Forced sexual activity: None   Other Topics Concern    None   Social History Narrative    CAFFEINE USE    PATIENT HAS LIVING WILL      Family History:     Family History   Problem Relation Age of Onset    Arthritis Mother     Psoriasis Mother     No Known Problems Father     Lung disease Maternal Grandfather         BLACK    Diabetes Paternal Grandmother     Cancer Paternal Grandfather     Coronary artery disease Family     Colon cancer Family     Diabetes Family       Current Medications:     Current Outpatient Medications   Medication Sig Dispense Refill    Cholecalciferol (REPLESTA PO) Take 10,000 Units by mouth once a week       clonazePAM (KlonoPIN) 1 mg tablet Take 1/2 tab at lunch time and 1 tablet at hs 45 tablet 0    cyanocobalamin 1,000 mcg/mL Inject IM every 30 days x 6 months 1 mL 5    levonorgestrel-ethinyl estradiol (AVIANE,ALESSE,LESSINA) 0 1-20 MG-MCG per tablet Take 1 tablet by mouth daily        Prenatal Vit-Fe Fumarate-FA (PRENATAL 1+1 PO) Take by mouth      SUMAtriptan Succinate 6 MG/0 5ML SOAJ Inject 0 5mL at onset of headache and may repeat in 2 hours, MAX 2 inject a day MAX 3 days/week 9 Cartridge 3     No current facility-administered medications for this visit  Allergies: Allergies   Allergen Reactions    Augmentin [Amoxicillin-Pot Clavulanate] GI Intolerance and Vomiting     Category: Adverse Reaction;     Nsaids      Other reaction(s): gastric sleeve    Pollen Extract      Other reaction(s): Other (Please comment)  Pt has post nasal drip,sneezing,eyes watery  Physical Exam:     /86 (BP Location: Left arm, Patient Position: Sitting, Cuff Size: Standard)   Temp (!) 97 3 °F (36 3 °C) (Temporal)   Ht 5' 5" (1 651 m)   Wt 97 kg (213 lb 12 8 oz)   BMI 35 58 kg/m²     Physical Exam  Constitutional:       Appearance: She is well-developed  HENT:      Head: Normocephalic and atraumatic  Right Ear: External ear normal       Left Ear: External ear normal       Nose: Nose normal    Eyes:      Conjunctiva/sclera: Conjunctivae normal       Pupils: Pupils are equal, round, and reactive to light  Neck:      Musculoskeletal: Normal range of motion and neck supple  Cardiovascular:      Rate and Rhythm: Normal rate and regular rhythm  Heart sounds: Normal heart sounds  No murmur  No friction rub  Pulmonary:      Effort: Pulmonary effort is normal  No respiratory distress  Breath sounds: Normal breath sounds  No wheezing or rales  Chest:      Chest wall: No tenderness  Abdominal:      General: Bowel sounds are normal       Palpations: Abdomen is soft  Musculoskeletal: Normal range of motion  Skin:     General: Skin is warm and dry  Capillary Refill: Capillary refill takes 2 to 3 seconds  Neurological:      Mental Status: She is alert and oriented to person, place, and time  Psychiatric:         Behavior: Behavior normal          Thought Content:  Thought content normal          Judgment: Judgment normal           Irmahi Kaur DO   [address] [facility]

## 2020-08-23 ENCOUNTER — LAB (OUTPATIENT)
Dept: LAB | Facility: HOSPITAL | Age: 35
End: 2020-08-23
Attending: FAMILY MEDICINE
Payer: COMMERCIAL

## 2020-08-23 DIAGNOSIS — E63.9 NUTRITIONAL DEFICIENCY: ICD-10-CM

## 2020-08-23 DIAGNOSIS — M06.059 RHEUMATOID ARTHRITIS INVOLVING HIP WITH NEGATIVE RHEUMATOID FACTOR, UNSPECIFIED LATERALITY (HCC): ICD-10-CM

## 2020-08-23 DIAGNOSIS — D35.2 PITUITARY MICROADENOMA (HCC): ICD-10-CM

## 2020-08-23 LAB
25(OH)D3 SERPL-MCNC: 19.8 NG/ML (ref 30–100)
ALBUMIN SERPL BCP-MCNC: 4 G/DL (ref 3.5–5)
ALP SERPL-CCNC: 73 U/L (ref 46–116)
ALT SERPL W P-5'-P-CCNC: 18 U/L (ref 12–78)
ANION GAP SERPL CALCULATED.3IONS-SCNC: 7 MMOL/L (ref 4–13)
AST SERPL W P-5'-P-CCNC: 12 U/L (ref 5–45)
BILIRUB SERPL-MCNC: 0.7 MG/DL (ref 0.2–1)
BUN SERPL-MCNC: 10 MG/DL (ref 5–25)
CALCIUM SERPL-MCNC: 9.3 MG/DL (ref 8.3–10.1)
CHLORIDE SERPL-SCNC: 102 MMOL/L (ref 100–108)
CHOLEST SERPL-MCNC: 223 MG/DL (ref 50–200)
CO2 SERPL-SCNC: 28 MMOL/L (ref 21–32)
CREAT SERPL-MCNC: 0.85 MG/DL (ref 0.6–1.3)
CRP SERPL QL: 1.4 MG/L
ERYTHROCYTE [DISTWIDTH] IN BLOOD BY AUTOMATED COUNT: 12.3 % (ref 11.6–15.1)
FOLATE SERPL-MCNC: 13.8 NG/ML (ref 3.1–17.5)
GFR SERPL CREATININE-BSD FRML MDRD: 89 ML/MIN/1.73SQ M
GLUCOSE P FAST SERPL-MCNC: 94 MG/DL (ref 65–99)
HCT VFR BLD AUTO: 44 % (ref 34.8–46.1)
HDLC SERPL-MCNC: 32 MG/DL
HGB BLD-MCNC: 14.5 G/DL (ref 11.5–15.4)
LDLC SERPL CALC-MCNC: 150 MG/DL (ref 0–100)
MCH RBC QN AUTO: 30.1 PG (ref 26.8–34.3)
MCHC RBC AUTO-ENTMCNC: 33 G/DL (ref 31.4–37.4)
MCV RBC AUTO: 92 FL (ref 82–98)
NONHDLC SERPL-MCNC: 191 MG/DL
PLATELET # BLD AUTO: 245 THOUSANDS/UL (ref 149–390)
PMV BLD AUTO: 10.1 FL (ref 8.9–12.7)
POTASSIUM SERPL-SCNC: 4 MMOL/L (ref 3.5–5.3)
PROT SERPL-MCNC: 8.3 G/DL (ref 6.4–8.2)
RBC # BLD AUTO: 4.81 MILLION/UL (ref 3.81–5.12)
SODIUM SERPL-SCNC: 137 MMOL/L (ref 136–145)
TRIGL SERPL-MCNC: 204 MG/DL
TSH SERPL DL<=0.05 MIU/L-ACNC: 0.96 UIU/ML (ref 0.36–3.74)
VIT B12 SERPL-MCNC: 357 PG/ML (ref 100–900)
WBC # BLD AUTO: 8.64 THOUSAND/UL (ref 4.31–10.16)

## 2020-08-23 PROCEDURE — 82746 ASSAY OF FOLIC ACID SERUM: CPT

## 2020-08-23 PROCEDURE — 80053 COMPREHEN METABOLIC PANEL: CPT

## 2020-08-23 PROCEDURE — 82306 VITAMIN D 25 HYDROXY: CPT

## 2020-08-23 PROCEDURE — 82607 VITAMIN B-12: CPT

## 2020-08-23 PROCEDURE — 86430 RHEUMATOID FACTOR TEST QUAL: CPT | Performed by: FAMILY MEDICINE

## 2020-08-23 PROCEDURE — 85027 COMPLETE CBC AUTOMATED: CPT

## 2020-08-23 PROCEDURE — 36415 COLL VENOUS BLD VENIPUNCTURE: CPT | Performed by: FAMILY MEDICINE

## 2020-08-23 PROCEDURE — 84443 ASSAY THYROID STIM HORMONE: CPT

## 2020-08-23 PROCEDURE — 80061 LIPID PANEL: CPT

## 2020-08-23 PROCEDURE — 86140 C-REACTIVE PROTEIN: CPT

## 2020-08-24 LAB — RHEUMATOID FACT SER QL LA: NEGATIVE

## 2020-09-23 DIAGNOSIS — F41.9 ANXIETY: ICD-10-CM

## 2020-09-23 RX ORDER — CLONAZEPAM 1 MG/1
TABLET ORAL
Qty: 60 TABLET | Refills: 0 | Status: SHIPPED | OUTPATIENT
Start: 2020-09-23 | End: 2021-08-04

## 2021-04-13 ENCOUNTER — HOSPITAL ENCOUNTER (EMERGENCY)
Facility: HOSPITAL | Age: 36
Discharge: HOME | End: 2021-04-13
Attending: EMERGENCY MEDICINE | Admitting: EMERGENCY MEDICINE
Payer: COMMERCIAL

## 2021-04-13 VITALS
OXYGEN SATURATION: 96 % | DIASTOLIC BLOOD PRESSURE: 76 MMHG | RESPIRATION RATE: 20 BRPM | HEART RATE: 113 BPM | BODY MASS INDEX: 37.13 KG/M2 | TEMPERATURE: 97.2 F | WEIGHT: 223.11 LBS | SYSTOLIC BLOOD PRESSURE: 119 MMHG

## 2021-04-13 DIAGNOSIS — Z34.93 PREGNANCY WITH ONE FETUS IN THIRD TRIMESTER: ICD-10-CM
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
## 2021-04-13 LAB
BACTERIA UR QL AUTO: ABNORMAL /HPF
BILIRUB UR QL STRIP: NEGATIVE
CLARITY UR: ABNORMAL
COLOR UR: YELLOW
GLUCOSE UR STRIP-MCNC: ABNORMAL MG/DL
HGB UR QL STRIP.AUTO: NEGATIVE
KETONES UR STRIP-MCNC: ABNORMAL MG/DL
LEUKOCYTE ESTERASE UR QL STRIP: ABNORMAL
MUCOUS THREADS UR QL AUTO: ABNORMAL
NITRITE UR QL STRIP: NEGATIVE
NON-SQ EPI CELLS URNS QL MICRO: ABNORMAL /HPF
PH UR STRIP.AUTO: 6.5 [PH]
PROT UR STRIP-MCNC: NEGATIVE MG/DL
RBC #/AREA URNS AUTO: ABNORMAL /HPF
SP GR UR STRIP.AUTO: 1.01 (ref 1–1.03)
UROBILINOGEN UR QL STRIP.AUTO: 1 E.U./DL
WBC #/AREA URNS AUTO: ABNORMAL /HPF

## 2021-04-13 PROCEDURE — 99284 EMERGENCY DEPT VISIT MOD MDM: CPT | Performed by: EMERGENCY MEDICINE

## 2021-04-13 PROCEDURE — 99284 EMERGENCY DEPT VISIT MOD MDM: CPT

## 2021-04-13 PROCEDURE — 87086 URINE CULTURE/COLONY COUNT: CPT | Performed by: EMERGENCY MEDICINE

## 2021-04-13 PROCEDURE — 81001 URINALYSIS AUTO W/SCOPE: CPT | Performed by: EMERGENCY MEDICINE

## 2021-04-13 NOTE — ED PROVIDER NOTES
Emergency Department Trauma Note  Jacqueline Madison 38 y o  female MRN: [id number]  Unit/Bed#: OY52/WC35 Encounter: [id number]      Trauma Alert: Trauma Acuity: Trauma Evaluation  Model of Arrival:   via    Trauma Team: Current Providers  Attending Provider: Samra Izquierdo DO  Registered Nurse: Jayy Sage RN  Registered Nurse: Bina Lyle RN  Consultants: None      History of Present Illness     Chief Complaint:   Chief Complaint   Patient presents with   Cordelia Ortega     pt tripped over her feet landing on her pregnant left side     HPI:  Jacqueline Madison is a 38 y o  female who presents with fall from standing at home     Mechanism:Details of Incident: pt tripped over her feet falling landing on her pregnant left sideof stomach Injury Date: 21 Injury Time: 930 Injury Occurence Location - [address] [place] [address] Way: [facility]    Pt is a  at 30 weeks and 6 days gestation c/o fall at home  Pt tripped over her feet at her residence landing on her left side  Did strike her elbow on the ground, but denies pain  Did not strike head, no LOC, no neck pain  Only concern was the pregnancy  Pregnancy complicated by placenta previa  Denies contractions or uterine bleeding at this time  Denies any pain  Review of Systems   Constitutional: Negative for appetite change, chills, fatigue, fever and unexpected weight change  HENT: Negative for congestion, ear pain, rhinorrhea and sore throat  Eyes: Negative for pain and visual disturbance  Respiratory: Negative for cough, chest tightness, shortness of breath and wheezing  Cardiovascular: Negative for chest pain, palpitations and leg swelling  Gastrointestinal: Negative for abdominal pain, constipation, diarrhea, nausea and vomiting  Genitourinary: Negative for difficulty urinating, dysuria, frequency, hematuria, menstrual problem, pelvic pain, vaginal bleeding and vaginal discharge     Musculoskeletal: Negative for arthralgias, back pain and neck pain    Skin: Negative for color change and rash  Neurological: Negative for dizziness, seizures, syncope, light-headedness and headaches  Psychiatric/Behavioral: Negative for confusion and sleep disturbance  All other systems reviewed and are negative        Historical Information     Immunizations:   Immunization History   Administered Date(s) Administered    INFLUENZA 11/05/2014, 02/12/2015, 11/28/2015, 10/12/2016, 11/01/2017, 09/25/2018, 09/25/2018, 09/30/2019    Influenza Quadrivalent Preservative Free 3 years and older IM 08/26/2014, 10/12/2016, 11/01/2017    Influenza, seasonal, injectable 11/12/2014, 11/12/2015, 09/27/2017    Influenza, seasonal, injectable, preservative free 1985    Pneumococcal Conjugate 13-Valent 02/02/2016    Td (adult), adsorbed 1985    Tdap 12/19/2016, 08/30/2019    Tuberculin Skin Test-PPD Intradermal 11/10/2017       Past Medical History:   Diagnosis Date    Abnormal LFTs (liver function tests)     LAST ASSESSED: 68XYG7074    Adalimumab (Humira) long-term use     LAST ASSESSED: 09DWW6529    Affective disorder (HCC)     LAST ASSESSED: 19LGY2967    Anxiety     Aspiration into airway     LAST ASSESSED: 60UUB7868    Benign neoplasm of pituitary gland (HCC)     LAST ASSESSED: 72KIU4638    Chondromalacia, patella     LAST ASSESSED: 05YMB3332    Common migraine without aura     LAST ASSESSED: 90EVL0712    Depression with anxiety     Elevated C-reactive protein     LAST ASSESSED: 10ZOP5037    Exertional dyspnea     LAST ASSESSED: 46IMI7535    Hemicrania continua     LAST ASSESSED: 10AOX8396    High protein C activity level     LAST ASSESSED: 64PBL6734    Hyperprolactinemia (HCC)     LAST ASSESSED: 45XBC9264    Long-term use of hydroxychloroquine     LAST ASSESSED: 42GIF2096    Migraine     LAST ASSESSED: 47OBE4173    Polyarthritis     LAST ASSESSED: 10UEN3224    Polycystic ovarian disease     Prediabetes     LAST ASSESSED: 02EWS6519    Rheumatoid arthritis ([facility] [address] )     LAST ASSESSED: 04SQB6917    Seronegative rheumatoid arthritis ([facility] [address] )     LAST ASSESSED: 11TRN0907    TMJ (dislocation of temporomandibular joint)     APPEARANCE LAST ASSESSED: 38WLT2154    Trochanteric bursitis of right hip     LAST ASSESSED: 58ZOI7237    Vitamin D deficiency     LAST ASSESSED: 53XPS1254       Family History   Problem Relation Age of Onset    Arthritis Mother     Psoriasis Mother     No Known Problems Father     Lung disease Maternal Grandfather         BLACK    Diabetes Paternal Grandmother     Cancer Paternal Grandfather     Coronary artery disease Family     Colon cancer Family     Diabetes Family      Past Surgical History:   Procedure Laterality Date    DENTAL SURGERY      GASTRECTOMY      SLEEVE RESOLVED: 71CKW0453    NASAL SEPTUM SURGERY      DEVIATION REPAIR    NASAL SEPTUM SURGERY      TONSILLECTOMY      TONSILLECTOMY      TOOTH EXTRACTION      WISDOM TOOTH EXTRACTION      WISDOM TOOTH EXTRACTION       Social History     Tobacco Use    Smoking status: Never Smoker    Smokeless tobacco: Never Used   Substance Use Topics    Alcohol use: Not Currently     Comment: OCCASIONAL - 1 DRINK/MONTH AS PER ALLSCRIPTS    Drug use: No     E-Cigarette/Vaping    E-Cigarette Use Never User      E-Cigarette/Vaping Substances       Family History: non-contributory    Meds/Allergies   Prior to Admission Medications   Prescriptions Last Dose Informant Patient Reported? Taking?    Cholecalciferol (REPLESTA PO)   Yes No   Sig: Take 10,000 Units by mouth once a week    Injection Device (Inject-Ease Automatic Injector) MISC   No No   Sig: by Does not apply route daily as needed (for migraines) Sumatriptan auto injectors (to be used with cartridge)   Prenatal Vit-Fe Fumarate-FA (PRENATAL 1+1 PO)   Yes No   Sig: Take by mouth   SUMAtriptan Succinate 6 MG/0 5ML SOAJ   No No   Sig: Inject 0 5mL at onset of headache and may repeat in 2 hours, MAX 2 inject a day MAX 3 days/week   clonazePAM (KlonoPIN) 1 mg tablet   No No   Si tablet twice daily   cyanocobalamin 1,000 mcg/mL   No No   Sig: Inject IM every 30 days x 6 months   levonorgestrel-ethinyl estradiol (AVIANE,ALESSE,LESSINA) 0 1-20 MG-MCG per tablet   Yes No   Sig: Take 1 tablet by mouth daily  pantoprazole (PROTONIX) 40 mg tablet   No No   Sig: Take 1 tablet (40 mg total) by mouth daily      Facility-Administered Medications: None       Allergies   Allergen Reactions    Augmentin [Amoxicillin-Pot Clavulanate] GI Intolerance and Vomiting     Category: Adverse Reaction;     Nsaids      Other reaction(s): gastric sleeve    Pollen Extract      Other reaction(s): Other (Please comment)  Pt has post nasal drip,sneezing,eyes watery  PHYSICAL EXAM    PE limited by: None    Objective   Vitals:   First set: Temperature: (!) 97 2 °F (36 2 °C) (21 1022)  Pulse: (!) 123 (21 1022)  Respirations: 18 (21 1022)  Blood Pressure: 136/86 (21 1022)  SpO2: 97 % (21 1022)    Primary Survey:   (A) Airway:  Intact  (B) Breathing:  Intact, non-labored  (C) Circulation: Pulses:   normal  (D) Disabliity:  GCS Total:  15  (E) Expose:  Completed    Secondary Survey: (Click on Physical Exam tab above)  Physical Exam  Vitals signs and nursing note reviewed  Constitutional:       General: She is not in acute distress  Appearance: Normal appearance  She is well-developed and normal weight  HENT:      Head: Normocephalic and atraumatic  Nose: Nose normal       Mouth/Throat:      Mouth: Mucous membranes are moist       Pharynx: Oropharynx is clear  Eyes:      General: No scleral icterus  Extraocular Movements: Extraocular movements intact  Conjunctiva/sclera: Conjunctivae normal    Neck:      Musculoskeletal: Normal range of motion and neck supple  No neck rigidity or muscular tenderness  Cardiovascular:      Rate and Rhythm: Regular rhythm  Tachycardia present        Pulses: Normal pulses  Heart sounds: Normal heart sounds  No murmur  No friction rub  No gallop  Pulmonary:      Effort: Pulmonary effort is normal  No respiratory distress  Breath sounds: Normal breath sounds  No stridor  No wheezing, rhonchi or rales  Abdominal:      Palpations: Abdomen is soft  There is no mass  Tenderness: There is no abdominal tenderness  There is no right CVA tenderness, left CVA tenderness, guarding or rebound  Hernia: No hernia is present  Comments: Gravid c/w 30 weeks gestation   Musculoskeletal: Normal range of motion  General: No swelling, tenderness, deformity or signs of injury  Right lower leg: No edema  Left lower leg: No edema  Skin:     General: Skin is warm and dry  Capillary Refill: Capillary refill takes less than 2 seconds  Coloration: Skin is not jaundiced or pale  Findings: No bruising, erythema, lesion or rash  Neurological:      General: No focal deficit present  Mental Status: She is alert and oriented to person, place, and time  Mental status is at baseline  Psychiatric:         Mood and Affect: Mood normal          Behavior: Behavior normal          Cervical spine cleared by clinical criteria?  Yes     Invasive Devices     None                 Lab Results:   Results Reviewed     Procedure Component Value Units Date/Time    Urine Microscopic [[id number]]  (Abnormal) Collected: 04/13/21 1041    Lab Status: Final result Specimen: Urine, Clean Catch Updated: 04/13/21 1104     RBC, UA None Seen /hpf      WBC, UA 2-4 /hpf      Epithelial Cells Moderate /hpf      Bacteria, UA Occasional /hpf      MUCUS THREADS Moderate     URINE COMMENT --    UA w Reflex to Microscopic w Reflex to Culture [[id number]]  (Abnormal) Collected: 04/13/21 1041    Lab Status: Final result Specimen: Urine, Clean Catch Updated: 04/13/21 1053     Color, UA Yellow     Clarity, UA Slightly Cloudy     Specific Gravity, UA 1 015     pH, UA 6 5 Leukocytes, UA Small     Nitrite, UA Negative     Protein, UA Negative mg/dl      Glucose,  (1/4%) mg/dl      Ketones, UA Trace mg/dl      Urobilinogen, UA 1 0 E U /dl      Bilirubin, UA Negative     Blood, UA Negative     URINE COMMENT --    Urine culture [[id number]] Collected: 04/13/21 1041    Lab Status: In process Specimen: Urine, Clean Catch Updated: 04/13/21 1053                 Imaging Studies:   Direct to CT: No  No orders to display         Procedures  Procedures         ED Course     Bedside ultrasound reveals IUP with FHT 150s  Patient states she had a gastric sleeve surgery that whenever she drinks a sugary drink, she will have glucose in the urine  Approximately 2 weeks ago she had a 1 week glucose testing parameters for gestational diabetes and was informed was negative  Will occasionally get glucose in the urine which is not an abnormal finding per patient  Continues to have no bleeding pain or discomfort  Will discharge home follow-up with Maternal Fetal Medicine, OB     MDM        Disposition  [facility] Transfer: No  Final diagnoses:   Fall, initial encounter   Pregnancy with one fetus in third trimester     Time reflects when diagnosis was documented in both MDM as applicable and the Disposition within this note     Time User Action Codes Description Comment    4/13/2021 11:44 AM Jacky JOHNSON Add Winter Ludwig  XXXA] Fall, initial encounter     4/13/2021 11:46 AM Crow Angelo Add [[id number]] Pregnancy with one fetus in third trimester       ED Disposition     ED Disposition Condition Date/Time Comment    Discharge Stable Tue Apr 13, 2021 11:44 AM Angelika Felipe discharge to home/self care              Follow-up Information     Follow up With Specialties Details Why Contact Gloria Muñoz, DO Family Medicine Schedule an appointment as soon as possible for a visit   [address] [place] [address]  Scottie [address] [id number]  [phone]          Current Discharge Medication List CONTINUE these medications which have NOT CHANGED    Details   Cholecalciferol (REPLESTA PO) Take 10,000 Units by mouth once a week       clonazePAM (KlonoPIN) 1 mg tablet 1 tablet twice daily  Qty: 60 tablet, Refills: 0    Associated Diagnoses: Anxiety      cyanocobalamin 1,000 mcg/mL Inject IM every 30 days x 6 months  Qty: 1 mL, Refills: 5    Associated Diagnoses: Anemia due to vitamin B12 deficiency, unspecified B12 deficiency type      Injection Device (Inject-Ease Automatic Injector) MISC by Does not apply route daily as needed (for migraines) Sumatriptan auto injectors (to be used with cartridge)  Qty: 2 each, Refills: 5    Associated Diagnoses: Migraine with aura and without status migrainosus, not intractable      levonorgestrel-ethinyl estradiol (AVIANE,ALESSE,LESSINA) 0 1-20 MG-MCG per tablet Take 1 tablet by mouth daily  pantoprazole (PROTONIX) 40 mg tablet Take 1 tablet (40 mg total) by mouth daily  Qty: 30 tablet, Refills: 0    Associated Diagnoses: Reflux esophagitis      Prenatal Vit-Fe Fumarate-FA (PRENATAL 1+1 PO) Take by mouth      SUMAtriptan Succinate 6 MG/0 5ML SOAJ Inject 0 5mL at onset of headache and may repeat in 2 hours, MAX 2 inject a day MAX 3 days/week  Qty: 9 Cartridge, Refills: 3    Associated Diagnoses: Migraine with aura and without status migrainosus, not intractable           No discharge procedures on file      PDMP Review       Value Time User    PDMP Reviewed  Yes 9/23/2020 10:10 AM Coreen Briceno DO          ED Provider  Electronically Signed by         Miranda Lima DO  04/13/21 0969

## 2021-04-13 NOTE — DISCHARGE INSTRUCTIONS
Follow-up with your obstetrician and maternal fetal medicine physicians next available appointment  Return to the emergency department if condition worsens

## 2021-04-14 LAB — BACTERIA UR CULT: NORMAL

## 2021-07-27 DIAGNOSIS — R10.13 POSTPRANDIAL EPIGASTRIC PAIN: Primary | ICD-10-CM

## 2021-08-03 ENCOUNTER — HOSPITAL ENCOUNTER (OUTPATIENT)
Dept: RADIOLOGY | Facility: HOSPITAL | Age: 36
Discharge: HOME | End: 2021-08-03
Attending: FAMILY MEDICINE
Payer: COMMERCIAL

## 2021-08-03 ENCOUNTER — HOSPITAL ENCOUNTER (OUTPATIENT)
Dept: ULTRASOUND IMAGING | Facility: HOSPITAL | Age: 36
Discharge: HOME | End: 2021-08-03
Attending: FAMILY MEDICINE
Payer: COMMERCIAL

## 2021-08-03 DIAGNOSIS — R10.13 POSTPRANDIAL EPIGASTRIC PAIN: ICD-10-CM

## 2021-08-03 PROCEDURE — 76705 ECHO EXAM OF ABDOMEN: CPT

## 2021-08-03 PROCEDURE — 74240 X-RAY XM UPR GI TRC 1CNTRST: CPT

## 2021-08-04 ENCOUNTER — OFFICE VISIT (OUTPATIENT)
Dept: FAMILY MEDICINE CLINIC | Facility: CLINIC | Age: 36
End: 2021-08-04
Payer: COMMERCIAL

## 2021-08-04 VITALS
HEIGHT: 65 IN | WEIGHT: 205 LBS | TEMPERATURE: 96 F | BODY MASS INDEX: 34.16 KG/M2 | DIASTOLIC BLOOD PRESSURE: 88 MMHG | SYSTOLIC BLOOD PRESSURE: 118 MMHG

## 2021-08-04 DIAGNOSIS — E22.1 HYPERPROLACTINEMIA (HCC): ICD-10-CM

## 2021-08-04 DIAGNOSIS — E53.8 VITAMIN B12 DEFICIENCY: ICD-10-CM

## 2021-08-04 DIAGNOSIS — Z13.220 SCREENING FOR HYPERLIPIDEMIA: ICD-10-CM

## 2021-08-04 DIAGNOSIS — Z00.00 WELLNESS EXAMINATION: ICD-10-CM

## 2021-08-04 DIAGNOSIS — D64.9 ANEMIA, UNSPECIFIED TYPE: ICD-10-CM

## 2021-08-04 DIAGNOSIS — Z13.1 SCREENING FOR DIABETES MELLITUS: ICD-10-CM

## 2021-08-04 PROCEDURE — 3725F SCREEN DEPRESSION PERFORMED: CPT | Performed by: FAMILY MEDICINE

## 2021-08-04 PROCEDURE — 99395 PREV VISIT EST AGE 18-39: CPT | Performed by: FAMILY MEDICINE

## 2021-08-04 PROCEDURE — 1036F TOBACCO NON-USER: CPT | Performed by: FAMILY MEDICINE

## 2021-08-04 NOTE — PROGRESS NOTES
Assessment/Plan:draw iron and  Cbc along with lipid and HbA1c    Problem List Items Addressed This Visit        Endocrine    Hyperprolactinemia ([facility] )    Relevant Orders    Prolactin      Other Visit Diagnoses     Delayed and secondary postpartum hemorrhage with delivery    -  Primary    Relevant Orders    Protime-INR    APTT    Screening for diabetes mellitus        Relevant Orders    Hemoglobin A1C    Vitamin B12 deficiency        Relevant Orders    Vitamin B12    Anemia, unspecified type        Relevant Orders    CBC and differential    Iron Panel (Includes Ferritin, Iron Sat%, Iron, and TIBC)    Wellness examination               Diagnoses and all orders for this visit:    Delayed and secondary postpartum hemorrhage with delivery  -     Protime-INR; Future  -     APTT    Screening for diabetes mellitus  -     Hemoglobin A1C; Future    Vitamin B12 deficiency  -     Vitamin B12; Future    Hyperprolactinemia (HCC)  -     Prolactin; Future    Anemia, unspecified type  -     CBC and differential; Future  -     Iron Panel (Includes Ferritin, Iron Sat%, Iron, and TIBC); Future    Wellness examination        No problem-specific Assessment & Plan notes found for this encounter  Subjective:      Patient ID: Luciano Mckeon is a 38 y o  female      Patient here for her wellness exam and also has recently delivered a daughter and is doing well she did have postpartum delayed hemorrhage and is now in need of CBC iron panel to be sure that she has restored her iron stores also needs hemoglobin A1c and a lipid profile      The following portions of the patient's history were reviewed and updated as appropriate:   She has a past medical history of Abnormal LFTs (liver function tests), Adalimumab (Humira) long-term use, Affective disorder ([facility] ), Anxiety, Aspiration into airway, Benign neoplasm of pituitary gland (HCC), Chondromalacia, patella, Common migraine without aura, Depression with anxiety, Elevated C-reactive protein, Exertional dyspnea, Hemicrania continua, High protein C activity level, Hyperprolactinemia (HCC), Long-term use of hydroxychloroquine, Migraine, Polyarthritis, Polycystic ovarian disease, Prediabetes, Rheumatoid arthritis ([address] ), Seronegative rheumatoid arthritis ([facility] ), TMJ (dislocation of temporomandibular joint), Trochanteric bursitis of right hip, and Vitamin D deficiency  ,  does not have any pertinent problems on file  ,   has a past surgical history that includes Unity tooth extraction; Tonsillectomy; Nasal septum surgery; Unity tooth extraction; Nasal septum surgery; Tonsillectomy; Dental surgery; Gastrectomy; and Tooth extraction  ,  family history includes Arthritis in her mother; Cancer in her paternal grandfather; Colon cancer in her family; Coronary artery disease in her family; Diabetes in her family and paternal grandmother; Lung disease in her maternal grandfather; No Known Problems in her father; Psoriasis in her mother  ,   reports that she has never smoked  She has never used smokeless tobacco  She reports previous alcohol use  She reports that she does not use drugs  ,  is allergic to augmentin [amoxicillin-pot clavulanate], nsaids, and pollen extract     Current Outpatient Medications   Medication Sig Dispense Refill    Cholecalciferol (REPLESTA PO) Take 10,000 Units by mouth once a week       cyanocobalamin 1,000 mcg/mL Inject IM every 30 days x 6 months 1 mL 5    levonorgestrel-ethinyl estradiol (AVIANE,ALESSE,LESSINA) 0 1-20 MG-MCG per tablet Take 1 tablet by mouth daily   SUMAtriptan Succinate 6 MG/0 5ML SOAJ Inject 0 5mL at onset of headache and may repeat in 2 hours, MAX 2 inject a day MAX 3 days/week 9 Cartridge 3     No current facility-administered medications for this visit  Review of Systems   Constitutional: Negative for activity change, appetite change, diaphoresis, fatigue and fever  HENT: Negative  Eyes: Negative      Respiratory: Negative for apnea, cough, chest tightness, shortness of breath and wheezing  Cardiovascular: Negative for chest pain, palpitations and leg swelling  Gastrointestinal: Negative for abdominal distention, abdominal pain, anal bleeding, constipation, diarrhea, nausea and vomiting  Endocrine: Negative for cold intolerance, heat intolerance, polydipsia, polyphagia and polyuria  Genitourinary: Negative for difficulty urinating, dysuria, flank pain, hematuria and urgency  Musculoskeletal: Negative for arthralgias, back pain, gait problem, joint swelling and myalgias  Skin: Negative for color change, rash and wound  Allergic/Immunologic: Negative for environmental allergies, food allergies and immunocompromised state  Neurological: Negative for dizziness, seizures, syncope, speech difficulty, numbness and headaches  Hematological: Negative for adenopathy  Does not bruise/bleed easily  Psychiatric/Behavioral: Negative for agitation, behavioral problems, hallucinations, sleep disturbance and suicidal ideas  Objective:  Vitals:    08/04/21 1424   BP: 118/88   BP Location: Right arm   Patient Position: Sitting   Cuff Size: Standard   Temp: (!) 96 °F (35 6 °C)   TempSrc: Temporal   Weight: 93 kg (205 lb)   Height: 5' 5" (1 651 m)     Body mass index is 34 11 kg/m²  Physical Exam  Constitutional:       General: She is not in acute distress  Appearance: She is well-developed  She is not diaphoretic  HENT:      Head: Normocephalic  Right Ear: External ear normal       Left Ear: External ear normal       Nose: Nose normal    Eyes:      General: No scleral icterus  Right eye: No discharge  Left eye: No discharge  Conjunctiva/sclera: Conjunctivae normal       Pupils: Pupils are equal, round, and reactive to light  Neck:      Thyroid: No thyromegaly  Trachea: No tracheal deviation  Cardiovascular:      Rate and Rhythm: Normal rate and regular rhythm  Heart sounds: Normal heart sounds  No murmur heard  No friction rub  No gallop  Pulmonary:      Effort: Pulmonary effort is normal  No respiratory distress  Breath sounds: Normal breath sounds  No wheezing  Abdominal:      General: Bowel sounds are normal       Palpations: Abdomen is soft  There is no mass  Tenderness: There is no abdominal tenderness  There is no guarding  Musculoskeletal:         General: No deformity  Cervical back: Normal range of motion  Lymphadenopathy:      Cervical: No cervical adenopathy  Skin:     General: Skin is warm and dry  Findings: No erythema or rash  Neurological:      Mental Status: She is alert and oriented to person, place, and time  Cranial Nerves: No cranial nerve deficit  Psychiatric:         Thought Content:  Thought content normal

## 2021-08-05 ENCOUNTER — CONSULT (OUTPATIENT)
Dept: GASTROENTEROLOGY | Facility: CLINIC | Age: 36
End: 2021-08-05
Payer: COMMERCIAL

## 2021-08-05 VITALS
TEMPERATURE: 97 F | DIASTOLIC BLOOD PRESSURE: 81 MMHG | HEIGHT: 66 IN | HEART RATE: 86 BPM | WEIGHT: 205 LBS | SYSTOLIC BLOOD PRESSURE: 128 MMHG | BODY MASS INDEX: 32.95 KG/M2

## 2021-08-05 DIAGNOSIS — K21.00 GASTROESOPHAGEAL REFLUX DISEASE WITH ESOPHAGITIS, UNSPECIFIED WHETHER HEMORRHAGE: ICD-10-CM

## 2021-08-05 DIAGNOSIS — R10.13 POSTPRANDIAL EPIGASTRIC PAIN: ICD-10-CM

## 2021-08-05 PROCEDURE — 3008F BODY MASS INDEX DOCD: CPT | Performed by: FAMILY MEDICINE

## 2021-08-05 PROCEDURE — 99243 OFF/OP CNSLTJ NEW/EST LOW 30: CPT | Performed by: NURSE PRACTITIONER

## 2021-08-05 PROCEDURE — 1036F TOBACCO NON-USER: CPT | Performed by: NURSE PRACTITIONER

## 2021-08-05 RX ORDER — OMEPRAZOLE 40 MG/1
40 CAPSULE, DELAYED RELEASE ORAL 2 TIMES DAILY
Qty: 60 CAPSULE | Refills: 3 | Status: SHIPPED | OUTPATIENT
Start: 2021-08-05 | End: 2022-02-10

## 2021-08-05 NOTE — PROGRESS NOTES
[facility] - Outpatient Consultation  Carla Meeks 38 y o  female MRN: [id number]  Encounter: [id number]          ASSESSMENT AND PLAN:    1  Postprandial epigastric pain  2  Gastroesophageal reflux disease with esophagitis, unspecified whether hemorrhage  Patient has a history of reflux after gastric sleeve in 2017 as well as during both pregnancies  She has been on multiple PPIs during pregnancy with little relief  She is currently not taking anything for reflux  Right upper quadrant ultrasound revealed normal gallbladder with a few tiny mobile stones, no choledocholithiasis and no intrahepatic biliary dilatation  Upper GI also ordered revealed a sliding hiatal hernia and high volume high level gastroesophageal reflux  Patient reports reflux after anything that she eats or drinks  She reports nausea couple times a week  She denies vomiting at present  She denies dysphagia however states she feels like food gets stuck and has to often reposition to get it to go down  Reports increased belching  Reports abdominal discomfort and chest pressure when food gets stuck  Would recommend an EGD to evaluate for etiology  Discussed process, risks and benefits  Patient agreeable  Will start a PPI twice a day given the severity of her symptoms  Handout on GERD diet and lifestyle given and discussed  - Ambulatory referral to Gastroenterology  - omeprazole (PriLOSEC) 40 MG capsule; Take 1 capsule (40 mg total) by mouth 2 (two) times a day  Dispense: 60 capsule; Refill: 3  - EGD; Future    Will see her back after procedures  ______________________________________________________________________    HPI:   Justin Patiño is a very pleasant 25-year-old female who presents today with reflux  Patient reports that she had a gastric sleeve done in 2017 and lost 125 lb    She reports that shortly after her surgery she did have a flare up with reflux an episode of aspiration pneumonia but no other issues until her pregnancy in 2019  At about 28 weeks during her pregnancy she did develop heartburn and was placed on Prevacid with good relief  She had been doing well after that pregnancy  She then became pregnant again and developed heartburn about 15 weeks of pregnancy  She was again placed on Prevacid but then developed palpitations and was changed to omeprazole which had not worked, then change to [employer] which also did did not work  She states that she just decided to deal with it hoping that it would go away after her pregnancy  She is currently not taking anything for reflux  She states that she delivered 6 weeks ago and is still having reflux up to 8 times a day, she reports reflux which with anything she eats or drinks  She denies dysphagia however reports that she feels that food gets stuck and sometimes has to reposition and straighten up to get the food to go down  She reports lot of belching and reports "sulfur" belching which she states smells like rotten eggs  She states that she had 1 episode of vomiting during pregnancy because the food got stuck but nothing since  She reports nausea once or twice a week  She reports eating smaller more frequent meals but states that she has no unexpected weight loss in still has a good appetite  She reports epigastric abdominal discomfort and chest pressure when food gets stuck  She reported this to her family doctor at recent visit, an ultrasound of gallbladder was ordered that revealed no wall thickening or fluid no intrahepatic biliary dilatation, CBD measuring 3 mm, a few tiny mobile gallstones and no choledocholithiasis  In addition her family doctor ordered an upper GI series that revealed a sliding hiatal hernia and high volume high level gastroesophageal reflux  Patient denies lower abdominal pain, constipation, diarrhea, black or bloody stools    Patient states she was taking iron after pregnancy but she has completed the course  Patient states that she had an EGD prior to her gastric sleeve which she believes was normal            REVIEW OF SYSTEMS:    Review of Systems   Constitutional: Negative for fever  HENT: Negative for trouble swallowing  Gastrointestinal: Positive for abdominal pain and nausea  Negative for abdominal distention, anal bleeding (Epigastric), blood in stool, constipation, diarrhea, rectal pain and vomiting  Genitourinary: Negative for dysuria, frequency and hematuria  Musculoskeletal: Negative for arthralgias and myalgias  Neurological: Negative for headaches       Answers for HPI/ROS submitted by the patient on 8/4/2021  Chronicity: new  Onset: more than 1 month ago  Onset quality: gradual  Frequency: 2 to 4 times per day  Episode duration: 11 months  Progression since onset: waxing and waning  Pain - numeric: 4/10  Pain quality: a sensation of fullness, sharp  Radiates to: chest  anorexia: Yes  arthralgias: No  belching: Yes  constipation: No  diarrhea: No  dysuria: No  fever: No  flatus: No  frequency: No  headaches: No  hematochezia: No  hematuria: No  melena: No  myalgias: No  nausea: Yes  weight loss: No  vomiting: No  Aggravated by: eating  Relieved by: movement  Diagnostic workup: ultrasound      Historical Information   Past Medical History:   Diagnosis Date    Abnormal LFTs (liver function tests)     LAST ASSESSED: [id number]    Adalimumab (Humira) long-term use     LAST ASSESSED: [id number]    Affective disorder (HCC)     LAST ASSESSED: [id number]    Anxiety     Aspiration into airway     LAST ASSESSED: [id number]    Benign neoplasm of pituitary gland (HCC)     LAST ASSESSED: [id number]    Chondromalacia, patella     LAST ASSESSED: [id number]    Common migraine without aura     LAST ASSESSED: [id number]    Depression with anxiety     Elevated C-reactive protein     LAST ASSESSED: [id number]    Exertional dyspnea     LAST ASSESSED: [id number]    Hemicrania continua     LAST ASSESSED: 90ZRZ2040    High protein C activity level     LAST ASSESSED: [id number]    Hyperprolactinemia (HCC)     LAST ASSESSED: 19WUP0784    Long-term use of hydroxychloroquine     LAST ASSESSED: 68MLT9910    Migraine     LAST ASSESSED: 93VQA3345    Polyarthritis     LAST ASSESSED: 46XRD3088    Polycystic ovarian disease     Prediabetes     LAST ASSESSED: 81HRU8317    Rheumatoid arthritis ([address] )     LAST ASSESSED: 61DTR8209    Seronegative rheumatoid arthritis (HCC)     LAST ASSESSED: 60KUH2934    TMJ (dislocation of temporomandibular joint)     APPEARANCE LAST ASSESSED: 82PJZ2500    Trochanteric bursitis of right hip     LAST ASSESSED: 08GFJ8744    Vitamin D deficiency     LAST ASSESSED: 61RHX4772     Past Surgical History:   Procedure Laterality Date    DENTAL SURGERY      GASTRECTOMY      SLEEVE RESOLVED: [id number]    NASAL SEPTUM SURGERY      DEVIATION REPAIR    NASAL SEPTUM SURGERY      TONSILLECTOMY      TONSILLECTOMY      TOOTH EXTRACTION      WISDOM TOOTH EXTRACTION      WISDOM TOOTH EXTRACTION       Social History   Social History     Substance and Sexual Activity   Alcohol Use Not Currently    Comment: OCCASIONAL - 1 DRINK/MONTH AS PER [employer]CR[employer]TS     Social History     Substance and Sexual Activity   Drug Use No     Social History     Tobacco Use   Smoking Status Never Smoker   Smokeless Tobacco Never Used     Family History   Problem Relation Age of Onset    Arthritis Mother     Psoriasis Mother     No Known Problems Father     Lung disease Maternal Grandfather         BLACK    Diabetes Paternal Grandmother     Cancer Paternal Grandfather     Coronary artery disease Family     Colon cancer Family     Diabetes Family        Meds/Allergies       Current Outpatient Medications:     Cholecalciferol (REPLESTA PO)    cyanocobalamin 1,000 mcg/mL    levonorgestrel-ethinyl estradiol (AVIANE,ALESSE,LESSINA) 0 1-20 MG-MCG per tablet    SUMAtriptan Succinate 6 MG/0 5ML SOAJ    Allergies Allergen Reactions    Augmentin [Amoxicillin-Pot Clavulanate] GI Intolerance and Vomiting     Category: Adverse Reaction;     Nsaids      Other reaction(s): gastric sleeve    Pollen Extract      Other reaction(s): Other (Please comment)  Pt has post nasal drip,sneezing,eyes watery  Objective     Blood pressure 128/81, pulse 86, temperature (!) 97 °F (36 1 °C), temperature source Tympanic, height 5' 6" (1 676 m), weight 93 kg (205 lb), last menstrual period 09/13/2020, not currently breastfeeding  Body mass index is 33 09 kg/m²  PHYSICAL EXAM:      General Appearance:   Alert, cooperative, no distress   HEENT:   Normocephalic, atraumatic, anicteric  Neck:  Symmetrical, trachea midline   Lungs:   Clear to auscultation bilaterally; no rales, rhonchi or wheezing; respirations unlabored    Heart[de-identified]   Regular rate and rhythm; no murmur, rub, or gallop  Abdomen:   Soft, non-tender, non-distended; normal bowel sounds; no masses, no organomegaly    Genitalia:   Deferred    Rectal:   Deferred    Skin:  No jaundice, rashes, or lesions             Lab Results:   No visits with results within 1 Day(s) from this visit     Latest known visit with results is:   Admission on 04/13/2021, Discharged on 04/13/2021   Component Date Value    Color, UA 04/13/2021 Yellow     Clarity, UA 04/13/2021 Slightly Cloudy     Specific Gravity, UA 04/13/2021 1 015     pH, UA 04/13/2021 6 5     Leukocytes, UA 04/13/2021 Small*    Nitrite, UA 04/13/2021 Negative     Protein, UA 04/13/2021 Negative     Glucose, UA 04/13/2021 250 (1/4%)*    Ketones, UA 04/13/2021 Trace*    Urobilinogen, UA 04/13/2021 1 0     Bilirubin, UA 04/13/2021 Negative     Blood, UA 04/13/2021 Negative     URINE COMMENT 04/13/2021      RBC, UA 04/13/2021 None Seen     WBC, UA 04/13/2021 2-4     Epithelial Cells 04/13/2021 Moderate*    Bacteria, UA 04/13/2021 Occasional     MUCUS THREADS 04/13/2021 Moderate*    URINE COMMENT 04/13/2021  Urine Culture 04/13/2021 [id number] cfu/ml           Radiology Results:   US gallbladder    Result Date: 8/4/2021  Narrative: RIGHT UPPER QUADRANT ULTRASOUND INDICATION:     R10 13: Epigastric pain  COMPARISON:  Ultrasound 1/6/2017 TECHNIQUE:   Real-time ultrasound of the right upper quadrant was performed with a curvilinear transducer with both volumetric sweeps and still imaging techniques  FINDINGS: PANCREAS:  Visualized portions of the pancreas are within normal limits  AORTA AND IVC:  Visualized portions are normal for patient age  LIVER: Size:  Within normal range  The liver measures 16 cm in the midclavicular line  Contour:  Surface contour is smooth  Parenchyma:  Echogenicity and echotexture are within normal limits  No evidence of suspicious mass  Limited imaging of the main portal vein shows it to be patent and hepatopetal   BILIARY: The gallbladder is normal in caliber  No wall thickening or pericholecystic fluid  A few tiny mobile gallstones identified  No sonographic Severino sign  No intrahepatic biliary dilatation  CBD measures 3 mm  No choledocholithiasis  KIDNEY: Right kidney measures 11 1 cm  Within normal limits  ASCITES:   None  Impression: Cholelithiasis with no evidence of cholecystitis  No biliary dilatation  Workstation performed: [id number]     [facility] UPPER GI UGI    Result Date: 8/3/2021  Narrative: UPPER GI SERIES  DOUBLE CONTRAST INDICATION: R10 13: Epigastric pain  Esophageal reflux Sensation of food getting stuck following recent pregnancy COMPARISON:  X-ray 2/5/15 IMAGES:  7 8 FLUOROSCOPY TIME:   1 MINUTE TECHNIQUE:  The patient was given effervescent granules and barium  by mouth and images of the esophagus, stomach, and small bowel were obtained  FINDINGS: The esophagus is normal in caliber  Esophageal motility is normal and emptying of contrast from the esophagus is prompt  There is no mucosal mass, ulceration or fold thickening identified  The stomach is unremarkable in size  The gastric mucosa is normal   No penetrating ulcers or masses  Contrast empties promptly into the duodenum  The duodenum is normal in caliber  The ligament of Treitz/duodenojejunal junction lies in a normal position  Gastroesophageal reflux was present, high volume high-level  Sliding hiatal hernia       Impression: 1  Sliding hiatal hernia 2    Esophageal reflux Workstation performed: [id number]

## 2021-08-05 NOTE — H&P (VIEW-ONLY)
[facility] - Outpatient Consultation  Luciano Mckeon 38 y o  female MRN: [id number]  Encounter: [id number]          ASSESSMENT AND PLAN:    1  Postprandial epigastric pain  2  Gastroesophageal reflux disease with esophagitis, unspecified whether hemorrhage  Patient has a history of reflux after gastric sleeve in 2017 as well as during both pregnancies  She has been on multiple PPIs during pregnancy with little relief  She is currently not taking anything for reflux  Right upper quadrant ultrasound revealed normal gallbladder with a few tiny mobile stones, no choledocholithiasis and no intrahepatic biliary dilatation  Upper GI also ordered revealed a sliding hiatal hernia and high volume high level gastroesophageal reflux  Patient reports reflux after anything that she eats or drinks  She reports nausea couple times a week  She denies vomiting at present  She denies dysphagia however states she feels like food gets stuck and has to often reposition to get it to go down  Reports increased belching  Reports abdominal discomfort and chest pressure when food gets stuck  Would recommend an EGD to evaluate for etiology  Discussed process, risks and benefits  Patient agreeable  Will start a PPI twice a day given the severity of her symptoms  Handout on GERD diet and lifestyle given and discussed  - Ambulatory referral to Gastroenterology  - omeprazole (PriLOSEC) 40 MG capsule; Take 1 capsule (40 mg total) by mouth 2 (two) times a day  Dispense: 60 capsule; Refill: 3  - EGD; Future    Will see her back after procedures  ______________________________________________________________________    HPI:   Isidro Potts is a very pleasant 28-year-old female who presents today with reflux  Patient reports that she had a gastric sleeve done in 2017 and lost 125 lb    She reports that shortly after her surgery she did have a flare up with reflux an episode of aspiration pneumonia but no other issues until her pregnancy in 2019  At about 28 weeks during her pregnancy she did develop heartburn and was placed on Prevacid with good relief  She had been doing well after that pregnancy  She then became pregnant again and developed heartburn about 15 weeks of pregnancy  She was again placed on Prevacid but then developed palpitations and was changed to omeprazole which had not worked, then change to [employer] which also did did not work  She states that she just decided to deal with it hoping that it would go away after her pregnancy  She is currently not taking anything for reflux  She states that she delivered 6 weeks ago and is still having reflux up to 8 times a day, she reports reflux which with anything she eats or drinks  She denies dysphagia however reports that she feels that food gets stuck and sometimes has to reposition and straighten up to get the food to go down  She reports lot of belching and reports "sulfur" belching which she states smells like rotten eggs  She states that she had 1 episode of vomiting during pregnancy because the food got stuck but nothing since  She reports nausea once or twice a week  She reports eating smaller more frequent meals but states that she has no unexpected weight loss in still has a good appetite  She reports epigastric abdominal discomfort and chest pressure when food gets stuck  She reported this to her family doctor at recent visit, an ultrasound of gallbladder was ordered that revealed no wall thickening or fluid no intrahepatic biliary dilatation, CBD measuring 3 mm, a few tiny mobile gallstones and no choledocholithiasis  In addition her family doctor ordered an upper GI series that revealed a sliding hiatal hernia and high volume high level gastroesophageal reflux  Patient denies lower abdominal pain, constipation, diarrhea, black or bloody stools    Patient states she was taking iron after pregnancy but she has completed the course  Patient states that she had an EGD prior to her gastric sleeve which she believes was normal            REVIEW OF SYSTEMS:    Review of Systems   Constitutional: Negative for fever  HENT: Negative for trouble swallowing  Gastrointestinal: Positive for abdominal pain and nausea  Negative for abdominal distention, anal bleeding (Epigastric), blood in stool, constipation, diarrhea, rectal pain and vomiting  Genitourinary: Negative for dysuria, frequency and hematuria  Musculoskeletal: Negative for arthralgias and myalgias  Neurological: Negative for headaches       Answers for HPI/ROS submitted by the patient on 8/4/2021  Chronicity: new  Onset: more than 1 month ago  Onset quality: gradual  Frequency: 2 to 4 times per day  Episode duration: 11 months  Progression since onset: waxing and waning  Pain - numeric: 4/10  Pain quality: a sensation of fullness, sharp  Radiates to: chest  anorexia: Yes  arthralgias: No  belching: Yes  constipation: No  diarrhea: No  dysuria: No  fever: No  flatus: No  frequency: No  headaches: No  hematochezia: No  hematuria: No  melena: No  myalgias: No  nausea: Yes  weight loss: No  vomiting: No  Aggravated by: eating  Relieved by: movement  Diagnostic workup: ultrasound      Historical Information   Past Medical History:   Diagnosis Date    Abnormal LFTs (liver function tests)     LAST ASSESSED: [id number]    Adalimumab (Humira) long-term use     LAST ASSESSED: [id number]    Affective disorder (HCC)     LAST ASSESSED: 87RKS1709    Anxiety     Aspiration into airway     LAST ASSESSED: [id number]    Benign neoplasm of pituitary gland (HCC)     LAST ASSESSED: 07HEN6554    Chondromalacia, patella     LAST ASSESSED: 69HTA3678    Common migraine without aura     LAST ASSESSED: 78UWH9300    Depression with anxiety     Elevated C-reactive protein     LAST ASSESSED: 21NPR0065    Exertional dyspnea     LAST ASSESSED: 82EWK6950    Hemicrania continua     LAST ASSESSED: 71WDL8039    High protein C activity level     LAST ASSESSED: 15LKD4807    Hyperprolactinemia (HCC)     LAST ASSESSED: 91BYA6870    Long-term use of hydroxychloroquine     LAST ASSESSED: 37XHQ5141    Migraine     LAST ASSESSED: 95DST5906    Polyarthritis     LAST ASSESSED: 58LDN4892    Polycystic ovarian disease     Prediabetes     LAST ASSESSED: 63EKK2988    Rheumatoid arthritis ([address] )     LAST ASSESSED: 35OHE0379    Seronegative rheumatoid arthritis (HCC)     LAST ASSESSED: 57FNY9632    TMJ (dislocation of temporomandibular joint)     APPEARANCE LAST ASSESSED: 70CTD6449    Trochanteric bursitis of right hip     LAST ASSESSED: 26BYZ7197    Vitamin D deficiency     LAST ASSESSED: 47DVI0262     Past Surgical History:   Procedure Laterality Date    DENTAL SURGERY      GASTRECTOMY      SLEEVE RESOLVED: [id number]    NASAL SEPTUM SURGERY      DEVIATION REPAIR    NASAL SEPTUM SURGERY      TONSILLECTOMY      TONSILLECTOMY      TOOTH EXTRACTION      WISDOM TOOTH EXTRACTION      WISDOM TOOTH EXTRACTION       Social History   Social History     Substance and Sexual Activity   Alcohol Use Not Currently    Comment: OCCASIONAL - 1 DRINK/MONTH AS PER ALLSCRIPTS     Social History     Substance and Sexual Activity   Drug Use No     Social History     Tobacco Use   Smoking Status Never Smoker   Smokeless Tobacco Never Used     Family History   Problem Relation Age of Onset    Arthritis Mother     Psoriasis Mother     No Known Problems Father     Lung disease Maternal Grandfather         BLACK    Diabetes Paternal Grandmother     Cancer Paternal Grandfather     Coronary artery disease Family     Colon cancer Family     Diabetes Family        Meds/Allergies       Current Outpatient Medications:     Cholecalciferol (REPLESTA PO)    cyanocobalamin 1,000 mcg/mL    levonorgestrel-ethinyl estradiol (AVIANE,ALESSE,LESSINA) 0 1-20 MG-MCG per tablet    SUMAtriptan Succinate 6 MG/0 5ML SOAJ    Allergies Allergen Reactions    Augmentin [Amoxicillin-Pot Clavulanate] GI Intolerance and Vomiting     Category: Adverse Reaction;     Nsaids      Other reaction(s): gastric sleeve    Pollen Extract      Other reaction(s): Other (Please comment)  Pt has post nasal drip,sneezing,eyes watery  Objective     Blood pressure 128/81, pulse 86, temperature (!) 97 °F (36 1 °C), temperature source Tympanic, height 5' 6" (1 676 m), weight 93 kg (205 lb), last menstrual period 09/13/2020, not currently breastfeeding  Body mass index is 33 09 kg/m²  PHYSICAL EXAM:      General Appearance:   Alert, cooperative, no distress   HEENT:   Normocephalic, atraumatic, anicteric  Neck:  Symmetrical, trachea midline   Lungs:   Clear to auscultation bilaterally; no rales, rhonchi or wheezing; respirations unlabored    Heart[de-identified]   Regular rate and rhythm; no murmur, rub, or gallop  Abdomen:   Soft, non-tender, non-distended; normal bowel sounds; no masses, no organomegaly    Genitalia:   Deferred    Rectal:   Deferred    Skin:  No jaundice, rashes, or lesions             Lab Results:   No visits with results within 1 Day(s) from this visit     Latest known visit with results is:   Admission on 04/13/2021, Discharged on 04/13/2021   Component Date Value    Color, UA 04/13/2021 Yellow     Clarity, UA 04/13/2021 Slightly Cloudy     Specific Gravity, UA 04/13/2021 1 015     pH, UA 04/13/2021 6 5     Leukocytes, UA 04/13/2021 Small*    Nitrite, UA 04/13/2021 Negative     Protein, UA 04/13/2021 Negative     Glucose, UA 04/13/2021 250 (1/4%)*    Ketones, UA 04/13/2021 Trace*    Urobilinogen, UA 04/13/2021 1 0     Bilirubin, UA 04/13/2021 Negative     Blood, UA 04/13/2021 Negative     URINE COMMENT 04/13/2021      RBC, UA 04/13/2021 None Seen     WBC, UA 04/13/2021 2-4     Epithelial Cells 04/13/2021 Moderate*    Bacteria, UA 04/13/2021 Occasional     MUCUS THREADS 04/13/2021 Moderate*    URINE COMMENT 04/13/2021  Urine Culture 04/13/2021 [id number] cfu/ml           Radiology Results:   US gallbladder    Result Date: 8/4/2021  Narrative: RIGHT UPPER QUADRANT ULTRASOUND INDICATION:     R10 13: Epigastric pain  COMPARISON:  Ultrasound 1/6/2017 TECHNIQUE:   Real-time ultrasound of the right upper quadrant was performed with a curvilinear transducer with both volumetric sweeps and still imaging techniques  FINDINGS: PANCREAS:  Visualized portions of the pancreas are within normal limits  AORTA AND IVC:  Visualized portions are normal for patient age  LIVER: Size:  Within normal range  The liver measures 16 cm in the midclavicular line  Contour:  Surface contour is smooth  Parenchyma:  Echogenicity and echotexture are within normal limits  No evidence of suspicious mass  Limited imaging of the main portal vein shows it to be patent and hepatopetal   BILIARY: The gallbladder is normal in caliber  No wall thickening or pericholecystic fluid  A few tiny mobile gallstones identified  No sonographic Severino sign  No intrahepatic biliary dilatation  CBD measures 3 mm  No choledocholithiasis  KIDNEY: Right kidney measures 11 1 cm  Within normal limits  ASCITES:   None  Impression: Cholelithiasis with no evidence of cholecystitis  No biliary dilatation  Workstation performed: [id number]     [facility] UPPER GI UGI    Result Date: 8/3/2021  Narrative: UPPER GI SERIES  DOUBLE CONTRAST INDICATION: R10 13: Epigastric pain  Esophageal reflux Sensation of food getting stuck following recent pregnancy COMPARISON:  X-ray 2/5/15 IMAGES:  7 8 FLUOROSCOPY TIME:   1 MINUTE TECHNIQUE:  The patient was given effervescent granules and barium  by mouth and images of the esophagus, stomach, and small bowel were obtained  FINDINGS: The esophagus is normal in caliber  Esophageal motility is normal and emptying of contrast from the esophagus is prompt  There is no mucosal mass, ulceration or fold thickening identified  The stomach is unremarkable in size  The gastric mucosa is normal   No penetrating ulcers or masses  Contrast empties promptly into the duodenum  The duodenum is normal in caliber  The ligament of Treitz/duodenojejunal junction lies in a normal position  Gastroesophageal reflux was present, high volume high-level  Sliding hiatal hernia       Impression: 1  Sliding hiatal hernia 2    Esophageal reflux Workstation performed: [id number]

## 2021-08-05 NOTE — PATIENT INSTRUCTIONS
EGD scheduled 9/24/21  Procedure packet with EGD instructions given and explained to patient, she expressed understanding  Follow up scheduled

## 2021-08-08 ENCOUNTER — LAB (OUTPATIENT)
Dept: LAB | Facility: HOSPITAL | Age: 36
End: 2021-08-08
Attending: FAMILY MEDICINE
Payer: COMMERCIAL

## 2021-08-08 DIAGNOSIS — Z13.1 SCREENING FOR DIABETES MELLITUS: ICD-10-CM

## 2021-08-08 DIAGNOSIS — E53.8 VITAMIN B12 DEFICIENCY: ICD-10-CM

## 2021-08-08 DIAGNOSIS — D64.9 ANEMIA, UNSPECIFIED TYPE: ICD-10-CM

## 2021-08-08 DIAGNOSIS — E22.1 HYPERPROLACTINEMIA (HCC): ICD-10-CM

## 2021-08-08 LAB
APTT PPP: 30 SECONDS (ref 23–37)
BASOPHILS # BLD AUTO: 0.04 THOUSANDS/ΜL (ref 0–0.1)
BASOPHILS NFR BLD AUTO: 1 % (ref 0–1)
CHOLEST SERPL-MCNC: 239 MG/DL (ref 50–200)
EOSINOPHIL # BLD AUTO: 0.27 THOUSAND/ΜL (ref 0–0.61)
EOSINOPHIL NFR BLD AUTO: 4 % (ref 0–6)
ERYTHROCYTE [DISTWIDTH] IN BLOOD BY AUTOMATED COUNT: 19.3 % (ref 11.6–15.1)
EST. AVERAGE GLUCOSE BLD GHB EST-MCNC: 100 MG/DL
FERRITIN SERPL-MCNC: 23 NG/ML (ref 8–388)
HBA1C MFR BLD: 5.1 %
HCT VFR BLD AUTO: 38.4 % (ref 34.8–46.1)
HDLC SERPL-MCNC: 36 MG/DL
HGB BLD-MCNC: 11.9 G/DL (ref 11.5–15.4)
IMM GRANULOCYTES # BLD AUTO: 0.04 THOUSAND/UL (ref 0–0.2)
IMM GRANULOCYTES NFR BLD AUTO: 1 % (ref 0–2)
INR PPP: 1.03 (ref 0.84–1.19)
IRON SATN MFR SERPL: 20 %
IRON SERPL-MCNC: 65 UG/DL (ref 50–170)
LDLC SERPL CALC-MCNC: 168 MG/DL (ref 0–100)
LYMPHOCYTES # BLD AUTO: 3.32 THOUSANDS/ΜL (ref 0.6–4.47)
LYMPHOCYTES NFR BLD AUTO: 44 % (ref 14–44)
MCH RBC QN AUTO: 26.6 PG (ref 26.8–34.3)
MCHC RBC AUTO-ENTMCNC: 31 G/DL (ref 31.4–37.4)
MCV RBC AUTO: 86 FL (ref 82–98)
MONOCYTES # BLD AUTO: 0.48 THOUSAND/ΜL (ref 0.17–1.22)
MONOCYTES NFR BLD AUTO: 6 % (ref 4–12)
NEUTROPHILS # BLD AUTO: 3.41 THOUSANDS/ΜL (ref 1.85–7.62)
NEUTS SEG NFR BLD AUTO: 44 % (ref 43–75)
NONHDLC SERPL-MCNC: 203 MG/DL
NRBC BLD AUTO-RTO: 0 /100 WBCS
PLATELET # BLD AUTO: 289 THOUSANDS/UL (ref 149–390)
PMV BLD AUTO: 10.1 FL (ref 8.9–12.7)
PROLACTIN SERPL-MCNC: 6.8 NG/ML
PROTHROMBIN TIME: 13.3 SECONDS (ref 11.6–14.5)
RBC # BLD AUTO: 4.48 MILLION/UL (ref 3.81–5.12)
TIBC SERPL-MCNC: 323 UG/DL (ref 250–450)
TRIGL SERPL-MCNC: 175 MG/DL
VIT B12 SERPL-MCNC: 281 PG/ML (ref 100–900)
WBC # BLD AUTO: 7.56 THOUSAND/UL (ref 4.31–10.16)

## 2021-08-08 PROCEDURE — 83036 HEMOGLOBIN GLYCOSYLATED A1C: CPT

## 2021-08-08 PROCEDURE — 85730 THROMBOPLASTIN TIME PARTIAL: CPT | Performed by: FAMILY MEDICINE

## 2021-08-08 PROCEDURE — 82607 VITAMIN B-12: CPT

## 2021-08-08 PROCEDURE — 85025 COMPLETE CBC W/AUTO DIFF WBC: CPT

## 2021-08-08 PROCEDURE — 84146 ASSAY OF PROLACTIN: CPT

## 2021-08-08 PROCEDURE — 83540 ASSAY OF IRON: CPT

## 2021-08-08 PROCEDURE — 85610 PROTHROMBIN TIME: CPT

## 2021-08-08 PROCEDURE — 80061 LIPID PANEL: CPT | Performed by: FAMILY MEDICINE

## 2021-08-08 PROCEDURE — 36415 COLL VENOUS BLD VENIPUNCTURE: CPT | Performed by: FAMILY MEDICINE

## 2021-08-08 PROCEDURE — 82728 ASSAY OF FERRITIN: CPT

## 2021-08-08 PROCEDURE — 83550 IRON BINDING TEST: CPT

## 2021-08-09 NOTE — RESULT ENCOUNTER NOTE
Call patient to notify normal results labs are really good her prolactin level is the lowest it has been in 6 years her B12 level is good iron saturation lowest limits of normal hemoglobin A1c is excellent blood count is good her hemoglobin is 11 9 however she does have microcytic hypochromic red blood cell indices is so she probably needs to continue iron for another 4-6 weeks and I think at that point she should be done

## 2021-08-20 ENCOUNTER — TELEPHONE (OUTPATIENT)
Dept: SURGERY | Facility: HOSPITAL | Age: 36
End: 2021-08-20

## 2021-08-23 ENCOUNTER — ANESTHESIA EVENT (OUTPATIENT)
Dept: ANESTHESIOLOGY | Facility: HOSPITAL | Age: 36
End: 2021-08-23

## 2021-08-23 ENCOUNTER — ANESTHESIA (OUTPATIENT)
Dept: ANESTHESIOLOGY | Facility: HOSPITAL | Age: 36
End: 2021-08-23

## 2021-08-23 ENCOUNTER — TELEPHONE (OUTPATIENT)
Dept: SURGERY | Facility: HOSPITAL | Age: 36
End: 2021-08-23

## 2021-08-23 RX ORDER — SODIUM CHLORIDE, SODIUM LACTATE, POTASSIUM CHLORIDE, CALCIUM CHLORIDE 600; 310; 30; 20 MG/100ML; MG/100ML; MG/100ML; MG/100ML
125 INJECTION, SOLUTION INTRAVENOUS CONTINUOUS
Status: CANCELLED | OUTPATIENT
Start: 2021-08-23

## 2021-08-23 RX ORDER — LIDOCAINE HYDROCHLORIDE 10 MG/ML
0.5 INJECTION, SOLUTION EPIDURAL; INFILTRATION; INTRACAUDAL; PERINEURAL ONCE AS NEEDED
Status: CANCELLED | OUTPATIENT
Start: 2021-08-23

## 2021-08-24 ENCOUNTER — HOSPITAL ENCOUNTER (OUTPATIENT)
Dept: PERIOP | Facility: HOSPITAL | Age: 36
Setting detail: OUTPATIENT SURGERY
Discharge: HOME | End: 2021-08-24
Admitting: NURSE PRACTITIONER
Payer: COMMERCIAL

## 2021-08-24 ENCOUNTER — ANESTHESIA (OUTPATIENT)
Dept: PERIOP | Facility: HOSPITAL | Age: 36
End: 2021-08-24

## 2021-08-24 ENCOUNTER — ANESTHESIA EVENT (OUTPATIENT)
Dept: PERIOP | Facility: HOSPITAL | Age: 36
End: 2021-08-24

## 2021-08-24 ENCOUNTER — TELEPHONE (OUTPATIENT)
Dept: SURGERY | Facility: HOSPITAL | Age: 36
End: 2021-08-24

## 2021-08-24 VITALS
OXYGEN SATURATION: 97 % | TEMPERATURE: 98 F | RESPIRATION RATE: 18 BRPM | HEART RATE: 72 BPM | SYSTOLIC BLOOD PRESSURE: 123 MMHG | DIASTOLIC BLOOD PRESSURE: 76 MMHG

## 2021-08-24 DIAGNOSIS — K21.00 GASTROESOPHAGEAL REFLUX DISEASE WITH ESOPHAGITIS, UNSPECIFIED WHETHER HEMORRHAGE: ICD-10-CM

## 2021-08-24 LAB
EXT PREGNANCY TEST URINE: NEGATIVE
EXT. CONTROL: NORMAL

## 2021-08-24 PROCEDURE — 88305 TISSUE EXAM BY PATHOLOGIST: CPT | Performed by: PATHOLOGY

## 2021-08-24 PROCEDURE — 81025 URINE PREGNANCY TEST: CPT | Performed by: ANESTHESIOLOGY

## 2021-08-24 PROCEDURE — 88342 IMHCHEM/IMCYTCHM 1ST ANTB: CPT | Performed by: PATHOLOGY

## 2021-08-24 PROCEDURE — 43239 EGD BIOPSY SINGLE/MULTIPLE: CPT | Performed by: INTERNAL MEDICINE

## 2021-08-24 RX ORDER — LIDOCAINE HYDROCHLORIDE 20 MG/ML
INJECTION, SOLUTION EPIDURAL; INFILTRATION; INTRACAUDAL; PERINEURAL AS NEEDED
Status: DISCONTINUED | OUTPATIENT
Start: 2021-08-24 | End: 2021-08-24

## 2021-08-24 RX ORDER — SODIUM CHLORIDE, SODIUM LACTATE, POTASSIUM CHLORIDE, CALCIUM CHLORIDE 600; 310; 30; 20 MG/100ML; MG/100ML; MG/100ML; MG/100ML
125 INJECTION, SOLUTION INTRAVENOUS CONTINUOUS
Status: DISCONTINUED | OUTPATIENT
Start: 2021-08-24 | End: 2021-08-28 | Stop reason: HOSPADM

## 2021-08-24 RX ORDER — PROPOFOL 10 MG/ML
INJECTION, EMULSION INTRAVENOUS AS NEEDED
Status: DISCONTINUED | OUTPATIENT
Start: 2021-08-24 | End: 2021-08-24

## 2021-08-24 RX ORDER — LIDOCAINE HYDROCHLORIDE 10 MG/ML
0.5 INJECTION, SOLUTION EPIDURAL; INFILTRATION; INTRACAUDAL; PERINEURAL ONCE AS NEEDED
Status: DISCONTINUED | OUTPATIENT
Start: 2021-08-24 | End: 2021-08-28 | Stop reason: HOSPADM

## 2021-08-24 RX ORDER — SIMETHICONE 20 MG/.3ML
EMULSION ORAL CODE/TRAUMA/SEDATION MEDICATION
Status: COMPLETED | OUTPATIENT
Start: 2021-08-24 | End: 2021-08-24

## 2021-08-24 RX ORDER — CHOLECALCIFEROL (VITAMIN D3) 125 MCG
5 CAPSULE ORAL DAILY
COMMUNITY

## 2021-08-24 RX ORDER — ONDANSETRON 2 MG/ML
4 INJECTION INTRAMUSCULAR; INTRAVENOUS ONCE AS NEEDED
Status: DISCONTINUED | OUTPATIENT
Start: 2021-08-24 | End: 2021-08-28 | Stop reason: HOSPADM

## 2021-08-24 RX ADMIN — PROPOFOL 50 MG: 10 INJECTION, EMULSION INTRAVENOUS at 10:28

## 2021-08-24 RX ADMIN — SODIUM CHLORIDE, SODIUM LACTATE, POTASSIUM CHLORIDE, AND CALCIUM CHLORIDE 125 ML/HR: .6; .31; .03; .02 INJECTION, SOLUTION INTRAVENOUS at 09:26

## 2021-08-24 RX ADMIN — PROPOFOL 50 MG: 10 INJECTION, EMULSION INTRAVENOUS at 10:23

## 2021-08-24 RX ADMIN — Medication 40 MG: at 10:24

## 2021-08-24 RX ADMIN — PROPOFOL 50 MG: 10 INJECTION, EMULSION INTRAVENOUS at 10:25

## 2021-08-24 RX ADMIN — PROPOFOL 100 MG: 10 INJECTION, EMULSION INTRAVENOUS at 10:20

## 2021-08-24 RX ADMIN — PROPOFOL 50 MG: 10 INJECTION, EMULSION INTRAVENOUS at 10:22

## 2021-08-24 RX ADMIN — PROPOFOL 50 MG: 10 INJECTION, EMULSION INTRAVENOUS at 10:30

## 2021-08-24 RX ADMIN — LIDOCAINE HYDROCHLORIDE 100 MG: 20 INJECTION, SOLUTION EPIDURAL; INFILTRATION; INTRACAUDAL at 10:20

## 2021-08-24 NOTE — INTERVAL H&P NOTE
H&P reviewed  After examining the patient I find no changes in the patients condition since the H&P had been written      Vitals:    08/24/21 0919   BP: 110/68   Pulse: 77   Resp: 18   Temp: (!) 97 °F (36 1 °C)   SpO2: 97%

## 2021-08-24 NOTE — ANESTHESIA PREPROCEDURE EVALUATION
Procedure:  EGD  S/P Gastric Sleeve surgery  2017  SAVD 2 months ago  Not breast feeding  Relevant Problems   GI/HEPATIC   (+) Gastroesophageal reflux disease      HEMATOLOGY   (+) Anemia due to vitamin B12 deficiency      MUSCULOSKELETAL   (+) Rheumatoid arthritis of multiple sites with negative rheumatoid factor (HCC)      NEURO/PSYCH   (+) Anxiety   (+) Depression   Arthritis much improved  Anesthesia Plan  ASA Score- 3     Anesthesia Type- IV sedation with anesthesia with ASA Monitors  Additional Monitors:   Airway Plan:           Plan Factors-    Chart reviewed  Patient is not a current smoker  Patient not instructed to abstain from smoking on day of procedure  Patient did not smoke on day of surgery  Induction- intravenous  Postoperative Plan-     Informed Consent- Anesthetic plan and risks discussed with patient  I personally reviewed this patient with the CRNA  Discussed and agreed on the Anesthesia Plan with the KEI Lugo Procedure:  EGD    Relevant Problems   GI/HEPATIC   (+) Gastroesophageal reflux disease      HEMATOLOGY   (+) Anemia due to vitamin B12 deficiency      MUSCULOSKELETAL   (+) Rheumatoid arthritis of multiple sites with negative rheumatoid factor (HCC)      NEURO/PSYCH   (+) Anxiety   (+) Depression               Anesthesia Plan  ASA Score- 2     Anesthesia Type- IV sedation with anesthesia with ASA Monitors  Additional Monitors:   Airway Plan:           Plan Factors-Exercise tolerance (METS): >4 METS  Chart reviewed  Patient is not a current smoker  Patient not instructed to abstain from smoking on day of procedure  Patient did not smoke on day of surgery  Induction- intravenous  Postoperative Plan-     Informed Consent- Anesthetic plan and risks discussed with patient  I personally reviewed this patient with the CRNA  Discussed and agreed on the Anesthesia Plan with the KEI Lugo

## 2021-08-24 NOTE — ANESTHESIA PREPROCEDURE EVALUATION
Procedure:  PRE-OP ONLY    Relevant Problems   GI/HEPATIC   (+) Gastroesophageal reflux disease      HEMATOLOGY   (+) Anemia due to vitamin B12 deficiency      MUSCULOSKELETAL   (+) Rheumatoid arthritis of multiple sites with negative rheumatoid factor (HCC)      NEURO/PSYCH   (+) Anxiety   (+) Depression             Anesthesia Plan  ASA Score- 3     Anesthesia Type- IV sedation with anesthesia with ASA Monitors  Additional Monitors:   Airway Plan:           Plan Factors-    Chart reviewed  Patient is not a current smoker  Patient not instructed to abstain from smoking on day of procedure  Patient did not smoke on day of surgery  Induction- intravenous  Postoperative Plan-     Informed Consent- Anesthetic plan and risks discussed with patient  I personally reviewed this patient with the CRNA  Discussed and agreed on the Anesthesia Plan with the CRNA  Ronaldo Emmanuel

## 2021-08-24 NOTE — ANESTHESIA POSTPROCEDURE EVALUATION
Post-Op Assessment Note    CV Status:  Stable    Pain management: adequate     Mental Status:  Alert and awake   Hydration Status:  Euvolemic   PONV Controlled:  Controlled   Airway Patency:  Patent      Post Op Vitals Reviewed: Yes      Staff: CRNA         No complications documented      BP   107/58   Temp  98 3   Pulse  71   Resp   18   SpO2   98%

## 2021-10-15 ENCOUNTER — OFFICE VISIT (OUTPATIENT)
Dept: GASTROENTEROLOGY | Facility: CLINIC | Age: 36
End: 2021-10-15
Payer: COMMERCIAL

## 2021-10-15 VITALS
BODY MASS INDEX: 32.14 KG/M2 | HEART RATE: 86 BPM | TEMPERATURE: 98 F | SYSTOLIC BLOOD PRESSURE: 105 MMHG | HEIGHT: 66 IN | DIASTOLIC BLOOD PRESSURE: 74 MMHG | WEIGHT: 200 LBS

## 2021-10-15 DIAGNOSIS — R13.19 ESOPHAGEAL DYSPHAGIA: Primary | ICD-10-CM

## 2021-10-15 DIAGNOSIS — K21.00 GASTROESOPHAGEAL REFLUX DISEASE WITH ESOPHAGITIS, UNSPECIFIED WHETHER HEMORRHAGE: ICD-10-CM

## 2021-10-15 PROCEDURE — 99214 OFFICE O/P EST MOD 30 MIN: CPT | Performed by: NURSE PRACTITIONER

## 2021-10-26 ENCOUNTER — OFFICE VISIT (OUTPATIENT)
Dept: FAMILY MEDICINE CLINIC | Facility: CLINIC | Age: 36
End: 2021-10-26
Payer: COMMERCIAL

## 2021-10-26 VITALS
SYSTOLIC BLOOD PRESSURE: 110 MMHG | TEMPERATURE: 97.3 F | DIASTOLIC BLOOD PRESSURE: 66 MMHG | BODY MASS INDEX: 33.75 KG/M2 | HEIGHT: 66 IN | WEIGHT: 210 LBS | HEART RATE: 88 BPM | OXYGEN SATURATION: 98 %

## 2021-10-26 DIAGNOSIS — F41.1 GENERALIZED ANXIETY DISORDER: Primary | ICD-10-CM

## 2021-10-26 PROCEDURE — 99214 OFFICE O/P EST MOD 30 MIN: CPT | Performed by: PHYSICIAN ASSISTANT

## 2021-10-26 PROCEDURE — 1036F TOBACCO NON-USER: CPT | Performed by: PHYSICIAN ASSISTANT

## 2021-10-26 PROCEDURE — 3008F BODY MASS INDEX DOCD: CPT | Performed by: PHYSICIAN ASSISTANT

## 2021-10-26 RX ORDER — SERTRALINE HYDROCHLORIDE 25 MG/1
25 TABLET, FILM COATED ORAL DAILY
Qty: 30 TABLET | Refills: 1 | Status: SHIPPED | OUTPATIENT
Start: 2021-10-26 | End: 2021-11-09 | Stop reason: ALTCHOICE

## 2021-11-09 DIAGNOSIS — F41.1 GENERALIZED ANXIETY DISORDER: Primary | ICD-10-CM

## 2021-11-09 RX ORDER — ESCITALOPRAM OXALATE 5 MG/1
5 TABLET ORAL DAILY
Qty: 30 TABLET | Refills: 0 | Status: SHIPPED | OUTPATIENT
Start: 2021-11-09 | End: 2021-11-16 | Stop reason: ALTCHOICE

## 2021-11-15 ENCOUNTER — TELEPHONE (OUTPATIENT)
Dept: GASTROENTEROLOGY | Facility: CLINIC | Age: 36
End: 2021-11-15

## 2021-11-16 ENCOUNTER — OFFICE VISIT (OUTPATIENT)
Dept: FAMILY MEDICINE CLINIC | Facility: CLINIC | Age: 36
End: 2021-11-16
Payer: COMMERCIAL

## 2021-11-16 VITALS
BODY MASS INDEX: 33.27 KG/M2 | WEIGHT: 207 LBS | SYSTOLIC BLOOD PRESSURE: 122 MMHG | TEMPERATURE: 96.8 F | DIASTOLIC BLOOD PRESSURE: 78 MMHG | HEIGHT: 66 IN

## 2021-11-16 DIAGNOSIS — F33.9 RECURRENT MAJOR DEPRESSIVE DISORDER, REMISSION STATUS UNSPECIFIED (HCC): ICD-10-CM

## 2021-11-16 DIAGNOSIS — F41.9 ANXIETY: Primary | ICD-10-CM

## 2021-11-16 PROCEDURE — 3008F BODY MASS INDEX DOCD: CPT | Performed by: FAMILY MEDICINE

## 2021-11-16 PROCEDURE — 99213 OFFICE O/P EST LOW 20 MIN: CPT | Performed by: FAMILY MEDICINE

## 2021-11-16 RX ORDER — CLONAZEPAM 0.5 MG/1
0.5 TABLET ORAL
Qty: 30 TABLET | Refills: 0 | Status: SHIPPED | OUTPATIENT
Start: 2021-11-16 | End: 2021-12-03 | Stop reason: DRUGHIGH

## 2021-11-16 RX ORDER — SERTRALINE HYDROCHLORIDE 25 MG/1
25 TABLET, FILM COATED ORAL DAILY
Qty: 30 TABLET | Refills: 3 | Status: SHIPPED | OUTPATIENT
Start: 2021-11-16 | End: 2022-02-10 | Stop reason: DRUGHIGH

## 2021-12-03 DIAGNOSIS — F41.9 ANXIETY: Primary | ICD-10-CM

## 2021-12-03 RX ORDER — CLONAZEPAM 1 MG/1
1 TABLET ORAL
Qty: 30 TABLET | Refills: 0 | Status: SHIPPED | OUTPATIENT
Start: 2021-12-03 | End: 2021-12-29 | Stop reason: SDUPTHER

## 2021-12-28 NOTE — RESULT ENCOUNTER NOTE
Please call the patient regarding her abnormal result    Vitamin-D is a little low thyroid is good cholesterol is elevated slightly I think we already called about this 28-Dec-2021

## 2021-12-29 DIAGNOSIS — F41.9 ANXIETY: ICD-10-CM

## 2021-12-29 RX ORDER — CLONAZEPAM 1 MG/1
1 TABLET ORAL
Qty: 30 TABLET | Refills: 0 | Status: SHIPPED | OUTPATIENT
Start: 2021-12-29 | End: 2022-02-01 | Stop reason: SDUPTHER

## 2022-02-01 DIAGNOSIS — F41.9 ANXIETY: ICD-10-CM

## 2022-02-01 RX ORDER — CLONAZEPAM 1 MG/1
1 TABLET ORAL
Qty: 30 TABLET | Refills: 0 | Status: SHIPPED | OUTPATIENT
Start: 2022-02-01 | End: 2022-02-10 | Stop reason: SDUPTHER

## 2022-02-10 ENCOUNTER — OFFICE VISIT (OUTPATIENT)
Dept: FAMILY MEDICINE CLINIC | Facility: CLINIC | Age: 37
End: 2022-02-10
Payer: COMMERCIAL

## 2022-02-10 VITALS
WEIGHT: 200 LBS | HEIGHT: 66 IN | TEMPERATURE: 96 F | BODY MASS INDEX: 32.14 KG/M2 | SYSTOLIC BLOOD PRESSURE: 118 MMHG | DIASTOLIC BLOOD PRESSURE: 86 MMHG

## 2022-02-10 DIAGNOSIS — N93.8 DYSFUNCTIONAL UTERINE BLEEDING: ICD-10-CM

## 2022-02-10 DIAGNOSIS — F41.9 ANXIETY: ICD-10-CM

## 2022-02-10 DIAGNOSIS — D35.2 PITUITARY MICROADENOMA (HCC): ICD-10-CM

## 2022-02-10 DIAGNOSIS — F41.1 GENERALIZED ANXIETY DISORDER: ICD-10-CM

## 2022-02-10 DIAGNOSIS — F33.9 RECURRENT MAJOR DEPRESSIVE DISORDER, REMISSION STATUS UNSPECIFIED (HCC): Primary | ICD-10-CM

## 2022-02-10 DIAGNOSIS — F32.1 MODERATE MAJOR DEPRESSIVE DISORDER WITH ANXIETY SINGLE EPISODE (HCC): ICD-10-CM

## 2022-02-10 DIAGNOSIS — F41.8 MODERATE MAJOR DEPRESSIVE DISORDER WITH ANXIETY SINGLE EPISODE (HCC): ICD-10-CM

## 2022-02-10 PROCEDURE — 3725F SCREEN DEPRESSION PERFORMED: CPT | Performed by: FAMILY MEDICINE

## 2022-02-10 PROCEDURE — 1036F TOBACCO NON-USER: CPT | Performed by: FAMILY MEDICINE

## 2022-02-10 PROCEDURE — 99213 OFFICE O/P EST LOW 20 MIN: CPT | Performed by: FAMILY MEDICINE

## 2022-02-10 RX ORDER — CLONAZEPAM 1 MG/1
1 TABLET ORAL
Qty: 30 TABLET | Refills: 0 | Status: SHIPPED | OUTPATIENT
Start: 2022-02-10 | End: 2022-04-01 | Stop reason: SDUPTHER

## 2022-02-10 NOTE — PROGRESS NOTES
Assessment/Plan:increase  Sertraline to 50 mg  Patient declines counselling but no suicidal ideation    Problem List Items Addressed This Visit        Other    Anxiety    Depression - Primary           Diagnoses and all orders for this visit:    Recurrent major depressive disorder, remission status unspecified ([address] )    Anxiety    Other orders  -     ASHWAGANDHA PO        No problem-specific Assessment & Plan notes found for this encounter  Subjective:      Patient ID: Sonny Boothe is a 39 y o  female  Mrs Sachi williamson is here today for a follow-up of visit this will be to satisfy her 90 day requirement for her Klonopin but she states that she thinks that she is becoming resistant to it because she gets to sleep okay but then she can not stay asleep on she score 9 on her PHQ but she is not suicidal she does have issues with early morning awakening and also with on being able to stay on task she is an  for [address] and 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 she is also the mother of to and both of her children are in their toddler or infancy stages of life      The following portions of the patient's history were reviewed and updated as appropriate:   She has a past medical history of Abnormal LFTs (liver function tests), Adalimumab (Humira) long-term use, Affective disorder ([address] ), Anxiety, Arthritis, Aspiration into airway, Benign neoplasm of pituitary gland ([address] ), Chondromalacia, patella, Common migraine without aura, Depression with anxiety, Elevated C-reactive protein, Exertional dyspnea, GERD (gastroesophageal reflux disease), Hemicrania continua, High protein C activity level, Hyperprolactinemia ([address] ), Long-term use of hydroxychloroquine, Migraine, Polyarthritis, Polycystic ovarian disease, Prediabetes, Rheumatoid arthritis ([address] ), Seronegative rheumatoid arthritis ([address] ), TMJ (dislocation of temporomandibular joint), Trochanteric bursitis of right hip, and Vitamin D deficiency  ,  does not have any pertinent problems on file  ,   has a past surgical history that includes [place] tooth extraction; Tonsillectomy; Nasal septum surgery; [place] tooth extraction; Nasal septum surgery; Tonsillectomy; Dental surgery; Gastrectomy; and Tooth extraction  ,  family history includes Arthritis in her mother; Cancer in her paternal grandfather; Colon cancer in her family; Coronary artery disease in her family; Diabetes in her family and paternal grandmother; Lung disease in her maternal grandfather; No Known Problems in her father; Psoriasis in her mother  ,   reports that she has never smoked  She has never used smokeless tobacco  She reports previous alcohol use  She reports that she does not use drugs  ,  is allergic to augmentin [amoxicillin-pot clavulanate], nsaids, and pollen extract     Current Outpatient Medications   Medication Sig Dispense Refill    ASHWAGANDHA PO       Cholecalciferol (REPLESTA PO) Take 10,000 Units by mouth once a week       clonazePAM (KlonoPIN) 1 mg tablet Take 1 tablet (1 mg total) by mouth daily at bedtime 30 tablet 0    cyanocobalamin 1,000 mcg/mL Inject IM every 30 days x 6 months 1 mL 5    levonorgestrel-ethinyl estradiol (AVIANE,ALESSE,LESSINA) 0 1-20 MG-MCG per tablet Take 1 tablet by mouth daily   Melatonin 5 MG TABS Take 5 mg by mouth daily      sertraline (ZOLOFT) 25 mg tablet Take 1 tablet (25 mg total) by mouth daily 30 tablet 3    SUMAtriptan Succinate 6 MG/0 5ML SOAJ Inject 0 5mL at onset of headache and may repeat in 2 hours, MAX 2 inject a day MAX 3 days/week 9 Cartridge 3     No current facility-administered medications for this visit  Review of Systems   Constitutional: Negative for activity change, appetite change, diaphoresis, fatigue and fever  HENT: Negative  Negative for dental problem  Eyes: Negative      Respiratory: Negative for apnea, cough, chest tightness, shortness of breath and wheezing  Cardiovascular: Negative for chest pain, palpitations and leg swelling  Gastrointestinal: Negative for abdominal distention, abdominal pain, anal bleeding, constipation, diarrhea, nausea and vomiting  Endocrine: Negative for cold intolerance, heat intolerance, polydipsia, polyphagia and polyuria  Genitourinary: Positive for vaginal bleeding  Negative for difficulty urinating, dysuria, flank pain, hematuria and urgency  Musculoskeletal: Negative for arthralgias, back pain, gait problem, joint swelling and myalgias  Skin: Negative for color change, rash and wound  Allergic/Immunologic: Negative for environmental allergies, food allergies and immunocompromised state  Neurological: Negative for dizziness, seizures, syncope, speech difficulty, numbness and headaches  Hematological: Negative for adenopathy  Does not bruise/bleed easily  Psychiatric/Behavioral: Negative for agitation, behavioral problems, hallucinations, sleep disturbance and suicidal ideas  Objective:  Vitals:    02/10/22 0919   BP: 118/86   BP Location: Left arm   Patient Position: Sitting   Cuff Size: Large   Temp: (!) 96 °F (35 6 °C)   TempSrc: Temporal   Weight: 90 7 kg (200 lb)   Height: 5' 6" (1 676 m)     Body mass index is 32 28 kg/m²  Physical Exam  Constitutional:       General: She is not in acute distress  Appearance: She is well-developed  She is not diaphoretic  HENT:      Head: Normocephalic  Right Ear: External ear normal       Left Ear: External ear normal       Nose: Nose normal    Eyes:      General: No scleral icterus  Right eye: No discharge  Left eye: No discharge  Conjunctiva/sclera: Conjunctivae normal       Pupils: Pupils are equal, round, and reactive to light  Neck:      Thyroid: No thyromegaly  Trachea: No tracheal deviation  Cardiovascular:      Rate and Rhythm: Normal rate and regular rhythm  Heart sounds: Normal heart sounds  No murmur heard  No friction rub  No gallop  Pulmonary:      Effort: Pulmonary effort is normal  No respiratory distress  Breath sounds: Normal breath sounds  No wheezing  Abdominal:      General: Bowel sounds are normal       Palpations: Abdomen is soft  There is no mass  Tenderness: There is no abdominal tenderness  There is no guarding  Musculoskeletal:         General: No deformity  Cervical back: Normal range of motion  Lymphadenopathy:      Cervical: No cervical adenopathy  Skin:     General: Skin is warm and dry  Findings: No erythema or rash  Neurological:      Mental Status: She is alert and oriented to person, place, and time  Cranial Nerves: No cranial nerve deficit  Psychiatric:         Thought Content:  Thought content normal

## 2022-02-11 ENCOUNTER — LAB (OUTPATIENT)
Dept: LAB | Facility: HOSPITAL | Age: 37
End: 2022-02-11
Attending: FAMILY MEDICINE
Payer: COMMERCIAL

## 2022-02-11 DIAGNOSIS — D35.2 PITUITARY MICROADENOMA (HCC): ICD-10-CM

## 2022-02-11 DIAGNOSIS — N93.8 DYSFUNCTIONAL UTERINE BLEEDING: ICD-10-CM

## 2022-02-11 LAB
PROLACTIN SERPL-MCNC: 8.5 NG/ML
TSH SERPL DL<=0.05 MIU/L-ACNC: 1.27 UIU/ML (ref 0.36–3.74)

## 2022-02-11 PROCEDURE — 84146 ASSAY OF PROLACTIN: CPT

## 2022-02-11 PROCEDURE — 36415 COLL VENOUS BLD VENIPUNCTURE: CPT

## 2022-02-11 PROCEDURE — 84443 ASSAY THYROID STIM HORMONE: CPT

## 2022-02-14 DIAGNOSIS — G43.109 MIGRAINE WITH AURA AND WITHOUT STATUS MIGRAINOSUS, NOT INTRACTABLE: ICD-10-CM

## 2022-02-14 RX ORDER — CEFUROXIME AXETIL 250 MG/1
TABLET ORAL
Qty: 1 ML | Refills: 2 | Status: SHIPPED | OUTPATIENT
Start: 2022-02-14

## 2022-02-28 NOTE — TELEPHONE ENCOUNTER
I believe this patient is trying to get pregnant or is pregnant and sumatriptan is contraindicated
Stomach cramps since saturday. Denies n/v/d.

## 2022-04-01 DIAGNOSIS — F41.9 ANXIETY: ICD-10-CM

## 2022-04-01 RX ORDER — CLONAZEPAM 1 MG/1
1 TABLET ORAL
Qty: 30 TABLET | Refills: 0 | Status: SHIPPED | OUTPATIENT
Start: 2022-04-01 | End: 2022-05-05 | Stop reason: SDUPTHER

## 2022-04-11 ENCOUNTER — HOSPITAL ENCOUNTER (EMERGENCY)
Facility: HOSPITAL | Age: 37
Discharge: HOME | End: 2022-04-11
Attending: EMERGENCY MEDICINE
Payer: COMMERCIAL

## 2022-04-11 ENCOUNTER — APPOINTMENT (EMERGENCY)
Dept: CT IMAGING | Facility: HOSPITAL | Age: 37
End: 2022-04-11
Payer: COMMERCIAL

## 2022-04-11 VITALS
BODY MASS INDEX: 31.63 KG/M2 | HEART RATE: 77 BPM | RESPIRATION RATE: 16 BRPM | OXYGEN SATURATION: 99 % | WEIGHT: 195.99 LBS | SYSTOLIC BLOOD PRESSURE: 142 MMHG | DIASTOLIC BLOOD PRESSURE: 95 MMHG | TEMPERATURE: 96 F

## 2022-04-11 DIAGNOSIS — E87.6 HYPOKALEMIA: ICD-10-CM

## 2022-04-11 DIAGNOSIS — R19.7 DIARRHEA: Primary | ICD-10-CM

## 2022-04-11 DIAGNOSIS — K52.9 GASTROENTERITIS: ICD-10-CM

## 2022-04-11 LAB
ALBUMIN SERPL BCP-MCNC: 4.2 G/DL (ref 3.5–5)
ALP SERPL-CCNC: 84 U/L (ref 46–116)
ALT SERPL W P-5'-P-CCNC: 21 U/L (ref 12–78)
ANION GAP SERPL CALCULATED.3IONS-SCNC: 15 MMOL/L (ref 4–13)
AST SERPL W P-5'-P-CCNC: 16 U/L (ref 5–45)
BACTERIA UR QL AUTO: ABNORMAL /HPF
BASOPHILS # BLD AUTO: 0.04 THOUSANDS/ΜL (ref 0–0.1)
BASOPHILS NFR BLD AUTO: 0 % (ref 0–1)
BILIRUB SERPL-MCNC: 0.36 MG/DL (ref 0.2–1)
BILIRUB UR QL STRIP: ABNORMAL
BUN SERPL-MCNC: 9 MG/DL (ref 5–25)
CALCIUM SERPL-MCNC: 9.1 MG/DL (ref 8.3–10.1)
CHLORIDE SERPL-SCNC: 107 MMOL/L (ref 100–108)
CLARITY UR: CLEAR
CO2 SERPL-SCNC: 21 MMOL/L (ref 21–32)
COLOR UR: YELLOW
CREAT SERPL-MCNC: 0.88 MG/DL (ref 0.6–1.3)
EOSINOPHIL # BLD AUTO: 0.06 THOUSAND/ΜL (ref 0–0.61)
EOSINOPHIL NFR BLD AUTO: 1 % (ref 0–6)
ERYTHROCYTE [DISTWIDTH] IN BLOOD BY AUTOMATED COUNT: 13.2 % (ref 11.6–15.1)
EXT PREG TEST URINE: NORMAL
EXT. CONTROL ED NAV: NORMAL
GFR SERPL CREATININE-BSD FRML MDRD: 84 ML/MIN/1.73SQ M
GLUCOSE SERPL-MCNC: 108 MG/DL (ref 65–140)
GLUCOSE UR STRIP-MCNC: NEGATIVE MG/DL
HCT VFR BLD AUTO: 47.7 % (ref 34.8–46.1)
HGB BLD-MCNC: 15.7 G/DL (ref 11.5–15.4)
HGB UR QL STRIP.AUTO: ABNORMAL
IMM GRANULOCYTES # BLD AUTO: 0.02 THOUSAND/UL (ref 0–0.2)
IMM GRANULOCYTES NFR BLD AUTO: 0 % (ref 0–2)
KETONES UR STRIP-MCNC: NEGATIVE MG/DL
LEUKOCYTE ESTERASE UR QL STRIP: NEGATIVE
LIPASE SERPL-CCNC: 139 U/L (ref 73–393)
LYMPHOCYTES # BLD AUTO: 2.9 THOUSANDS/ΜL (ref 0.6–4.47)
LYMPHOCYTES NFR BLD AUTO: 32 % (ref 14–44)
MCH RBC QN AUTO: 29.1 PG (ref 26.8–34.3)
MCHC RBC AUTO-ENTMCNC: 32.9 G/DL (ref 31.4–37.4)
MCV RBC AUTO: 89 FL (ref 82–98)
MONOCYTES # BLD AUTO: 0.84 THOUSAND/ΜL (ref 0.17–1.22)
MONOCYTES NFR BLD AUTO: 9 % (ref 4–12)
MUCOUS THREADS UR QL AUTO: ABNORMAL
NEUTROPHILS # BLD AUTO: 5.17 THOUSANDS/ΜL (ref 1.85–7.62)
NEUTS SEG NFR BLD AUTO: 58 % (ref 43–75)
NITRITE UR QL STRIP: NEGATIVE
NON-SQ EPI CELLS URNS QL MICRO: ABNORMAL /HPF
NRBC BLD AUTO-RTO: 0 /100 WBCS
PH UR STRIP.AUTO: 5.5 [PH]
PLATELET # BLD AUTO: 346 THOUSANDS/UL (ref 149–390)
PMV BLD AUTO: 11.5 FL (ref 8.9–12.7)
POTASSIUM SERPL-SCNC: 3 MMOL/L (ref 3.5–5.3)
PROT SERPL-MCNC: 8.9 G/DL (ref 6.4–8.2)
PROT UR STRIP-MCNC: ABNORMAL MG/DL
RBC # BLD AUTO: 5.39 MILLION/UL (ref 3.81–5.12)
RBC #/AREA URNS AUTO: ABNORMAL /HPF
SODIUM SERPL-SCNC: 143 MMOL/L (ref 136–145)
SP GR UR STRIP.AUTO: >=1.03 (ref 1–1.03)
UROBILINOGEN UR QL STRIP.AUTO: 0.2 E.U./DL
WBC # BLD AUTO: 9.03 THOUSAND/UL (ref 4.31–10.16)
WBC #/AREA URNS AUTO: ABNORMAL /HPF

## 2022-04-11 PROCEDURE — 80053 COMPREHEN METABOLIC PANEL: CPT | Performed by: EMERGENCY MEDICINE

## 2022-04-11 PROCEDURE — 85025 COMPLETE CBC W/AUTO DIFF WBC: CPT | Performed by: EMERGENCY MEDICINE

## 2022-04-11 PROCEDURE — 96365 THER/PROPH/DIAG IV INF INIT: CPT

## 2022-04-11 PROCEDURE — 81025 URINE PREGNANCY TEST: CPT | Performed by: EMERGENCY MEDICINE

## 2022-04-11 PROCEDURE — 96375 TX/PRO/DX INJ NEW DRUG ADDON: CPT

## 2022-04-11 PROCEDURE — 36415 COLL VENOUS BLD VENIPUNCTURE: CPT | Performed by: EMERGENCY MEDICINE

## 2022-04-11 PROCEDURE — 81001 URINALYSIS AUTO W/SCOPE: CPT | Performed by: EMERGENCY MEDICINE

## 2022-04-11 PROCEDURE — 99284 EMERGENCY DEPT VISIT MOD MDM: CPT

## 2022-04-11 PROCEDURE — 96376 TX/PRO/DX INJ SAME DRUG ADON: CPT

## 2022-04-11 PROCEDURE — 96366 THER/PROPH/DIAG IV INF ADDON: CPT

## 2022-04-11 PROCEDURE — 99284 EMERGENCY DEPT VISIT MOD MDM: CPT | Performed by: EMERGENCY MEDICINE

## 2022-04-11 PROCEDURE — G1004 CDSM NDSC: HCPCS

## 2022-04-11 PROCEDURE — 83690 ASSAY OF LIPASE: CPT | Performed by: EMERGENCY MEDICINE

## 2022-04-11 PROCEDURE — 74177 CT ABD & PELVIS W/CONTRAST: CPT

## 2022-04-11 RX ORDER — ONDANSETRON 2 MG/ML
4 INJECTION INTRAMUSCULAR; INTRAVENOUS ONCE
Status: COMPLETED | OUTPATIENT
Start: 2022-04-11 | End: 2022-04-11

## 2022-04-11 RX ORDER — POTASSIUM CHLORIDE 20 MEQ/1
40 TABLET, EXTENDED RELEASE ORAL ONCE
Status: COMPLETED | OUTPATIENT
Start: 2022-04-11 | End: 2022-04-11

## 2022-04-11 RX ORDER — ONDANSETRON 4 MG/1
4 TABLET, ORALLY DISINTEGRATING ORAL EVERY 6 HOURS PRN
Qty: 20 TABLET | Refills: 0 | Status: SHIPPED | OUTPATIENT
Start: 2022-04-11

## 2022-04-11 RX ORDER — DICYCLOMINE HCL 20 MG
20 TABLET ORAL 2 TIMES DAILY
Qty: 20 TABLET | Refills: 0 | Status: SHIPPED | OUTPATIENT
Start: 2022-04-11

## 2022-04-11 RX ADMIN — SODIUM CHLORIDE, SODIUM LACTATE, POTASSIUM CHLORIDE, AND CALCIUM CHLORIDE 1000 ML: .6; .31; .03; .02 INJECTION, SOLUTION INTRAVENOUS at 14:12

## 2022-04-11 RX ADMIN — ONDANSETRON 4 MG: 2 INJECTION INTRAMUSCULAR; INTRAVENOUS at 14:12

## 2022-04-11 RX ADMIN — IOHEXOL 100 ML: 350 INJECTION, SOLUTION INTRAVENOUS at 15:36

## 2022-04-11 RX ADMIN — ONDANSETRON 4 MG: 2 INJECTION INTRAMUSCULAR; INTRAVENOUS at 14:58

## 2022-04-11 RX ADMIN — SODIUM CHLORIDE, SODIUM LACTATE, POTASSIUM CHLORIDE, AND CALCIUM CHLORIDE 1000 ML: .6; .31; .03; .02 INJECTION, SOLUTION INTRAVENOUS at 15:35

## 2022-04-11 RX ADMIN — POTASSIUM CHLORIDE 40 MEQ: 1500 TABLET, EXTENDED RELEASE ORAL at 14:58

## 2022-04-11 NOTE — ED NOTES
Pt medicated for nausea and fluids still running no needs call bell in reach warm blanket given     Earlyne TORY Martinez  04/11/22 [id number]

## 2022-04-11 NOTE — ED PROVIDER NOTES
History  Chief Complaint   Patient presents with    Diarrhea     diarrhea ongoing since friday with LLQ abd pain starting yesterday     39 F presenting with 2-3 days of loose stool and episodes of diarrhea  Nonbloody  No recent travel  No recent antibiotics  Denies fevers or chills  Mild nausea but no vomiting  Patient has had gastric sleeve surgery as well as cholecystectomy  Has had 2 spontaneous vaginal deliveries with last pregnancy approximately 1 year ago  Is having normal menses with last menses approximately 3 weeks ago  No recent food exposures  No other family members have similar symptoms  Diarrhea  Duration:  2 days  Timing:  Intermittent  Associated symptoms: no arthralgias, no chills, no fever, no headaches and no vomiting    Risk factors: no recent antibiotic use, no sick contacts, no suspicious food intake and no travel to endemic areas        Prior to Admission Medications   Prescriptions Last Dose Informant Patient Reported? Taking? ASHWAGANDHA PO 4/10/2022 at Unknown time  Yes Yes   Cholecalciferol (REPLESTA PO) 4/10/2022 at Unknown time Self Yes Yes   Sig: Take 10,000 Units by mouth once a week    Melatonin 5 MG TABS 4/10/2022 at Unknown time  Yes Yes   Sig: Take 5 mg by mouth daily   SUMAtriptan Succinate 6 MG/0 5ML SOAJ Past Month at Unknown time  No Yes   Sig: Inject 0 5mL at onset of headache and may repeat in 2 hours, MAX 2 inject a day MAX 3 days/week   clonazePAM (KlonoPIN) 1 mg tablet 4/10/2022 at Unknown time  No Yes   Sig: Take 1 tablet (1 mg total) by mouth daily at bedtime   cyanocobalamin 1,000 mcg/mL Past Month at Unknown time Self No Yes   Sig: Inject IM every 30 days x 6 months   levonorgestrel-ethinyl estradiol (AVIANE,ALESSE,LESSINA) 0 1-20 MG-MCG per tablet 4/11/2022 at Unknown time Self Yes Yes   Sig: Take 1 tablet by mouth daily     sertraline (Zoloft) 50 mg tablet 4/10/2022 at Unknown time  No Yes   Sig: Take 1 tablet (50 mg total) by mouth daily Facility-Administered Medications: None       Past Medical History:   Diagnosis Date    Abnormal LFTs (liver function tests)     LAST ASSESSED: [id number]    Adalimumab (Humira) long-term use     LAST ASSESSED: [id number]    Affective disorder ([facility])     LAST ASSESSED: [id number]    Anxiety     Arthritis     Aspiration into airway     LAST ASSESSED: [id number]    Benign neoplasm of pituitary gland ([facility])     LAST ASSESSED: 66ACO9333    Chondromalacia, patella     LAST ASSESSED: 40EJF8382    Common migraine without aura     LAST ASSESSED: 47TKM2979    Depression with anxiety     Elevated C-reactive protein     LAST ASSESSED: 35VMP6436    Exertional dyspnea     LAST ASSESSED: 66BIV0655    GERD (gastroesophageal reflux disease)     Hemicrania continua     LAST ASSESSED: 21BHX9733    High protein C activity level     LAST ASSESSED: 72ENQ8332    Hyperprolactinemia ([facility])     LAST ASSESSED: 21NEG7166    Long-term use of hydroxychloroquine     LAST ASSESSED: 46RIV9504    Migraine     LAST ASSESSED: 16SZC6869    Polyarthritis     LAST ASSESSED: 36VNK9255    Polycystic ovarian disease     Prediabetes     LAST ASSESSED: 64CCF7547    Rheumatoid arthritis ([facility])     LAST ASSESSED: 21LOC9382    Seronegative rheumatoid arthritis ([facility])     LAST ASSESSED: 31ZSW9705    TMJ (dislocation of temporomandibular joint)     APPEARANCE LAST ASSESSED: 79HZC1752    Trochanteric bursitis of right hip     LAST ASSESSED: 64VKH2703    Vitamin D deficiency     LAST ASSESSED: 97HNL5343       Past Surgical History:   Procedure Laterality Date    CHOLECYSTECTOMY      DENTAL SURGERY      GASTRECTOMY      SLEEVE RESOLVED: 04ZVS7892    NASAL SEPTUM SURGERY      DEVIATION REPAIR    NASAL SEPTUM SURGERY      TONSILLECTOMY      TONSILLECTOMY      TOOTH EXTRACTION      WISDOM TOOTH EXTRACTION      WISDOM TOOTH EXTRACTION         Family History   Problem Relation Age of Onset    Arthritis Mother     Psoriasis Mother     No Known Problems Father     Lung disease Maternal Grandfather         BLACK    Diabetes Paternal Grandmother     Cancer Paternal Reji Lyme     Coronary artery disease Family     Colon cancer Family     Diabetes Family      I have reviewed and agree with the history as documented  E-Cigarette/Vaping    E-Cigarette Use Never User      E-Cigarette/Vaping Substances     Social History     Tobacco Use    Smoking status: Never Smoker    Smokeless tobacco: Never Used   Vaping Use    Vaping Use: Never used   Substance Use Topics    Alcohol use: Not Currently     Comment: OCCASIONAL - 1 DRINK/MONTH AS PER ALLSCRIPTS    Drug use: No       Review of Systems   Constitutional: Negative for appetite change, chills, fatigue, fever and unexpected weight change  HENT: Negative for congestion, ear pain, rhinorrhea and sore throat  Eyes: Negative for pain and visual disturbance  Respiratory: Negative for cough, chest tightness, shortness of breath and wheezing  Cardiovascular: Negative for chest pain, palpitations and leg swelling  Gastrointestinal: Positive for diarrhea and nausea  Negative for constipation and vomiting  Genitourinary: Negative for difficulty urinating, dysuria, frequency, hematuria, menstrual problem, pelvic pain, vaginal bleeding and vaginal discharge  Musculoskeletal: Negative for arthralgias, back pain and neck pain  Skin: Negative for color change and rash  Neurological: Negative for dizziness, seizures, syncope, light-headedness and headaches  Psychiatric/Behavioral: Negative for sleep disturbance  The patient is not nervous/anxious  All other systems reviewed and are negative  Physical Exam  Physical Exam  Vitals and nursing note reviewed  Constitutional:       General: She is not in acute distress  Appearance: Normal appearance  She is well-developed and normal weight  She is not ill-appearing, toxic-appearing or diaphoretic     HENT:      Head: Normocephalic and atraumatic  Nose: Nose normal       Mouth/Throat:      Mouth: Mucous membranes are moist       Pharynx: Oropharynx is clear  Eyes:      General: No scleral icterus  Extraocular Movements: Extraocular movements intact  Conjunctiva/sclera: Conjunctivae normal    Cardiovascular:      Rate and Rhythm: Normal rate and regular rhythm  Pulses: Normal pulses  Heart sounds: Normal heart sounds  No murmur heard  No friction rub  No gallop  Pulmonary:      Effort: Pulmonary effort is normal  No respiratory distress  Breath sounds: Normal breath sounds  No wheezing or rales  Abdominal:      Palpations: Abdomen is soft  There is no mass  Tenderness: There is no abdominal tenderness  There is no right CVA tenderness, left CVA tenderness, guarding or rebound  Hernia: No hernia is present  Musculoskeletal:         General: No deformity or signs of injury  Normal range of motion  Cervical back: Normal range of motion and neck supple  Right lower leg: No edema  Left lower leg: No edema  Skin:     General: Skin is warm and dry  Capillary Refill: Capillary refill takes less than 2 seconds  Coloration: Skin is not jaundiced or pale  Findings: No erythema, lesion or rash  Neurological:      General: No focal deficit present  Mental Status: She is alert and oriented to person, place, and time     Psychiatric:         Mood and Affect: Mood normal          Behavior: Behavior normal          Vital Signs  ED Triage Vitals [04/11/22 1323]   Temperature Pulse Respirations Blood Pressure SpO2   (!) 96 °F (35 6 °C) (!) 117 18 139/98 97 %      Temp Source Heart Rate Source Patient Position - Orthostatic VS BP Location FiO2 (%)   Temporal Monitor -- -- --      Pain Score       4           Vitals:    04/11/22 1430 04/11/22 1500 04/11/22 1530 04/11/22 1600   BP: 132/92 128/86 141/90 136/93   Pulse: 92 90 95 90         Visual Acuity      ED Medications  Medications   lactated ringers bolus 1,000 mL (1,000 mL Intravenous New Bag 4/11/22 1535)   lactated ringers bolus 1,000 mL (0 mL Intravenous Stopped 4/11/22 1520)   ondansetron (ZOFRAN) injection 4 mg (4 mg Intravenous Given 4/11/22 1412)   ondansetron (ZOFRAN) injection 4 mg (4 mg Intravenous Given 4/11/22 1458)   potassium chloride (K-DUR,KLOR-CON) CR tablet 40 mEq (40 mEq Oral Given 4/11/22 1458)   iohexol (OMNIPAQUE) 350 MG/ML injection (SINGLE-DOSE) 100 mL (100 mL Intravenous Given 4/11/22 1536)       Diagnostic Studies  Results Reviewed     Procedure Component Value Units Date/Time    Comprehensive metabolic panel [[id number]]  (Abnormal) Collected: 04/11/22 1411    Lab Status: Final result Specimen: Blood from Arm, Left Updated: 04/11/22 1437     Sodium 143 mmol/L      Potassium 3 0 mmol/L      Chloride 107 mmol/L      CO2 21 mmol/L      ANION GAP 15 mmol/L      BUN 9 mg/dL      Creatinine 0 88 mg/dL      Glucose 108 mg/dL      Calcium 9 1 mg/dL      AST 16 U/L      ALT 21 U/L      Alkaline Phosphatase 84 U/L      Total Protein 8 9 g/dL      Albumin 4 2 g/dL      Total Bilirubin 0 36 mg/dL      eGFR 84 ml/min/1 73sq m     Narrative:      [place] guidelines for Chronic Kidney Disease (CKD):     Stage 1 with normal or high GFR (GFR > 90 mL/min/1 73 square meters)    Stage 2 Mild CKD (GFR = 60-89 mL/min/1 73 square meters)    Stage 3A Moderate CKD (GFR = 45-59 mL/min/1 73 square meters)    Stage 3B Moderate CKD (GFR = 30-44 mL/min/1 73 square meters)    Stage 4 Severe CKD (GFR = 15-29 mL/min/1 73 square meters)    Stage 5 End Stage CKD (GFR <15 mL/min/1 73 square meters)  Note: GFR calculation is accurate only with a steady state creatinine    Lipase [[id number]]  (Normal) Collected: 04/11/22 1411    Lab Status: Final result Specimen: Blood from Arm, Left Updated: 04/11/22 1437     Lipase 139 u/L     Urine Microscopic [[id number]]  (Abnormal) Collected: 04/11/22 1415 Lab Status: Final result Specimen: Urine, Clean Catch Updated: 04/11/22 1430     RBC, UA 0-1 /hpf      WBC, UA 2-4 /hpf      Epithelial Cells Occasional /hpf      Bacteria, UA Occasional /hpf      MUCUS THREADS Moderate    UA w Reflex to Microscopic w Reflex to Culture [[id number]]  (Abnormal) Collected: 04/11/22 1412    Lab Status: Final result Specimen: Urine, Clean Catch Updated: 04/11/22 1421     Color, UA Yellow     Clarity, UA Clear     Specific Gravity, UA >=1 030     pH, UA 5 5     Leukocytes, UA Negative     Nitrite, UA Negative     Protein, UA 30 (1+) mg/dl      Glucose, UA Negative mg/dl      Ketones, UA Negative mg/dl      Urobilinogen, UA 0 2 E U /dl      Bilirubin, UA Interference- unable to analyze     Blood, UA Trace-Intact    CBC and differential [[id number]]  (Abnormal) Collected: 04/11/22 1411    Lab Status: Final result Specimen: Blood from Arm, Left Updated: 04/11/22 1420     WBC 9 03 Thousand/uL      RBC 5 39 Million/uL      Hemoglobin 15 7 g/dL      Hematocrit 47 7 %      MCV 89 fL      MCH 29 1 pg      MCHC 32 9 g/dL      RDW 13 2 %      MPV 11 5 fL      Platelets 999 Thousands/uL      nRBC 0 /100 WBCs      Neutrophils Relative 58 %      Immat GRANS % 0 %      Lymphocytes Relative 32 %      Monocytes Relative 9 %      Eosinophils Relative 1 %      Basophils Relative 0 %      Neutrophils Absolute 5 17 Thousands/µL      Immature Grans Absolute 0 02 Thousand/uL      Lymphocytes Absolute 2 90 Thousands/µL      Monocytes Absolute 0 84 Thousand/µL      Eosinophils Absolute 0 06 Thousand/µL      Basophils Absolute 0 04 Thousands/µL     POCT pregnancy, urine [[id number]]  (Normal) Resulted: 04/11/22 1412    Lab Status: Final result Updated: 04/11/22 1413     EXT PREG TEST UR (Ref: Negative) neg     Control valid                 CT abdomen pelvis with contrast   Final Result by Angelica Reese MD (04/11 1551)      Fluid throughout mildly distended colon without areas of bowel wall thickening indicating a nonspecific diarrheal illness  The study was marked in [facility] for immediate notification  Workstation performed: [id number]                    Procedures  Procedures         ED Course                                             MDM    Disposition  Final diagnoses:   Diarrhea   Gastroenteritis   Hypokalemia     Time reflects when diagnosis was documented in both MDM as applicable and the Disposition within this note     Time User Action Codes Description Comment    4/11/2022  4:27 PM Nancy Forgan T Add [R19 7] Diarrhea     4/11/2022  4:27 PM Wesson Brilliant T Add [K52 9] Gastroenteritis     4/11/2022  4:29 PM Wesson Brilliant T Add [E87 6] Hypokalemia       ED Disposition     ED Disposition Condition Date/Time Comment    Discharge Stable Mon Apr 11, 2022  4:28 PM [address] discharge to home/self care  Follow-up Information     Follow up With Specialties Details Why Contact Info    Cristofer Sanchez DO Family Medicine Schedule an appointment as soon as possible for a visit   [id number] ([address]) [place] [address]  [phone]            Patient's Medications   Discharge Prescriptions    DICYCLOMINE (BENTYL) 20 MG TABLET    Take 1 tablet (20 mg total) by mouth 2 (two) times a day       Start Date: 4/11/2022 End Date: --       Order Dose: 20 mg       Quantity: 20 tablet    Refills: 0    ONDANSETRON (ZOFRAN ODT) 4 MG DISINTEGRATING TABLET    Take 1 tablet (4 mg total) by mouth every 6 (six) hours as needed for nausea or vomiting       Start Date: 4/11/2022 End Date: --       Order Dose: 4 mg       Quantity: 20 tablet    Refills: 0       No discharge procedures on file      PDMP Review       Value Time User    PDMP Reviewed  Yes 12/29/2021  9:55 AM Cristofer Sanchez DO          ED Provider  Electronically Signed by           Edu Roman DO  04/11/22 0595

## 2022-04-11 NOTE — ED NOTES
Pt medicated and fluids running labs sent preg negative call bell given      Aime Washington, RN  04/11/22 3644

## 2022-04-11 NOTE — ED NOTES
Pt here states she had diarrhea yesterday and left lower quad pain that comes and goes  States she nyla a cholecystectomy in November and hx of gastric surgery in 2014  Pt denies n/v  Labs collected Iv placed        Ej Lewis RN  04/11/22 4914

## 2022-05-05 DIAGNOSIS — F41.9 ANXIETY: ICD-10-CM

## 2022-05-05 RX ORDER — CLONAZEPAM 1 MG/1
1 TABLET ORAL
Qty: 30 TABLET | Refills: 0 | Status: SHIPPED | OUTPATIENT
Start: 2022-05-05 | End: 2022-06-06 | Stop reason: SDUPTHER

## 2022-05-06 ENCOUNTER — RA CDI HCC (OUTPATIENT)
Dept: OTHER | Facility: HOSPITAL | Age: 37
End: 2022-05-06

## 2022-05-06 NOTE — PROGRESS NOTES
[facility]  coding opportunities       Chart reviewed, no opportunity found: CHART REVIEWED, NO OPPORTUNITY FOUND        Patients Insurance        Commercial Insurance: [employer]

## 2022-05-13 ENCOUNTER — OFFICE VISIT (OUTPATIENT)
Dept: FAMILY MEDICINE CLINIC | Facility: CLINIC | Age: 37
End: 2022-05-13
Payer: COMMERCIAL

## 2022-05-13 VITALS
HEIGHT: 66 IN | TEMPERATURE: 96.8 F | SYSTOLIC BLOOD PRESSURE: 118 MMHG | BODY MASS INDEX: 31.79 KG/M2 | DIASTOLIC BLOOD PRESSURE: 78 MMHG | WEIGHT: 197.8 LBS

## 2022-05-13 DIAGNOSIS — Z00.00 ANNUAL PHYSICAL EXAM: ICD-10-CM

## 2022-05-13 DIAGNOSIS — D51.9 ANEMIA DUE TO VITAMIN B12 DEFICIENCY, UNSPECIFIED B12 DEFICIENCY TYPE: ICD-10-CM

## 2022-05-13 DIAGNOSIS — D35.2 PITUITARY MICROADENOMA WITH HYPERPROLACTINEMIA (HCC): Primary | ICD-10-CM

## 2022-05-13 DIAGNOSIS — E22.9 PITUITARY MICROADENOMA WITH HYPERPROLACTINEMIA (HCC): Primary | ICD-10-CM

## 2022-05-13 PROCEDURE — 3008F BODY MASS INDEX DOCD: CPT | Performed by: FAMILY MEDICINE

## 2022-05-13 PROCEDURE — 1036F TOBACCO NON-USER: CPT | Performed by: FAMILY MEDICINE

## 2022-05-13 PROCEDURE — 99395 PREV VISIT EST AGE 18-39: CPT | Performed by: FAMILY MEDICINE

## 2022-05-13 NOTE — PATIENT INSTRUCTIONS

## 2022-05-13 NOTE — PROGRESS NOTES
[facility]    NAME: Henry Bryant  AGE: 39 y o  SEX: female  : 1985     DATE: 2022     Assessment and Plan:     Problem List Items Addressed This Visit        Other    Anemia due to vitamin B12 deficiency    Relevant Orders    Vitamin B12      Other Visit Diagnoses     Pituitary microadenoma with hyperprolactinemia ([facility] [address] )    -  Primary    Relevant Orders    Prolactin    Annual physical exam              Immunizations and preventive care screenings were discussed with patient today  Appropriate education was printed on patient's after visit summary  Counseling:  Alcohol/drug use: discussed moderation in alcohol intake, the recommendations for healthy alcohol use, and avoidance of illicit drug use  Dental Health: discussed importance of regular tooth brushing, flossing, and dental visits  Injury prevention: discussed safety/seat belts, safety helmets, smoke detectors, carbon dioxide detectors, and smoking near bedding or upholstery  Sexual health: discussed sexually transmitted diseases, partner selection, use of condoms, avoidance of unintended pregnancy, and contraceptive alternatives  Exercise: the importance of regular exercise/physical activity was discussed  Recommend exercise 3-5 times per week for at least 30 minutes  BMI Counseling: Body mass index is 31 93 kg/m²  The BMI is above normal  Nutrition recommendations include decreasing portion sizes, encouraging healthy choices of fruits and vegetables, moderation in carbohydrate intake and increasing intake of lean protein  Exercise recommendations include exercising 3-5 times per week  Rationale for BMI follow-up plan is due to patient being overweight or obese  Return in 6 months (on 2022)       Chief Complaint:     Chief Complaint   Patient presents with    Annual Exam     Wellness visit      History of Present Illness:     Adult Annual Physical Patient here for a comprehensive physical exam  The patient reports no problems  Diet and Physical Activity  Diet/Nutrition: well balanced diet  Exercise: moderate cardiovascular exercise  Depression Screening  PHQ-2/9 Depression Screening         General Health  Sleep: gets 4-6 hours of sleep on average  Hearing: normal - bilateral   Vision: most recent eye exam <1 year ago  Dental: regular dental visits  /GYN Health  Last menstrual period: 5/12/2022  Contraceptive method: oral contraceptives  History of STDs?: no      Review of Systems:     Review of Systems   Constitutional: Negative for activity change, appetite change, diaphoresis, fatigue and fever  HENT: Negative  Negative for dental problem  Eyes: Negative  Negative for visual disturbance  Respiratory: Negative for apnea, cough, chest tightness, shortness of breath and wheezing  Cardiovascular: Negative for chest pain, palpitations and leg swelling  Gastrointestinal: Negative for abdominal distention, abdominal pain, anal bleeding, constipation, diarrhea, nausea and vomiting  Endocrine: Negative for cold intolerance, heat intolerance, polydipsia, polyphagia and polyuria  Patient has a pituitary micro adenoma with hyperprolactinemia   Genitourinary: Negative for difficulty urinating, dysuria, flank pain, hematuria and urgency  Musculoskeletal: Negative for arthralgias, back pain, gait problem, joint swelling and myalgias  Skin: Negative for color change, rash and wound  Allergic/Immunologic: Negative for environmental allergies, food allergies and immunocompromised state  Neurological: Positive for headaches  Negative for dizziness, seizures, syncope, speech difficulty and numbness  Hematological: Negative for adenopathy  Does not bruise/bleed easily  Psychiatric/Behavioral: Negative for agitation, behavioral problems, hallucinations, sleep disturbance and suicidal ideas   The patient is nervous/anxious         Past Medical History:     Past Medical History:   Diagnosis Date    Abnormal LFTs (liver function tests)     LAST ASSESSED: [id number]    Adalimumab (Humira) long-term use     LAST ASSESSED: [id number]    Affective disorder (HCC)     LAST ASSESSED: 63CKP9806    Anxiety     Arthritis     Aspiration into airway     LAST ASSESSED: 38BUH3802    Benign neoplasm of pituitary gland ([facility])     LAST ASSESSED: 91AYF1464    Chondromalacia, patella     LAST ASSESSED: [id number]    Common migraine without aura     LAST ASSESSED: 69NPP7873    Depression with anxiety     Elevated C-reactive protein     LAST ASSESSED: 92DKU5975    Exertional dyspnea     LAST ASSESSED: 93UQI2298    GERD (gastroesophageal reflux disease)     Hemicrania continua     LAST ASSESSED: 40KOV0653    High protein C activity level     LAST ASSESSED: 27PDO9777    Hyperprolactinemia ([facility])     LAST ASSESSED: 90YYJ1919    Long-term use of hydroxychloroquine     LAST ASSESSED: 12QSU2840    Migraine     LAST ASSESSED: 79BNU3582    Polyarthritis     LAST ASSESSED: 68ZLY3978    Polycystic ovarian disease     Prediabetes     LAST ASSESSED: 32SVR1272    Rheumatoid arthritis ([facility])     LAST ASSESSED: 52HFC9552    Seronegative rheumatoid arthritis ([facility])     LAST ASSESSED: 95UJL1675    TMJ (dislocation of temporomandibular joint)     APPEARANCE LAST ASSESSED: 16RFJ3999    Trochanteric bursitis of right hip     LAST ASSESSED: 67NYM3318    Vitamin D deficiency     LAST ASSESSED: 05QVV9610      Past Surgical History:     Past Surgical History:   Procedure Laterality Date    CHOLECYSTECTOMY      DENTAL SURGERY      GASTRECTOMY      SLEEVE RESOLVED: 42BJZ3457    NASAL SEPTUM SURGERY      DEVIATION REPAIR    NASAL SEPTUM SURGERY      TONSILLECTOMY      TONSILLECTOMY      TOOTH EXTRACTION      WISDOM TOOTH EXTRACTION      WISDOM TOOTH EXTRACTION        Social History:     Social History     Socioeconomic History    Marital status: /Civil Union     Spouse name: None    Number of children: None    Years of education: HS OR GED    Highest education level: None   Occupational History    Occupation: CUSTOMER SERVICE     Comment: FULL-TIME EMPLOYMENT   Tobacco Use    Smoking status: Never Smoker    Smokeless tobacco: Never Used   Vaping Use    Vaping Use: Never used   Substance and Sexual Activity    Alcohol use: Not Currently     Comment: OCCASIONAL - 1 DRINK/MONTH AS PER ALLSCRIMARY    Drug use: No    Sexual activity: Yes     Partners: Male   Other Topics Concern    None   Social History Narrative    CAFFEINE USE    PATIENT HAS LIVING WILL     Social Determinants of Health     Financial Resource Strain: Not on file   Food Insecurity: Not on file   Transportation Needs: Not on file   Physical Activity: Not on file   Stress: Not on file   Social Connections: Not on file   Intimate Partner Violence: Not on file   Housing Stability: Not on file      Family History:     Family History   Problem Relation Age of Onset    Arthritis Mother     Psoriasis Mother     No Known Problems Father     Lung disease Maternal Grandfather         BLACK    Diabetes Paternal Grandmother     Cancer Paternal Grandfather     Coronary artery disease Family     Colon cancer Family     Diabetes Family       Current Medications:     Current Outpatient Medications   Medication Sig Dispense Refill    ASHWAGANDHA PO       Cholecalciferol (REPLESTA PO) Take 10,000 Units by mouth once a week       clonazePAM (KlonoPIN) 1 mg tablet Take 1 tablet (1 mg total) by mouth daily at bedtime 30 tablet 0    dicyclomine (BENTYL) 20 mg tablet Take 1 tablet (20 mg total) by mouth 2 (two) times a day 20 tablet 0    levonorgestrel-ethinyl estradiol (AVIANE,ALESSE,LESSINA) 0 1-20 MG-MCG per tablet Take 1 tablet by mouth daily        Melatonin 5 MG TABS Take 5 mg by mouth daily      sertraline (Zoloft) 50 mg tablet Take 1 tablet (50 mg total) by mouth daily 90 tablet 1    SUMAtriptan Succinate 6 MG/0 5ML SOAJ Inject 0 5mL at onset of headache and may repeat in 2 hours, MAX 2 inject a day MAX 3 days/week 1 mL 2    cyanocobalamin 1,000 mcg/mL Inject IM every 30 days x 6 months (Patient not taking: Reported on 5/13/2022) 1 mL 5    ondansetron (Zofran ODT) 4 mg disintegrating tablet Take 1 tablet (4 mg total) by mouth every 6 (six) hours as needed for nausea or vomiting 20 tablet 0     No current facility-administered medications for this visit  Allergies: Allergies   Allergen Reactions    Augmentin [Amoxicillin-Pot Clavulanate] GI Intolerance and Vomiting     Category: Adverse Reaction;     Nsaids      Other reaction(s): gastric sleeve    Pollen Extract      Other reaction(s): Other (Please comment)  Pt has post nasal drip,sneezing,eyes watery  Physical Exam:     /78 (BP Location: Left arm, Patient Position: Sitting, Cuff Size: Standard)   Temp (!) 96 8 °F (36 °C) (Temporal)   Ht 5' 6" (1 676 m)   Wt 89 7 kg (197 lb 12 8 oz)   BMI 31 93 kg/m²     Physical Exam  Constitutional:       Appearance: She is well-developed  HENT:      Head: Normocephalic and atraumatic  Right Ear: External ear normal       Left Ear: External ear normal       Nose: Nose normal    Eyes:      Conjunctiva/sclera: Conjunctivae normal       Pupils: Pupils are equal, round, and reactive to light  Cardiovascular:      Rate and Rhythm: Normal rate and regular rhythm  Heart sounds: Normal heart sounds  No murmur heard  No friction rub  Pulmonary:      Effort: Pulmonary effort is normal  No respiratory distress  Breath sounds: Normal breath sounds  No wheezing or rales  Chest:      Chest wall: No tenderness  Abdominal:      General: Bowel sounds are normal       Palpations: Abdomen is soft  Musculoskeletal:         General: Normal range of motion  Cervical back: Normal range of motion and neck supple     Skin:     General: Skin is warm and dry       Capillary Refill: Capillary refill takes 2 to 3 seconds  Neurological:      Mental Status: She is alert and oriented to person, place, and time  Psychiatric:         Behavior: Behavior normal          Thought Content:  Thought content normal          Judgment: Judgment normal           Elayne Jeffrey DO   [facility]

## 2022-06-06 DIAGNOSIS — F41.9 ANXIETY: ICD-10-CM

## 2022-06-07 RX ORDER — CLONAZEPAM 1 MG/1
1 TABLET ORAL
Qty: 30 TABLET | Refills: 0 | Status: SHIPPED | OUTPATIENT
Start: 2022-06-07 | End: 2022-07-06 | Stop reason: SDUPTHER

## 2022-06-30 ENCOUNTER — PATIENT MESSAGE (OUTPATIENT)
Dept: FAMILY MEDICINE CLINIC | Facility: CLINIC | Age: 37
End: 2022-06-30

## 2022-07-01 DIAGNOSIS — E55.9 VITAMIN D DEFICIENCY: ICD-10-CM

## 2022-07-01 DIAGNOSIS — E63.9 NUTRITIONAL DEFICIENCY: ICD-10-CM

## 2022-07-01 DIAGNOSIS — E56.9 VITAMIN DEFICIENCY: Primary | ICD-10-CM

## 2022-07-05 ENCOUNTER — PATIENT MESSAGE (OUTPATIENT)
Dept: FAMILY MEDICINE CLINIC | Facility: CLINIC | Age: 37
End: 2022-07-05

## 2022-07-06 DIAGNOSIS — F41.9 ANXIETY: ICD-10-CM

## 2022-07-06 RX ORDER — CLONAZEPAM 1 MG/1
1 TABLET ORAL
Qty: 30 TABLET | Refills: 0 | Status: SHIPPED | OUTPATIENT
Start: 2022-07-06 | End: 2022-08-04 | Stop reason: SDUPTHER

## 2022-08-04 DIAGNOSIS — F41.9 ANXIETY: ICD-10-CM

## 2022-08-04 RX ORDER — CLONAZEPAM 1 MG/1
1 TABLET ORAL
Qty: 30 TABLET | Refills: 0 | Status: SHIPPED | OUTPATIENT
Start: 2022-08-04 | End: 2022-09-13 | Stop reason: SDUPTHER

## 2022-08-16 ENCOUNTER — LAB (OUTPATIENT)
Dept: LAB | Facility: HOSPITAL | Age: 37
End: 2022-08-16
Attending: FAMILY MEDICINE
Payer: COMMERCIAL

## 2022-08-16 DIAGNOSIS — E56.9 VITAMIN DEFICIENCY: ICD-10-CM

## 2022-08-16 DIAGNOSIS — E22.9 PITUITARY MICROADENOMA WITH HYPERPROLACTINEMIA (HCC): ICD-10-CM

## 2022-08-16 DIAGNOSIS — E63.9 NUTRITIONAL DEFICIENCY: ICD-10-CM

## 2022-08-16 DIAGNOSIS — D51.9 ANEMIA DUE TO VITAMIN B12 DEFICIENCY, UNSPECIFIED B12 DEFICIENCY TYPE: ICD-10-CM

## 2022-08-16 DIAGNOSIS — E55.9 VITAMIN D DEFICIENCY: ICD-10-CM

## 2022-08-16 DIAGNOSIS — D35.2 PITUITARY MICROADENOMA WITH HYPERPROLACTINEMIA (HCC): ICD-10-CM

## 2022-08-16 LAB
25(OH)D3 SERPL-MCNC: 24.7 NG/ML (ref 30–100)
FERRITIN SERPL-MCNC: 10 NG/ML (ref 8–388)
IRON SATN MFR SERPL: 15 % (ref 15–50)
IRON SERPL-MCNC: 55 UG/DL (ref 50–170)
PROLACTIN SERPL-MCNC: 9.4 NG/ML
TIBC SERPL-MCNC: 365 UG/DL (ref 250–450)
VIT B12 SERPL-MCNC: 172 PG/ML (ref 100–900)

## 2022-08-16 PROCEDURE — 83550 IRON BINDING TEST: CPT

## 2022-08-16 PROCEDURE — 82306 VITAMIN D 25 HYDROXY: CPT

## 2022-08-16 PROCEDURE — 84146 ASSAY OF PROLACTIN: CPT

## 2022-08-16 PROCEDURE — 36415 COLL VENOUS BLD VENIPUNCTURE: CPT

## 2022-08-16 PROCEDURE — 82380 ASSAY OF CAROTENE: CPT

## 2022-08-16 PROCEDURE — 82728 ASSAY OF FERRITIN: CPT

## 2022-08-16 PROCEDURE — 82607 VITAMIN B-12: CPT

## 2022-08-16 PROCEDURE — 84590 ASSAY OF VITAMIN A: CPT

## 2022-08-16 PROCEDURE — 83540 ASSAY OF IRON: CPT

## 2022-08-17 ENCOUNTER — OFFICE VISIT (OUTPATIENT)
Dept: FAMILY MEDICINE CLINIC | Facility: CLINIC | Age: 37
End: 2022-08-17
Payer: COMMERCIAL

## 2022-08-17 VITALS
HEIGHT: 66 IN | DIASTOLIC BLOOD PRESSURE: 86 MMHG | SYSTOLIC BLOOD PRESSURE: 112 MMHG | HEART RATE: 67 BPM | TEMPERATURE: 97.1 F | BODY MASS INDEX: 31.66 KG/M2 | WEIGHT: 197 LBS | OXYGEN SATURATION: 98 %

## 2022-08-17 DIAGNOSIS — Z00.00 ANNUAL PHYSICAL EXAM: ICD-10-CM

## 2022-08-17 DIAGNOSIS — D50.9 IRON DEFICIENCY ANEMIA, UNSPECIFIED IRON DEFICIENCY ANEMIA TYPE: Primary | ICD-10-CM

## 2022-08-17 PROCEDURE — 3725F SCREEN DEPRESSION PERFORMED: CPT | Performed by: FAMILY MEDICINE

## 2022-08-17 PROCEDURE — 99395 PREV VISIT EST AGE 18-39: CPT | Performed by: FAMILY MEDICINE

## 2022-08-17 RX ORDER — LEVONORGESTREL AND ETHINYL ESTRADIOL 0.15-0.03
KIT ORAL
COMMUNITY
Start: 2022-07-30

## 2022-08-17 NOTE — PROGRESS NOTES
[facility]    NAME: Bony Alexander  AGE: 39 y o  SEX: female  : 1985     DATE: 2022     Assessment and Plan:     Problem List Items Addressed This Visit    None         Immunizations and preventive care screenings were discussed with patient today  Appropriate education was printed on patient's after visit summary  Counseling:  Alcohol/drug use: discussed moderation in alcohol intake, the recommendations for healthy alcohol use, and avoidance of illicit drug use  Dental Health: discussed importance of regular tooth brushing, flossing, and dental visits  Injury prevention: discussed safety/seat belts, safety helmets, smoke detectors, carbon dioxide detectors, and smoking near bedding or upholstery  Sexual health: discussed sexually transmitted diseases, partner selection, use of condoms, avoidance of unintended pregnancy, and contraceptive alternatives  · Exercise: the importance of regular exercise/physical activity was discussed  Recommend exercise 3-5 times per week for at least 30 minutes  No follow-ups on file  Chief Complaint:     Chief Complaint   Patient presents with    Annual Exam      History of Present Illness:     Adult Annual Physical   Patient here for a comprehensive physical exam  The patient reports no problems  Diet and Physical Activity  · Diet/Nutrition: well balanced diet  · Exercise: walking        Depression Screening  PHQ-2/9 Depression Screening    Little interest or pleasure in doing things: 0 - not at all  Feeling down, depressed, or hopeless: 0 - not at all  Trouble falling or staying asleep, or sleeping too much: 1 - several days  Feeling tired or having little energy: 0 - not at all  Poor appetite or overeatin - not at all  Feeling bad about yourself - or that you are a failure or have let yourself or your family down: 0 - not at all  Trouble concentrating on things, such as reading the newspaper or watching television: 0 - not at all  Moving or speaking so slowly that other people could have noticed  Or the opposite - being so fidgety or restless that you have been moving around a lot more than usual: 0 - not at all  Thoughts that you would be better off dead, or of hurting yourself in some way: 0 - not at all  PHQ-9 Score: 1   PHQ-9 Interpretation: No or Minimal depression        General Health  · Sleep: gets 7-8 hours of sleep on average  · Hearing: normal - bilateral   · Vision: most recent eye exam <1 year ago  · Dental: regular dental visits  /GYN Health  · Last menstrual period:  August  · Contraceptive method: oral contraceptives    · History of STDs?: no      Review of Systems:     Review of Systems   Past Medical History:     Past Medical History:   Diagnosis Date    Abnormal LFTs (liver function tests)     LAST ASSESSED: [id number]    Adalimumab (Humira) long-term use     LAST ASSESSED: 04FBG4904    Affective disorder ([facility])     LAST ASSESSED: 14WMH1182    Anxiety     Arthritis     Aspiration into airway     LAST ASSESSED: 41OKG5195    Benign neoplasm of pituitary gland ([facility])     LAST ASSESSED: 59XEN1813    Chondromalacia, patella     LAST ASSESSED: 95TLA9370    Common migraine without aura     LAST ASSESSED: 21TQS5807    Depression with anxiety     Elevated C-reactive protein     LAST ASSESSED: 16CDB2154    Exertional dyspnea     LAST ASSESSED: 54DVQ2475    GERD (gastroesophageal reflux disease)     Hemicrania continua     LAST ASSESSED: 94HHD3114    High protein C activity level     LAST ASSESSED: 50TKX5724    Hyperprolactinemia ([facility])     LAST ASSESSED: 71SAX5361    Long-term use of hydroxychloroquine     LAST ASSESSED: 36XYE4442    Migraine     LAST ASSESSED: 33ZRZ8645    Polyarthritis     LAST ASSESSED: 11MPY3455    Polycystic ovarian disease     Prediabetes     LAST ASSESSED: 66WDE9305    Rheumatoid arthritis ([facility])     LAST ASSESSED: 14QBI9558    Seronegative rheumatoid arthritis (HCC)     LAST ASSESSED: 01LEU3471    TMJ (dislocation of temporomandibular joint)     APPEARANCE LAST ASSESSED: 80GOK3322    Trochanteric bursitis of right hip     LAST ASSESSED: 64OPC1676    Vitamin D deficiency     LAST ASSESSED: 63GUC5719      Past Surgical History:     Past Surgical History:   Procedure Laterality Date    CHOLECYSTECTOMY      DENTAL SURGERY      GASTRECTOMY      SLEEVE RESOLVED: 58UJG3749    NASAL SEPTUM SURGERY      DEVIATION REPAIR    NASAL SEPTUM SURGERY      TONSILLECTOMY      TONSILLECTOMY      TOOTH EXTRACTION      WISDOM TOOTH EXTRACTION      WISDOM TOOTH EXTRACTION        Social History:     Social History     Socioeconomic History    Marital status: /Civil Union     Spouse name: None    Number of children: None    Years of education: HS OR GED    Highest education level: None   Occupational History    Occupation: CUSTOMER SERVICE     Comment: FULL-TIME EMPLOYMENT   Tobacco Use    Smoking status: Never Smoker    Smokeless tobacco: Never Used   Vaping Use    Vaping Use: Never used   Substance and Sexual Activity    Alcohol use: Not Currently     Comment: OCCASIONAL - 1 DRINK/MONTH AS PER ALLSCRIPTS    Drug use: No    Sexual activity: Yes     Partners: Male   Other Topics Concern    None   Social History Narrative    CAFFEINE USE    PATIENT HAS LIVING WILL     Social Determinants of Health     Financial Resource Strain: Not on file   Food Insecurity: Not on file   Transportation Needs: Not on file   Physical Activity: Not on file   Stress: Not on file   Social Connections: Not on file   Intimate Partner Violence: Not on file   Housing Stability: Not on file      Family History:     Family History   Problem Relation Age of Onset    Arthritis Mother     Psoriasis Mother     No Known Problems Father     Lung disease Maternal Grandfather         BLACK    Diabetes Paternal Grandmother     Cancer Paternal Grandfather     Coronary artery disease Family     Colon cancer Family     Diabetes Family       Current Medications:     Current Outpatient Medications   Medication Sig Dispense Refill    ASHWAGANDHA PO       Cholecalciferol (REPLESTA PO) Take 10,000 Units by mouth once a week       clonazePAM (KlonoPIN) 1 mg tablet Take 1 tablet (1 mg total) by mouth daily at bedtime 30 tablet 0    levonorgestrel-ethinyl estradiol (AVIANE,ALESSE,LESSINA) 0 1-20 MG-MCG per tablet Take 1 tablet by mouth daily   levonorgestrel-ethinyl estradiol (NORDETTE) 0 15-30 MG-MCG per tablet       Melatonin 5 MG TABS Take 5 mg by mouth daily      SUMAtriptan Succinate 6 MG/0 5ML SOAJ Inject 0 5mL at onset of headache and may repeat in 2 hours, MAX 2 inject a day MAX 3 days/week 1 mL 2    cyanocobalamin 1,000 mcg/mL Inject IM every 30 days x 6 months (Patient not taking: Reported on 8/17/2022) 1 mL 5     No current facility-administered medications for this visit  Allergies: Allergies   Allergen Reactions    Augmentin [Amoxicillin-Pot Clavulanate] GI Intolerance and Vomiting     Category: Adverse Reaction;     Nsaids      Other reaction(s): gastric sleeve    Pollen Extract      Other reaction(s): Other (Please comment)  Pt has post nasal drip,sneezing,eyes watery  Physical Exam:     /86 (BP Location: Left arm, Patient Position: Sitting, Cuff Size: Standard)   Pulse 67   Temp (!) 97 1 °F (36 2 °C) (Temporal)   Ht 5' 6" (1 676 m)   Wt 89 4 kg (197 lb)   SpO2 98%   BMI 31 80 kg/m²     Physical Exam  Constitutional:       Appearance: She is well-developed  HENT:      Head: Normocephalic and atraumatic  Right Ear: External ear normal       Left Ear: External ear normal       Nose: Nose normal    Eyes:      Conjunctiva/sclera: Conjunctivae normal       Pupils: Pupils are equal, round, and reactive to light  Cardiovascular:      Rate and Rhythm: Normal rate and regular rhythm        Heart sounds: Normal heart sounds  No murmur heard  No friction rub  Pulmonary:      Effort: Pulmonary effort is normal  No respiratory distress  Breath sounds: Normal breath sounds  No wheezing or rales  Chest:      Chest wall: No tenderness  Abdominal:      General: Bowel sounds are normal       Palpations: Abdomen is soft  Musculoskeletal:         General: Normal range of motion  Cervical back: Normal range of motion and neck supple  Skin:     General: Skin is warm and dry  Capillary Refill: Capillary refill takes 2 to 3 seconds  Neurological:      Mental Status: She is alert and oriented to person, place, and time  Psychiatric:         Behavior: Behavior normal          Thought Content:  Thought content normal          Judgment: Judgment normal           Alexandru Ayala DO   [facility]

## 2022-08-17 NOTE — PATIENT INSTRUCTIONS

## 2022-08-18 ENCOUNTER — TELEPHONE (OUTPATIENT)
Dept: ADMINISTRATIVE | Facility: OTHER | Age: 37
End: 2022-08-18

## 2022-08-18 NOTE — TELEPHONE ENCOUNTER
----- Message from Dominic Connelly sent at 8/17/2022 12:07 PM EDT -----  08/17/22 12:07 PM    Hello, our patient Tracy Oconnor has had Pap Smear (HPV) aka Cervical Cancer Screening completed/performed  Please assist in updating the patient chart by making an External outreach to Dr Brianna Braun facility located in [address] [place] [address]  The date of service is 6/28/2022      Thank you,  Jovana Odom   [facility]

## 2022-08-18 NOTE — TELEPHONE ENCOUNTER
Upon review of the In Basket request we were able to locate, review, and update the patient chart as requested for Pap Smear (HPV) aka Cervical Cancer Screening  Any additional questions or concerns should be emailed to the Practice Liaisons via [email or web address] email, please do not reply via In Basket      Thank you  Ladan Freitas

## 2022-08-18 NOTE — LETTER
Procedure Request Form: Cervical Cancer Screening      Date Requested: 22  Patient: Leighann Turcios  Patient : 1985   Referring Provider: Verlucy [place], DO        Date of Procedure ______________________________       The above patient has informed us that they have completed their   most recent Cervical Cancer Screening at your facility  Please complete   this form and attach all corresponding procedure reports/results  Comments __________________________________________________________  ____________________________________________________________________  ____________________________________________________________________  ____________________________________________________________________    Facility Completing Procedure _________________________________________    Form Completed By (print name) _______________________________________      Signature __________________________________________________________      These reports are needed for  compliance  Please fax this completed form and a copy of the procedure report to our office located at [place] [address] as soon as possible to [phone] attention Enriqueta Seip: Phone [phone]    We thank you for your assistance in treating our mutual patient

## 2022-08-18 NOTE — TELEPHONE ENCOUNTER
Upon review of the In Basket request and the patient's chart, initial outreach has been made via fax, please see Contacts section for details       Thank you  Francisca Porter

## 2022-08-19 LAB — CAROTENE SERPL-MCNC: 6 UG/DL (ref 3–91)

## 2022-08-20 LAB — VIT A SERPL-MCNC: 28 UG/DL (ref 18.9–57.3)

## 2022-08-22 ENCOUNTER — OFFICE VISIT (OUTPATIENT)
Dept: PHYSICAL THERAPY | Facility: HOME HEALTHCARE | Age: 37
End: 2022-08-22
Payer: COMMERCIAL

## 2022-08-22 DIAGNOSIS — N94.10 DYSPAREUNIA IN FEMALE: Primary | ICD-10-CM

## 2022-08-22 PROCEDURE — 97140 MANUAL THERAPY 1/> REGIONS: CPT | Performed by: PHYSICAL THERAPIST

## 2022-08-22 PROCEDURE — 97530 THERAPEUTIC ACTIVITIES: CPT | Performed by: PHYSICAL THERAPIST

## 2022-08-22 PROCEDURE — 97162 PT EVAL MOD COMPLEX 30 MIN: CPT | Performed by: PHYSICAL THERAPIST

## 2022-08-22 NOTE — PROGRESS NOTES
PT Evaluation     Today's date: 2022  Patient name: Hilda Norman  : 1985  MRN: [id number]  Referring provider: Elder Leigh PT  Dx:   Encounter Diagnosis     ICD-10-CM    1  Dyspareunia in female  N94 10        Start Time:   Stop Time: 80  Total time in clinic (min): 60 minutes    Assessment  Assessment details: Pt Naveen Sy is a 39 y o  who presents to OPPT with s/s consistent with dyspareunia  Pt with pelvic floor tightness and weakness upon exam  Pt with sensory feedback with deeper tactile cueing  Kegel completed with poor strength and breath holding pattern observed; poor control of intra abdominal pressure  PT will address the noted impairments by performing pelvic floor and hip strengthening, stretching, breathing exercises, biofeedback, functional activities and manual techniques to allow the patient to return to her PLOF  Impairments: abnormal coordination, abnormal muscle tone, abnormal or restricted ROM, activity intolerance, impaired physical strength and pain with function  Other impairment: pelvic floor weakness  Understanding of Dx/Px/POC: good   Prognosis: good    Goals  STGs to be met in 4 weeks:  * Pt will demonstrate correct isolation, contraction, and relaxation of pelvic floor without overflow from global stabilizers  * Pt will demonstrate correct diaphragmatic breathing in order to decrease muscle over-activity and improve intra-abdominal pressure gradients  * Patient will report a decreased in pain by 2-3 points for improved function with less restrictions due to pain  *Pt to be independent with initial HEP, demonstrating proper technique and understanding to improve functional tolerance to activites  LTGs to be met by discharge:  * Normalize findings on sEMG to indicate PFM strength average of at least 12uV and resting average < 2 5uV  * Implements relaxation strategies on a daily basis    * Patient will report 90% reduction in discomfort with either palpation or intimacy  * Patient will be compliant with comprehensive home exercise program for self management of condition    Plan  Patient would benefit from: skilled physical therapy  Planned modality interventions: biofeedback  Planned therapy interventions: abdominal trunk stabilization, manual therapy, neuromuscular re-education, patient education, postural training, breathing training, home exercise program, functional ROM exercises, flexibility, therapeutic activities and therapeutic exercise  Frequency: 1x week  Duration in weeks: 8  Plan of Care beginning date: 8/22/2022  Plan of Care expiration date: 9/23/2022  Treatment plan discussed with: patient        PT Pelvic Floor Subjective:   History of Present Illness:   Pt reporting that since the delivery of 2nd child, she has been having lack of sensation during intercourse and lack of sex drive  She notes that her children are 19 months apart and her 2nd was 9lb 15oz  She did have 2nd degree tears with both vaginal deliveries but unremarkable healing otherwise  She notes that the epidural did take a long time to wear off after delivery and experienced urinary incontinence post partum which has improved now to intermittent AUGUSTINA with coughing/sneezing  Pt does feel discomfort during deeper penetration and has sensation upon penetration  She notes that otherwise she doesn't feel anything during sex and it feels like she has a "gapping hole" there    She did feel discomfort during last OBGYN internal exam     Social Support:     Lives with:  Spouse and young children    Relationship status: /committed    Work status: employed full time  OB/ gyn History    Gestational History:     Prior Pregnancy: Yes      Number of prior pregnancies: 2    Number of term pregnancies: 2    Delivery Type: vaginal delivery      Menstrual History:      Menstrual irregularities regular menses    Painful periods:  No difficulty managing menstrual pain    Tolerates tampons: no by choice    Birth control method: birth control pills  Bladder Function:     Voiding Difficulties comments:     Urinary leakage: urine leakage    Urinary leakage aggravated by: coughing, sneezing and exercise    Painful urination: No    Sexual Function:     Sexually Active:  Sexually active    Pain during intercourse: Yes      pain does not cause abstinence  Pain:     At best pain ratin    At worst pain ratin  Diagnostic Tests:     Ultrasound: normal  Treatments:     Current treatment: physical therapy    Patient Goals:     Patient goals for therapy:  Improved pain management, improved quality of life, improved comfort and fully empty bladder or bowels      Objective   Pelvic Floor Exam     General Perineum Exam:   perineum intact  Visual Inspection of Perineum:   Cotton swab test: non-tender  Sensation: intact    Pelvic Organ Prolapse   no pelvic organ prolapse    Pelvic Floor Muscle Exam     Palpation   Moderate increased muscle tension in the pubococcygeus, iliococcygeus, coccygeus and obturator internus  Minimal tenderness on right in the pubococcygeus, iliococcygeus, coccygeus and obturator internus  Moderate tenderness on left in the pubococcygeus, iliococcygeus, coccygeus and obturator internus    Muscle Contraction: well isolated  Breathing pattern with contraction: holding breath  Pelvic floor muscle relaxation is complete       PERFECT Score   Power right: 2+/5  Power left: 2+/5  Endurance (seconds to max): 3                  Precautions: pelvic pain; AUGUSTINA    DA ends  22     Manuals        Pelvic exam  15 min   *pt provided verbal and written consent*                                Neuro Re-Ed                                                                 Ther Ex                                                                        Ther Activity        Pelvic floor anatomy and function  10 min                Gait Training                        Modalities

## 2022-08-30 ENCOUNTER — OFFICE VISIT (OUTPATIENT)
Dept: PHYSICAL THERAPY | Facility: HOME HEALTHCARE | Age: 37
End: 2022-08-30
Payer: COMMERCIAL

## 2022-08-30 DIAGNOSIS — N94.10 DYSPAREUNIA IN FEMALE: Primary | ICD-10-CM

## 2022-08-30 PROCEDURE — 97140 MANUAL THERAPY 1/> REGIONS: CPT | Performed by: PHYSICAL THERAPIST

## 2022-08-30 PROCEDURE — 97112 NEUROMUSCULAR REEDUCATION: CPT | Performed by: PHYSICAL THERAPIST

## 2022-08-30 NOTE — PROGRESS NOTES
Daily Note     Today's date: 2022  Patient name: Abi Duke  : 1985  MRN: [id number]  Referring provider: Jose C Leyva PT  Dx:   Encounter Diagnosis     ICD-10-CM    1  Dyspareunia in female  N94 10                   Subjective:  Pt with no new complaints  Objective: See treatment diary below      Assessment: PT completed internal soft tissue release at coccygeus on L side; pt pain increased to 6/10 with deep pressure but no lasting pain to end exam  PT encouraged pt to try self release techniques or have partner help at home due to significant tightness L side  Weakness of PFM also evident with fatigue during exam to trace contraction  Plan: Continue per plan of care           Precautions: pelvic pain; AUGUSTINA    DA ends  22     Manuals       Pelvic exam  15 min   *pt provided verbal and written consent*  15 min   Coccygeus release     sensory 'clock' awareness                               Neuro Re-Ed         DB   Reviewed sequencing with pelvic contraction       PFM  HEP   1 min work   1 min rest     2-3x per day supine                                               Ther Ex                                                                        Ther Activity        Pelvic floor anatomy and function  10 min                Gait Training                        Modalities

## 2022-09-06 ENCOUNTER — OFFICE VISIT (OUTPATIENT)
Dept: PHYSICAL THERAPY | Facility: HOME HEALTHCARE | Age: 37
End: 2022-09-06
Payer: COMMERCIAL

## 2022-09-06 DIAGNOSIS — N94.10 DYSPAREUNIA IN FEMALE: Primary | ICD-10-CM

## 2022-09-06 PROCEDURE — 97112 NEUROMUSCULAR REEDUCATION: CPT | Performed by: PHYSICAL THERAPIST

## 2022-09-06 PROCEDURE — 97110 THERAPEUTIC EXERCISES: CPT | Performed by: PHYSICAL THERAPIST

## 2022-09-06 NOTE — PROGRESS NOTES
Daily Note     Today's date: 2022  Patient name: Jamie Ortiz  : 1985  MRN: [id number]  Referring provider: Marlon Arndt, PT  Dx:   Encounter Diagnosis     ICD-10-CM    1  Dyspareunia in female  N94 10                   Subjective: Pt notes that she only got to do the exercises 3 days last week but can tell she fatigues with the pelvic floor around rep 6-8  Objective: See treatment diary below      Assessment: Pt with good self awareness on pelvic floor contraction and fatigue with minimal reps  VCs provided to avoid breath holding and improved sequencing of work on exhale  Encouraged consistency with HEP for strengthening program and carry over at home  Plan: Continue per plan of care           Precautions: pelvic pain; AUGUSTINA    DA ends  22     Manuals      Pelvic exam  15 min   *pt provided verbal and written consent*  15 min   Coccygeus release     sensory 'clock' awareness                               Neuro Re-Ed         DB   Reviewed sequencing with pelvic contraction       PFM  HEP   1 min work   1 min rest     2-3x per day supine  Reviewed consistency for HEP     PFM + DB + hip add   2-3"   X 10 supine      TA    5" x 10   Supine      Toe taps    Alt x 10      Pelvic brace    Seated x 10                     Ther Ex        NuStep    L2 10 minutes     SLR   2 x 10     S/L SLR        Clamshells        Bridges                                 Ther Activity        Pelvic floor anatomy and function  10 min                Gait Training                        Modalities

## 2022-09-13 ENCOUNTER — APPOINTMENT (OUTPATIENT)
Dept: PHYSICAL THERAPY | Facility: HOME HEALTHCARE | Age: 37
End: 2022-09-13
Payer: COMMERCIAL

## 2022-09-13 DIAGNOSIS — F41.9 ANXIETY: ICD-10-CM

## 2022-09-13 RX ORDER — CLONAZEPAM 1 MG/1
1 TABLET ORAL
Qty: 30 TABLET | Refills: 0 | Status: SHIPPED | OUTPATIENT
Start: 2022-09-13 | End: 2022-10-14 | Stop reason: SDUPTHER

## 2022-09-20 ENCOUNTER — APPOINTMENT (OUTPATIENT)
Dept: PHYSICAL THERAPY | Facility: HOME HEALTHCARE | Age: 37
End: 2022-09-20
Payer: COMMERCIAL

## 2022-09-23 ENCOUNTER — OFFICE VISIT (OUTPATIENT)
Dept: URGENT CARE | Facility: MEDICAL CENTER | Age: 37
End: 2022-09-23
Payer: COMMERCIAL

## 2022-09-23 VITALS
RESPIRATION RATE: 20 BRPM | HEART RATE: 102 BPM | WEIGHT: 195 LBS | OXYGEN SATURATION: 98 % | HEIGHT: 65 IN | TEMPERATURE: 97.2 F | BODY MASS INDEX: 32.49 KG/M2

## 2022-09-23 DIAGNOSIS — R05.9 COUGH: Primary | ICD-10-CM

## 2022-09-23 PROCEDURE — S9088 SERVICES PROVIDED IN URGENT: HCPCS | Performed by: NURSE PRACTITIONER

## 2022-09-23 PROCEDURE — 99213 OFFICE O/P EST LOW 20 MIN: CPT | Performed by: NURSE PRACTITIONER

## 2022-09-23 RX ORDER — BENZONATATE 100 MG/1
100 CAPSULE ORAL 3 TIMES DAILY PRN
Qty: 20 CAPSULE | Refills: 0 | Status: SHIPPED | OUTPATIENT
Start: 2022-09-23

## 2022-09-23 NOTE — PROGRESS NOTES
[facility]        NAME: Eva Craig is a 44 y o  female  : 1985    MRN: [id number]  DATE: 2022  TIME: 1:05 PM    Assessment and Plan   Cough [R05 9]  1  Cough  benzonatate (TESSALON PERLES) 100 mg capsule         Patient Instructions     Patient Instructions   Take medication as directed  Rest and drink plenty of fluids  A cool mist humidifier can be helpful  If you develop a worsening cough, chest pain, shortness of breath, palpitations, coughing up blood, prolonged high fever, any new or concerning symptoms please return or proceed ER  Recommend following up with PCP in 3-5 days        Follow up with PCP in 3-5 days  Proceed to  ER if symptoms worsen  Chief Complaint     Chief Complaint   Patient presents with    Cough     Dry cough for 2 days, took home covid test today which is negative, both children are sick at home with cold symptoms and also tested negative for covid          History of Present Illness       Cough  This is a new problem  The current episode started yesterday  The problem has been unchanged  The problem occurs every few minutes  The cough is non-productive  Pertinent negatives include no chest pain, chills, ear pain, fever, headaches, hemoptysis, myalgias, nasal congestion, postnasal drip, rash, rhinorrhea, sore throat, shortness of breath or wheezing  Nothing aggravates the symptoms  She has tried nothing for the symptoms  There is no history of asthma or COPD  Had a negative home covid test  Review of Systems   Review of Systems   Constitutional: Negative for chills, diaphoresis, fatigue and fever  HENT: Negative for congestion, ear pain, facial swelling, postnasal drip, rhinorrhea, sinus pressure, sinus pain, sore throat, tinnitus and trouble swallowing  Eyes: Negative  Respiratory: Positive for cough  Negative for hemoptysis, chest tightness, shortness of breath, wheezing and stridor      Cardiovascular: Negative for chest pain and palpitations  Gastrointestinal: Negative  Musculoskeletal: Negative for arthralgias, back pain, joint swelling, myalgias, neck pain and neck stiffness  Skin: Negative for rash  Neurological: Negative for dizziness, facial asymmetry, weakness, light-headedness, numbness and headaches  Current Medications       Current Outpatient Medications:     benzonatate (TESSALON PERLES) 100 mg capsule, Take 1 capsule (100 mg total) by mouth 3 (three) times a day as needed for cough, Disp: 20 capsule, Rfl: 0    clonazePAM (KlonoPIN) 1 mg tablet, Take 1 tablet (1 mg total) by mouth daily at bedtime, Disp: 30 tablet, Rfl: 0    levonorgestrel-ethinyl estradiol (NORDETTE) 0 15-30 MG-MCG per tablet, , Disp: , Rfl:     Melatonin 5 MG TABS, Take 5 mg by mouth daily, Disp: , Rfl:     SUMAtriptan Succinate 6 MG/0 5ML SOAJ, Inject 0 5mL at onset of headache and may repeat in 2 hours, MAX 2 inject a day MAX 3 days/week, Disp: 1 mL, Rfl: 2    ASHWAGANDHA PO, , Disp: , Rfl:     Cholecalciferol (REPLESTA PO), Take 10,000 Units by mouth once a week  (Patient not taking: Reported on 9/23/2022), Disp: , Rfl:     cyanocobalamin 1,000 mcg/mL, Inject IM every 30 days x 6 months (Patient not taking: No sig reported), Disp: 1 mL, Rfl: 5    levonorgestrel-ethinyl estradiol (AVIANE,ALESSE,LESSINA) 0 1-20 MG-MCG per tablet, Take 1 tablet by mouth daily   (Patient not taking: Reported on 9/23/2022), Disp: , Rfl:     Current Allergies     Allergies as of 09/23/2022 - Reviewed 09/23/2022   Allergen Reaction Noted    Augmentin [amoxicillin-pot clavulanate] GI Intolerance and Vomiting 05/28/2015    Nsaids  04/17/2017    Pollen extract  05/12/2004            The following portions of the patient's history were reviewed and updated as appropriate: allergies, current medications, past family history, past medical history, past social history, past surgical history and problem list      Past Medical History:   Diagnosis Date    Abnormal LFTs (liver function tests)     LAST ASSESSED: [id number]    Adalimumab (Humira) long-term use     LAST ASSESSED: [id number]    Affective disorder (HCC)     LAST ASSESSED: [id number]    Anxiety     Arthritis     Aspiration into airway     LAST ASSESSED: [id number]    Benign neoplasm of pituitary gland ([facility])     LAST ASSESSED: [id number]    Chondromalacia, patella     LAST ASSESSED: [id number]    Common migraine without aura     LAST ASSESSED: [id number]    Depression with anxiety     Elevated C-reactive protein     LAST ASSESSED: [id number]    Exertional dyspnea     LAST ASSESSED: [id number]    GERD (gastroesophageal reflux disease)     Hemicrania continua     LAST ASSESSED: [id number]    High protein C activity level     LAST ASSESSED: [id number]    Hyperprolactinemia ([facility])     LAST ASSESSED: [id number]    Long-term use of hydroxychloroquine     LAST ASSESSED: [id number]    Migraine     LAST ASSESSED: 92JJH4812    Polyarthritis     LAST ASSESSED: 10CGH9302    Polycystic ovarian disease     Prediabetes     LAST ASSESSED: [id number]    Rheumatoid arthritis ([facility])     LAST ASSESSED: [id number]    Seronegative rheumatoid arthritis ([facility])     LAST ASSESSED: [id number]    TMJ (dislocation of temporomandibular joint)     APPEARANCE LAST ASSESSED: [id number]    Trochanteric bursitis of right hip     LAST ASSESSED: [id number]    Vitamin D deficiency     LAST ASSESSED: [id number]       Past Surgical History:   Procedure Laterality Date    CHOLECYSTECTOMY      DENTAL SURGERY      GASTRECTOMY      SLEEVE RESOLVED: [id number]    NASAL SEPTUM SURGERY      DEVIATION REPAIR    NASAL SEPTUM SURGERY      TONSILLECTOMY      TONSILLECTOMY      TOOTH EXTRACTION      WISDOM TOOTH EXTRACTION      WISDOM TOOTH EXTRACTION         Family History   Problem Relation Age of Onset    Arthritis Mother     Psoriasis Mother     No Known Problems Father     Lung disease Maternal Grandfather         BLACK    Diabetes Paternal Grandmother     Cancer Paternal Grandfather     Coronary artery disease Family     Colon cancer Family     Diabetes Family          Medications have been verified  Objective   Pulse 102   Temp (!) 97 2 °F (36 2 °C)   Resp 20   Ht 5' 5" (1 651 m)   Wt 88 5 kg (195 lb)   LMP 09/16/2022   SpO2 98%   BMI 32 45 kg/m²   Patient's last menstrual period was 09/16/2022  Physical Exam     Physical Exam  Constitutional:       General: She is not in acute distress  Appearance: She is well-developed  She is not diaphoretic  HENT:      Head: Normocephalic and atraumatic  Right Ear: Hearing, tympanic membrane, ear canal and external ear normal       Left Ear: Hearing, tympanic membrane, ear canal and external ear normal       Nose: Congestion and rhinorrhea present  No mucosal edema  Right Sinus: No maxillary sinus tenderness or frontal sinus tenderness  Left Sinus: No maxillary sinus tenderness or frontal sinus tenderness  Mouth/Throat:      Pharynx: Oropharynx is clear  Uvula midline  Cardiovascular:      Rate and Rhythm: Normal rate and regular rhythm  Heart sounds: Normal heart sounds, S1 normal and S2 normal    Pulmonary:      Effort: Pulmonary effort is normal       Breath sounds: Normal breath sounds and air entry  Lymphadenopathy:      Cervical: No cervical adenopathy  Skin:     General: Skin is warm and dry  Capillary Refill: Capillary refill takes less than 2 seconds  Neurological:      Mental Status: She is alert and oriented to person, place, and time

## 2022-09-27 ENCOUNTER — APPOINTMENT (OUTPATIENT)
Dept: PHYSICAL THERAPY | Facility: HOME HEALTHCARE | Age: 37
End: 2022-09-27
Payer: COMMERCIAL

## 2022-10-14 DIAGNOSIS — F41.9 ANXIETY: ICD-10-CM

## 2022-10-14 DIAGNOSIS — G43.109 MIGRAINE WITH AURA AND WITHOUT STATUS MIGRAINOSUS, NOT INTRACTABLE: ICD-10-CM

## 2022-10-14 RX ORDER — CEFUROXIME AXETIL 250 MG/1
TABLET ORAL
Qty: 1 ML | Refills: 0 | Status: SHIPPED | OUTPATIENT
Start: 2022-10-14

## 2022-10-14 RX ORDER — CLONAZEPAM 1 MG/1
1 TABLET ORAL
Qty: 30 TABLET | Refills: 0 | Status: SHIPPED | OUTPATIENT
Start: 2022-10-14

## 2022-10-30 ENCOUNTER — OFFICE VISIT (OUTPATIENT)
Dept: URGENT CARE | Facility: MEDICAL CENTER | Age: 37
End: 2022-10-30

## 2022-10-30 VITALS
WEIGHT: 191.14 LBS | HEART RATE: 95 BPM | DIASTOLIC BLOOD PRESSURE: 62 MMHG | OXYGEN SATURATION: 99 % | RESPIRATION RATE: 18 BRPM | BODY MASS INDEX: 30.72 KG/M2 | HEIGHT: 66 IN | TEMPERATURE: 97.2 F | SYSTOLIC BLOOD PRESSURE: 100 MMHG

## 2022-10-30 DIAGNOSIS — R21 RASH: Primary | ICD-10-CM

## 2022-10-30 RX ORDER — FAMCICLOVIR 500 MG/1
1500 TABLET, FILM COATED ORAL ONCE
Qty: 3 TABLET | Refills: 0 | Status: SHIPPED | OUTPATIENT
Start: 2022-10-30 | End: 2022-10-30

## 2022-10-30 NOTE — PROGRESS NOTES
[address] [facility]        NAME: Rosemary Arenas is a 44 y o  female  : 1985    MRN: [id number]  DATE: 2022  TIME: 10:33 AM    Assessment and Plan   Rash [R21]  1  Rash  al mag oxide-diphenhydramine-lidocaine viscous (MAGIC MOUTHWASH) 1:1:1 suspension    famciclovir (FAMVIR) 500 mg tablet     Covering for HSV with famciclovir  Given symptoms and presentation with exposure, rash is most likely due to HFMD  It is also possible that patient could have a secondary cold sore  Patient Instructions     Magic mouthwash as needed  Tylenol for discomfort or fever  Clean surfaces that came in contact with saliva, feces, or other bodily fluids with diluted chlorine containing bleach  Frequently wash hands  Plenty of fluids and rest  The spread of the infection may be reduced if kept at home during the first few days of illness, however it is still possibly contagious weeks after resolution of symptoms  Follow up with PCP in 3-5 days  Proceed to  ER if symptoms worsen  Chief Complaint     Chief Complaint   Patient presents with   • Cold Like Symptoms     Pt  Possibly exposed to hand, foot and mouth, pt  Started with chills Th night, has a rash to hands, pain in feet, lymph nodes swollen, sore throat, denies fever, pt  Called pcp and was given tamiflu, felt better next day and stopped taking tamiflu, treated with benadryl for rash with no relief, rash to face, denies mouth sores, pt  Currently has a cold sore on right lip          History of Present Illness       Reports cold sore on lip  Patient has a history of HSV-1  Rash  This is a new problem  Episode onset: 2-3 days  Location: hand, feet, face  The rash is characterized by redness (very mild 2/10 pain)  She was exposed to an ill contact (HFMD)  Associated symptoms include congestion and a sore throat  Pertinent negatives include no cough, diarrhea, fever, rhinorrhea, shortness of breath or vomiting   Treatments tried: benadryl, tamiflu-discontinued after 1 day  Review of Systems   Review of Systems   Constitutional: Positive for chills  Negative for fever  HENT: Positive for congestion and sore throat  Negative for ear discharge, ear pain, hearing loss, postnasal drip, rhinorrhea, sinus pressure, sinus pain, tinnitus and trouble swallowing  Respiratory: Negative for cough and shortness of breath  Gastrointestinal: Negative for abdominal pain, constipation, diarrhea, nausea and vomiting  Musculoskeletal: Positive for myalgias  Skin: Positive for rash  Neurological: Negative for headaches           Current Medications       Current Outpatient Medications:   •  al mag oxide-diphenhydramine-lidocaine viscous (MAGIC MOUTHWASH) 1:1:1 suspension, Swish and spit 10 mL every 4 (four) hours as needed for mouth pain or discomfort, Disp: 150 mL, Rfl: 0  •  clonazePAM (KlonoPIN) 1 mg tablet, Take 1 tablet (1 mg total) by mouth daily at bedtime, Disp: 30 tablet, Rfl: 0  •  famciclovir (FAMVIR) 500 mg tablet, Take 3 tablets (1,500 mg total) by mouth 1 (one) time for 1 dose, Disp: 3 tablet, Rfl: 0  •  levonorgestrel-ethinyl estradiol (NORDETTE) 0 15-30 MG-MCG per tablet, , Disp: , Rfl:   •  Melatonin 5 MG TABS, Take 5 mg by mouth daily, Disp: , Rfl:   •  SUMAtriptan Succinate 6 MG/0 5ML SOAJ, Inject 0 5mL at onset of headache and may repeat in 2 hours, MAX 2 inject a day MAX 3 days/week, Disp: 1 mL, Rfl: 0  •  ASHWAGANDHA PO, , Disp: , Rfl:   •  benzonatate (TESSALON PERLES) 100 mg capsule, Take 1 capsule (100 mg total) by mouth 3 (three) times a day as needed for cough, Disp: 20 capsule, Rfl: 0  •  Cholecalciferol (REPLESTA PO), Take 10,000 Units by mouth once a week  (Patient not taking: Reported on 9/23/2022), Disp: , Rfl:   •  cyanocobalamin 1,000 mcg/mL, Inject IM every 30 days x 6 months (Patient not taking: No sig reported), Disp: 1 mL, Rfl: 5  •  levonorgestrel-ethinyl estradiol (AVIANE,ALESSE,LESSINA) 0 1-20 MG-MCG per tablet, Take 1 tablet by mouth daily   (Patient not taking: Reported on 9/23/2022), Disp: , Rfl:     Current Allergies     Allergies as of 10/30/2022 - Reviewed 10/30/2022   Allergen Reaction Noted   • Augmentin [amoxicillin-pot clavulanate] GI Intolerance and Vomiting 05/28/2015   • Nsaids  04/17/2017   • Pollen extract  05/12/2004            The following portions of the patient's history were reviewed and updated as appropriate: allergies, current medications, past family history, past medical history, past social history, past surgical history and problem list      Past Medical History:   Diagnosis Date   • Abnormal LFTs (liver function tests)     LAST ASSESSED: [id number]   • Adalimumab (Humira) long-term use     LAST ASSESSED: [id number]   • Affective disorder ([facility] )     LAST ASSESSED: [id number]   • Anxiety    • Arthritis    • Aspiration into airway     LAST ASSESSED: [id number]   • Benign neoplasm of pituitary gland (HCC)     LAST ASSESSED: [id number]   • Chondromalacia, patella     LAST ASSESSED: [id number]   • Common migraine without aura     LAST ASSESSED: 77CHN1134   • Depression with anxiety    • Elevated C-reactive protein     LAST ASSESSED: [id number]   • Exertional dyspnea     LAST ASSESSED: 26KIO2385   • GERD (gastroesophageal reflux disease)    • Hemicrania continua     LAST ASSESSED: 73MUU6711   • High protein C activity level     LAST ASSESSED: 03VAF4569   • Hyperprolactinemia ([address] )     LAST ASSESSED: 67MKB4679   • Long-term use of hydroxychloroquine     LAST ASSESSED: 80BXC3963   • Migraine     LAST ASSESSED: 68NSW0942   • Polyarthritis     LAST ASSESSED: 58MUQ7158   • Polycystic ovarian disease    • Prediabetes     LAST ASSESSED: 15PVA0888   • Rheumatoid arthritis ([address] )     LAST ASSESSED: 45HHK1453   • Seronegative rheumatoid arthritis ([facility] )     LAST ASSESSED: 49FGE5741   • TMJ (dislocation of temporomandibular joint)     APPEARANCE LAST ASSESSED: [id number]   • Trochanteric bursitis of right hip     LAST ASSESSED: 96MYK1495   • Vitamin D deficiency     LAST ASSESSED: 14SDH6487       Past Surgical History:   Procedure Laterality Date   • CHOLECYSTECTOMY     • DENTAL SURGERY     • GASTRECTOMY      SLEEVE RESOLVED: [id number]   • NASAL SEPTUM SURGERY      DEVIATION REPAIR   • NASAL SEPTUM SURGERY     • TONSILLECTOMY     • TONSILLECTOMY     • TOOTH EXTRACTION     • WISDOM TOOTH EXTRACTION     • WISDOM TOOTH EXTRACTION         Family History   Problem Relation Age of Onset   • Arthritis Mother    • Psoriasis Mother    • No Known Problems Father    • Lung disease Maternal Grandfather         BLACK   • Diabetes Paternal Grandmother    • Cancer Paternal Grandfather    • Coronary artery disease Family    • Colon cancer Family    • Diabetes Family          Medications have been verified  Objective   /62   Pulse 95   Temp (!) 97 2 °F (36 2 °C)   Resp 18   Ht 5' 6" (1 676 m)   Wt 86 7 kg (191 lb 2 2 oz)   LMP 10/17/2022   SpO2 99%   BMI 30 85 kg/m²   Patient's last menstrual period was 10/17/2022  Physical Exam     Physical Exam  Constitutional:       General: She is not in acute distress  Appearance: She is well-developed  HENT:      Right Ear: Tympanic membrane and external ear normal       Left Ear: Tympanic membrane and external ear normal       Mouth/Throat:      Mouth: Mucous membranes are moist       Pharynx: No oropharyngeal exudate or posterior oropharyngeal erythema  Cardiovascular:      Rate and Rhythm: Normal rate and regular rhythm  Heart sounds: Normal heart sounds  No murmur heard  No friction rub  No gallop  Pulmonary:      Effort: Pulmonary effort is normal  No respiratory distress  Breath sounds: Normal breath sounds  No wheezing or rales  Chest:      Chest wall: No tenderness  Lymphadenopathy:      Cervical: No cervical adenopathy  Skin:     General: Skin is warm  Findings: Rash (erythematous maculopapular rash over hands, feet and face) present  Neurological:      Mental Status: She is alert  Psychiatric:         Behavior: Behavior normal          Thought Content:  Thought content normal          Judgment: Judgment normal

## 2022-10-30 NOTE — PATIENT INSTRUCTIONS
Magic mouthwash as needed  Tylenol for discomfort or fever  Clean surfaces that came in contact with saliva, feces, or other bodily fluids with diluted chlorine containing bleach  Frequently wash hands  Plenty of fluids and rest  The spread of the infection may be reduced if kept at home during the first few days of illness, however it is still possibly contagious weeks after resolution of symptoms  Follow up with PCP in 3-5 days  Proceed to  ER if symptoms worsen

## 2022-10-30 NOTE — LETTER
October 30, 2022     Patient: Kendra Encinas   YOB: 1985   Date of Visit: 10/30/2022       To Whom It May Concern: It is my medical opinion that [facility] may return provided symptoms have improved and has remained fever free for 24 hours without fever reducing medications  If you have any questions or concerns, please don't hesitate to call           Sincerely,        Jayy Garcia PA-C    CC: No Recipients

## 2022-11-21 DIAGNOSIS — F41.9 ANXIETY: ICD-10-CM

## 2022-11-22 RX ORDER — CLONAZEPAM 1 MG/1
1 TABLET ORAL
Qty: 30 TABLET | Refills: 0 | Status: SHIPPED | OUTPATIENT
Start: 2022-11-22

## 2022-12-13 ENCOUNTER — OFFICE VISIT (OUTPATIENT)
Dept: FAMILY MEDICINE CLINIC | Facility: CLINIC | Age: 37
End: 2022-12-13

## 2022-12-13 VITALS
DIASTOLIC BLOOD PRESSURE: 84 MMHG | OXYGEN SATURATION: 97 % | HEART RATE: 81 BPM | BODY MASS INDEX: 30.22 KG/M2 | SYSTOLIC BLOOD PRESSURE: 116 MMHG | WEIGHT: 188 LBS | HEIGHT: 66 IN | TEMPERATURE: 97.5 F

## 2022-12-13 DIAGNOSIS — F41.9 ANXIETY: ICD-10-CM

## 2022-12-13 RX ORDER — CLONAZEPAM 1 MG/1
1 TABLET ORAL 2 TIMES DAILY
Qty: 60 TABLET | Refills: 0 | Status: SHIPPED | OUTPATIENT
Start: 2022-12-13

## 2022-12-13 NOTE — PROGRESS NOTES
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 says she is really stretching a thin this is also coming up on the anniversary of her 's passing so it is just not a good time for her so were going to bump her Klonopin for a month or so she does do counseling and then we will reduce the dosage back at right prior to the next visit      The following portions of the patient's history were reviewed and updated as appropriate:   She has a past medical history of Abnormal LFTs (liver function tests), Adalimumab (Humira) long-term use, Affective disorder ([address] ), Anxiety, Arthritis, Aspiration into airway, Benign neoplasm of pituitary gland ([address] ), Chondromalacia, patella, Common migraine without aura, Depression with anxiety, Elevated C-reactive protein, Exertional dyspnea, GERD (gastroesophageal reflux disease), Hemicrania continua, High protein C activity level, Hyperprolactinemia ([address] ), Long-term use of hydroxychloroquine, Migraine, Polyarthritis, Polycystic ovarian disease, Prediabetes, Rheumatoid arthritis ([address] ), Seronegative rheumatoid arthritis ([address] ), TMJ (dislocation of temporomandibular joint), Trochanteric bursitis of right hip, and Vitamin D deficiency  ,  does not have any pertinent problems on file  ,   has a past surgical history that includes [place] tooth extraction; Tonsillectomy; Nasal septum surgery; [place] tooth extraction; Nasal septum surgery; Tonsillectomy; Dental surgery; Gastrectomy;  Tooth extraction; and Cholecystectomy  ,  family history includes Arthritis in her mother; Cancer in her paternal grandfather; Colon cancer in her family; Coronary artery disease in her family; Diabetes in her family and paternal grandmother; Lung disease in her maternal grandfather; No Known Problems in her father; Psoriasis in her mother  ,   reports that she has never smoked  She has never used smokeless tobacco  She reports that she does not currently use alcohol  She reports that she does not use drugs  ,  is allergic to augmentin [amoxicillin-pot clavulanate], nsaids, and pollen extract     Current Outpatient Medications   Medication Sig Dispense Refill   • Cholecalciferol (REPLESTA PO) Take 10,000 Units by mouth once a week     • clonazePAM (KlonoPIN) 1 mg tablet Take 1 tablet (1 mg total) by mouth daily at bedtime 30 tablet 0   • levonorgestrel-ethinyl estradiol (AVIANE,ALESSE,LESSINA) 0 1-20 MG-MCG per tablet Take 1 tablet by mouth daily     • levonorgestrel-ethinyl estradiol (NORDETTE) 0 15-30 MG-MCG per tablet      • Melatonin 5 MG TABS Take 5 mg by mouth daily     • SUMAtriptan Succinate 6 MG/0 5ML SOAJ Inject 0 5mL at onset of headache and may repeat in 2 hours, MAX 2 inject a day MAX 3 days/week 1 mL 0   • benzonatate (TESSALON PERLES) 100 mg capsule Take 1 capsule (100 mg total) by mouth 3 (three) times a day as needed for cough (Patient not taking: Reported on 12/13/2022) 20 capsule 0   • famciclovir (FAMVIR) 500 mg tablet Take 3 tablets (1,500 mg total) by mouth 1 (one) time for 1 dose 3 tablet 0     No current facility-administered medications for this visit  Review of Systems   Constitutional: Negative for activity change, appetite change, diaphoresis, fatigue and fever  HENT: Negative  Negative for dental problem  Eyes: Negative  Respiratory: Negative for apnea, cough, chest tightness, shortness of breath and wheezing      Cardiovascular: Negative for chest pain, palpitations and leg swelling  Gastrointestinal: Negative for abdominal distention, abdominal pain, anal bleeding, constipation, diarrhea, nausea and vomiting  Endocrine: Negative for cold intolerance, heat intolerance, polydipsia, polyphagia and polyuria  Genitourinary: Negative for difficulty urinating, dysuria, flank pain, hematuria and urgency  Musculoskeletal: Negative for arthralgias, back pain, gait problem, joint swelling and myalgias  Skin: Negative for color change, rash and wound  Allergic/Immunologic: Negative for environmental allergies, food allergies and immunocompromised state  Neurological: Negative for dizziness, seizures, syncope, speech difficulty, numbness and headaches  Hematological: Negative for adenopathy  Does not bruise/bleed easily  Psychiatric/Behavioral: Positive for dysphoric mood  Negative for agitation, behavioral problems, hallucinations, sleep disturbance and suicidal ideas  Objective:  Vitals:    12/13/22 1041   BP: 116/84   BP Location: Left arm   Patient Position: Sitting   Cuff Size: Standard   Pulse: 81   Temp: 97 5 °F (36 4 °C)   TempSrc: Temporal   SpO2: 97%   Weight: 85 3 kg (188 lb)   Height: 5' 6" (1 676 m)     Body mass index is 30 34 kg/m²  Physical Exam  Constitutional:       General: She is not in acute distress  Appearance: She is well-developed  She is not diaphoretic  HENT:      Head: Normocephalic  Right Ear: External ear normal       Left Ear: External ear normal       Nose: Nose normal    Eyes:      General: No scleral icterus  Right eye: No discharge  Left eye: No discharge  Conjunctiva/sclera: Conjunctivae normal       Pupils: Pupils are equal, round, and reactive to light  Neck:      Thyroid: No thyromegaly  Trachea: No tracheal deviation  Cardiovascular:      Rate and Rhythm: Normal rate and regular rhythm  Heart sounds: Normal heart sounds  No murmur heard  No friction rub  No gallop     Pulmonary: Effort: Pulmonary effort is normal  No respiratory distress  Breath sounds: Normal breath sounds  No wheezing  Abdominal:      General: Bowel sounds are normal       Palpations: Abdomen is soft  There is no mass  Tenderness: There is no abdominal tenderness  There is no guarding  Musculoskeletal:         General: No deformity  Cervical back: Normal range of motion  Lymphadenopathy:      Cervical: No cervical adenopathy  Skin:     General: Skin is warm and dry  Findings: No erythema or rash  Neurological:      Mental Status: She is alert and oriented to person, place, and time  Cranial Nerves: No cranial nerve deficit  Psychiatric:         Thought Content:  Thought content normal

## 2022-12-30 ENCOUNTER — PATIENT MESSAGE (OUTPATIENT)
Dept: FAMILY MEDICINE CLINIC | Facility: CLINIC | Age: 37
End: 2022-12-30

## 2022-12-30 ENCOUNTER — LAB (OUTPATIENT)
Dept: LAB | Facility: HOSPITAL | Age: 37
End: 2022-12-30
Attending: FAMILY MEDICINE

## 2022-12-30 DIAGNOSIS — E61.1 LOW SERUM IRON: Primary | ICD-10-CM

## 2022-12-30 DIAGNOSIS — D50.9 IRON DEFICIENCY ANEMIA, UNSPECIFIED IRON DEFICIENCY ANEMIA TYPE: ICD-10-CM

## 2022-12-30 LAB
FERRITIN SERPL-MCNC: 7 NG/ML (ref 8–388)
IRON SATN MFR SERPL: 8 % (ref 15–50)
IRON SERPL-MCNC: 26 UG/DL (ref 50–170)
TIBC SERPL-MCNC: 345 UG/DL (ref 250–450)

## 2022-12-30 NOTE — RESULT ENCOUNTER NOTE
Please call the patient regarding her abnormal result    Patient has very low iron levels and probably needs true venous iron infusions please arrange a consult with hematology

## 2023-01-06 ENCOUNTER — TELEPHONE (OUTPATIENT)
Dept: HEMATOLOGY ONCOLOGY | Facility: CLINIC | Age: 38
End: 2023-01-06

## 2023-01-06 ENCOUNTER — CONSULT (OUTPATIENT)
Dept: HEMATOLOGY ONCOLOGY | Facility: CLINIC | Age: 38
End: 2023-01-06

## 2023-01-06 VITALS
HEIGHT: 66 IN | HEART RATE: 69 BPM | DIASTOLIC BLOOD PRESSURE: 68 MMHG | SYSTOLIC BLOOD PRESSURE: 114 MMHG | BODY MASS INDEX: 30.05 KG/M2 | WEIGHT: 187 LBS | OXYGEN SATURATION: 99 % | RESPIRATION RATE: 18 BRPM | TEMPERATURE: 97.7 F

## 2023-01-06 DIAGNOSIS — D51.9 ANEMIA DUE TO VITAMIN B12 DEFICIENCY, UNSPECIFIED B12 DEFICIENCY TYPE: ICD-10-CM

## 2023-01-06 DIAGNOSIS — D50.8 IRON DEFICIENCY ANEMIA FOLLOWING BARIATRIC SURGERY: ICD-10-CM

## 2023-01-06 DIAGNOSIS — E22.1 HYPERPROLACTINEMIA (HCC): ICD-10-CM

## 2023-01-06 DIAGNOSIS — K95.89 IRON DEFICIENCY ANEMIA FOLLOWING BARIATRIC SURGERY: ICD-10-CM

## 2023-01-06 DIAGNOSIS — K91.2 POSTGASTRECTOMY MALABSORPTION: ICD-10-CM

## 2023-01-06 DIAGNOSIS — E61.1 LOW SERUM IRON: Primary | ICD-10-CM

## 2023-01-06 DIAGNOSIS — Z90.3 H/O GASTRIC SLEEVE: ICD-10-CM

## 2023-01-06 DIAGNOSIS — Z90.3 POSTGASTRECTOMY MALABSORPTION: ICD-10-CM

## 2023-01-06 RX ORDER — OSELTAMIVIR PHOSPHATE 75 MG/1
CAPSULE ORAL
COMMUNITY
Start: 2022-10-28 | End: 2023-01-06 | Stop reason: ALTCHOICE

## 2023-01-06 RX ORDER — SODIUM CHLORIDE 9 MG/ML
20 INJECTION, SOLUTION INTRAVENOUS ONCE
OUTPATIENT
Start: 2023-01-17

## 2023-01-06 RX ORDER — COVID-19 ANTIGEN TEST
KIT MISCELLANEOUS
COMMUNITY
Start: 2022-11-30

## 2023-01-06 RX ORDER — CYANOCOBALAMIN 1000 UG/ML
1000 INJECTION, SOLUTION INTRAMUSCULAR; SUBCUTANEOUS ONCE
OUTPATIENT
Start: 2023-01-17 | End: 2023-01-18

## 2023-01-06 NOTE — PROGRESS NOTES
22 [facility] [place] HEMATOLOGY ONCOLOGY [address] [facility]   [address] [facility] [id number] [phone]  Hematology Ambulatory Consult  Js Jaimes, 1985, [id number]  1/6/2023      Assessment and Plan   1  Low serum iron  2  Postgastrectomy malabsorption  3  H/O gastric sleeve  4  Iron deficiency anemia following bariatric surgery  5  Anemia due to vitamin B12 deficiency, unspecified B12 deficiency type  6  Hyperprolactinemia ([address] )   Patient is a 30-year-old female with a history of abnormal LFTs, seasonal affective disorder, anxiety, arthritis, GERD, polycystic ovarian disease, rheumatoid arthritis, and gastric sleeve bariatric surgery in 2017  She was referred to us for further evaluation and management of iron deficiency  Patient had been following with bariatrics and was stable for some time  She is on oral vitamin supplement that contains iron  She started noticing increased fatigue, feeling cold, nailbeds cracking, and scattered thoughts  He states in 2021 she had a vaginal delivery and lost approximately 400 mL of blood  She had iron infusions with Venofer postpartum  Most recent iron saturation from 12/30/2022 was 8% with a ferritin level of 7  Previous CBC is from August 2022 that showed a hemoglobin of 15 7, MCV 89, otherwise normal   Vitamin B12 at that time was low at 172  We will get an updated baseline CBC and vitamin B12 level however based on previous numbers and current iron saturation we will make arrangements for Venofer 300 mg IV x3 infusions  Patient states she tolerated Venofer without difficulty in the past   We will also add vitamin B12 injection 1000 mcg IM to the plan  We discussed obtaining blood in 3-month intervals, managing accordingly, and I will see patient in 6 months  Patient may need maintenance infusions in the future however at what interval is yet to be decided  Patient verbalized understanding and is in agreement with the plan      - Ambulatory Referral to Hematology / Oncology  - CBC and differential; Standing  - Comprehensive metabolic panel; Standing  - Iron Panel (Includes Ferritin, Iron Sat%, Iron, and TIBC); Future  - Vitamin B12; Standing  - CBC and differential  - Comprehensive metabolic panel  - Vitamin B12  - Prolactin, Macroadenoma; Future    Barrier(s) to care: None  The patient is  able to self care  [address] [place] [address]    Subjective     Chief Complaint   Patient presents with   • Follow-up       Referring provider    Kush Montero, DO  [address], [address]    History of present illness:  Patient is a 34-year-old female with a history of abnormal LFTs, seasonal affective disorder, anxiety, arthritis, GERD, polycystic ovarian disease, rheumatoid arthritis, and gastric sleeve bariatric surgery in 2017  She was referred to us for further evaluation and management of iron deficiency  01/06/23: clinically stable    Review of Systems   Constitutional: Positive for fatigue  Negative for activity change, appetite change, fever and unexpected weight change  Feels cold all the time   Respiratory: Negative for cough and shortness of breath  Cardiovascular: Negative for chest pain and leg swelling  Gastrointestinal: Negative for abdominal pain, constipation, diarrhea and nausea  Endocrine: Negative for cold intolerance and heat intolerance  Musculoskeletal: Positive for myalgias  Negative for arthralgias  Skin: Negative  Neurological: Negative for dizziness, weakness and headaches  Hematological: Negative for adenopathy  Does not bruise/bleed easily  Psychiatric/Behavioral: Positive for decreased concentration       Past Medical History:   Diagnosis Date   • Abnormal LFTs (liver function tests)     LAST ASSESSED: [id number]   • Adalimumab (Humira) long-term use     LAST ASSESSED: 26OCT2016   • Affective disorder ([facility] [address] )     LAST ASSESSED: 82OVV3749   • Anxiety    • Arthritis    • Aspiration into airway     LAST ASSESSED: 57BXN9297   • Benign neoplasm of pituitary gland (HCC)     LAST ASSESSED: 80DWH4160   • Chondromalacia, patella     LAST ASSESSED: 59YBG2543   • Common migraine without aura     LAST ASSESSED: 08RWW0749   • Depression with anxiety    • Elevated C-reactive protein     LAST ASSESSED: 71NGN6850   • Exertional dyspnea     LAST ASSESSED: 71VQO1783   • GERD (gastroesophageal reflux disease)    • Hemicrania continua     LAST ASSESSED: 49BYI5510   • High protein C activity level     LAST ASSESSED: 65FZJ2871   • Hyperprolactinemia (HCC)     LAST ASSESSED: 09HXU3066   • Long-term use of hydroxychloroquine     LAST ASSESSED: 27WEC5514   • Migraine     LAST ASSESSED: 57BMJ7636   • Polyarthritis     LAST ASSESSED: 37FAJ7551   • Polycystic ovarian disease    • Prediabetes     LAST ASSESSED: 67MKU1201   • Rheumatoid arthritis (HCC)     LAST ASSESSED: 92OAR5997   • Seronegative rheumatoid arthritis (HCC)     LAST ASSESSED: 28BWC6964   • TMJ (dislocation of temporomandibular joint)     APPEARANCE LAST ASSESSED: 83AUT2651   • Trochanteric bursitis of right hip     LAST ASSESSED: 91DZV8731   • Vitamin D deficiency     LAST ASSESSED: 03APD4634     Past Surgical History:   Procedure Laterality Date   • CHOLECYSTECTOMY     • DENTAL SURGERY     • GASTRECTOMY      SLEEVE RESOLVED: 69POY3770   • NASAL SEPTUM SURGERY      DEVIATION REPAIR   • NASAL SEPTUM SURGERY     • TONSILLECTOMY     • TONSILLECTOMY     • TOOTH EXTRACTION     • WISDOM TOOTH EXTRACTION     • WISDOM TOOTH EXTRACTION       Family History   Problem Relation Age of Onset   • Arthritis Mother    • Psoriasis Mother    • No Known Problems Father    • Lung disease Maternal Grandfather         BLACK   • Diabetes Paternal Grandmother    • Cancer Paternal Grandfather    • Coronary artery disease Family    • Colon cancer Family    • Diabetes Family      Social History     Socioeconomic History   • Marital status: /Civil Union     Spouse name: None   • Number of children: None   • Years of education: HS OR GED   • Highest education level: None   Occupational History   • Occupation: CUSTOMER SERVICE     Comment: FULL-TIME EMPLOYMENT   Tobacco Use   • Smoking status: Never   • Smokeless tobacco: Never   Vaping Use   • Vaping Use: Never used   Substance and Sexual Activity   • Alcohol use: Not Currently     Comment: OCCASIONAL - 1 DRINK/MONTH AS PER [employer]   • Drug use: No   • Sexual activity: Yes     Partners: Male   Other Topics Concern   • None   Social History Narrative    CAFFEINE USE    PATIENT HAS LIVING WILL     Social Determinants of Health     Financial Resource Strain: Not on file   Food Insecurity: Not on file   Transportation Needs: Not on file   Physical Activity: Not on file   Stress: Not on file   Social Connections: Not on file   Intimate Partner Violence: Not on file   Housing Stability: Not on file       Current Outpatient Medications:   •  Cholecalciferol (REPLESTA PO), Take 10,000 Units by mouth once a week, Disp: , Rfl:   •  Clinitest Rapid COVID-19 Test KIT, , Disp: , Rfl:   •  clonazePAM (KlonoPIN) 1 mg tablet, Take 1 tablet (1 mg total) by mouth 2 (two) times a day, Disp: 60 tablet, Rfl: 0  •  levonorgestrel-ethinyl estradiol (AVIANE,ALESSE,LESSINA) 0 1-20 MG-MCG per tablet, Take 1 tablet by mouth daily, Disp: , Rfl:   •  levonorgestrel-ethinyl estradiol (NORDETTE) 0 15-30 MG-MCG per tablet, , Disp: , Rfl:   •  Melatonin 5 MG TABS, Take 5 mg by mouth daily, Disp: , Rfl:   •  SUMAtriptan Succinate 6 MG/0 5ML SOAJ, Inject 0 5mL at onset of headache and may repeat in 2 hours, MAX 2 inject a day MAX 3 days/week, Disp: 1 mL, Rfl: 0  •  famciclovir (FAMVIR) 500 mg tablet, Take 3 tablets (1,500 mg total) by mouth 1 (one) time for 1 dose, Disp: 3 tablet, Rfl: 0  Allergies   Allergen Reactions   • Augmentin [Amoxicillin-Pot Clavulanate] GI Intolerance and Vomiting Category: Adverse Reaction;    • Nsaids      Other reaction(s): gastric sleeve   • Pollen Extract      Other reaction(s): Other (Please comment)  Pt has post nasal drip,sneezing,eyes watery  Objective   /68 (BP Location: Left arm, Patient Position: Sitting, Cuff Size: Extra-Large)   Pulse 69   Temp 97 7 °F (36 5 °C) (Tympanic)   Resp 18   Ht 5' 6" (1 676 m)   Wt 84 8 kg (187 lb)   LMP 12/11/2022   SpO2 99%   BMI 30 18 kg/m²   Physical Exam  Vitals reviewed  Constitutional:       Appearance: Normal appearance  She is well-developed  HENT:      Head: Normocephalic and atraumatic  Eyes:      Conjunctiva/sclera: Conjunctivae normal       Pupils: Pupils are equal, round, and reactive to light  Pulmonary:      Effort: Pulmonary effort is normal  No respiratory distress  Musculoskeletal:         General: Normal range of motion  Cervical back: Normal range of motion  Lymphadenopathy:      Cervical: No cervical adenopathy  Skin:     General: Skin is dry  Neurological:      Mental Status: She is alert and oriented to person, place, and time  Psychiatric:         Behavior: Behavior normal      Result Review  Labs:  Lab on 12/30/2022   Component Date Value Ref Range Status   • Iron Saturation 12/30/2022 8 (L)  15 - 50 % Final   • TIBC 12/30/2022 345  250 - 450 ug/dL Final   • Iron 12/30/2022 26 (L)  50 - 170 ug/dL Final    Patients treated with metal-binding drugs (ie  Deferoxamine) may have depressed iron values  • Ferritin 12/30/2022 7 (L)  8 - 388 ng/mL Final     Please note: This report has been generated by a voice recognition software system  Therefore there may be syntax, spelling, and/or grammatical errors  Please call if you have any questions

## 2023-01-06 NOTE — TELEPHONE ENCOUNTER
While we try to accommodate patient requests, our priority is to schedule treatment according to Doctor's orders and site availability  1  Does the Provider use the intake sheet or checkout note? No   2  What would be a preferred day of the week that would work best for your infusion appointment? Tuesday/Wedbesday  3  Do you prefer mornings or afternoons for your appointments? Am  4  Are there any days or dates that do not work for your schedule, including any upcoming vacations? No   We are going to try our best to schedule you at the infusion center closest to your home  In the event that we are unable to what would be your next preferred infusion site or sites? [facility]    5  Do you have transportation to take you to all of your appointments? Yes  6   Would you like the infusion center to draw labs from your port? (disregard if patient doesn't have a port or need labs for infusion appointment) No

## 2023-01-06 NOTE — TELEPHONE ENCOUNTER
Message left for patient to call back to go over new infusion appts  My TEAMS number was left for call back

## 2023-01-08 ENCOUNTER — APPOINTMENT (OUTPATIENT)
Dept: LAB | Facility: HOSPITAL | Age: 38
End: 2023-01-08

## 2023-01-08 DIAGNOSIS — K95.89 IRON DEFICIENCY ANEMIA FOLLOWING BARIATRIC SURGERY: ICD-10-CM

## 2023-01-08 DIAGNOSIS — Z90.3 H/O GASTRIC SLEEVE: ICD-10-CM

## 2023-01-08 DIAGNOSIS — E22.1 HYPERPROLACTINEMIA (HCC): ICD-10-CM

## 2023-01-08 DIAGNOSIS — Z90.3 POSTGASTRECTOMY MALABSORPTION: ICD-10-CM

## 2023-01-08 DIAGNOSIS — K91.2 POSTGASTRECTOMY MALABSORPTION: ICD-10-CM

## 2023-01-08 DIAGNOSIS — D50.8 IRON DEFICIENCY ANEMIA FOLLOWING BARIATRIC SURGERY: ICD-10-CM

## 2023-01-08 DIAGNOSIS — D51.9 ANEMIA DUE TO VITAMIN B12 DEFICIENCY, UNSPECIFIED B12 DEFICIENCY TYPE: ICD-10-CM

## 2023-01-08 DIAGNOSIS — E61.1 LOW SERUM IRON: ICD-10-CM

## 2023-01-08 LAB
ALBUMIN SERPL BCP-MCNC: 3.6 G/DL (ref 3.5–5)
ALP SERPL-CCNC: 43 U/L (ref 46–116)
ALT SERPL W P-5'-P-CCNC: 16 U/L (ref 12–78)
ANION GAP SERPL CALCULATED.3IONS-SCNC: 7 MMOL/L (ref 4–13)
AST SERPL W P-5'-P-CCNC: 12 U/L (ref 5–45)
BASOPHILS # BLD AUTO: 0.05 THOUSANDS/ÂΜL (ref 0–0.1)
BASOPHILS NFR BLD AUTO: 1 % (ref 0–1)
BILIRUB SERPL-MCNC: 0.42 MG/DL (ref 0.2–1)
BUN SERPL-MCNC: 10 MG/DL (ref 5–25)
CALCIUM SERPL-MCNC: 9.1 MG/DL (ref 8.3–10.1)
CHLORIDE SERPL-SCNC: 105 MMOL/L (ref 96–108)
CO2 SERPL-SCNC: 26 MMOL/L (ref 21–32)
CREAT SERPL-MCNC: 0.84 MG/DL (ref 0.6–1.3)
EOSINOPHIL # BLD AUTO: 0.12 THOUSAND/ÂΜL (ref 0–0.61)
EOSINOPHIL NFR BLD AUTO: 2 % (ref 0–6)
ERYTHROCYTE [DISTWIDTH] IN BLOOD BY AUTOMATED COUNT: 13.3 % (ref 11.6–15.1)
GFR SERPL CREATININE-BSD FRML MDRD: 88 ML/MIN/1.73SQ M
GLUCOSE P FAST SERPL-MCNC: 98 MG/DL (ref 65–99)
HCT VFR BLD AUTO: 39.4 % (ref 34.8–46.1)
HGB BLD-MCNC: 12.8 G/DL (ref 11.5–15.4)
IMM GRANULOCYTES # BLD AUTO: 0.01 THOUSAND/UL (ref 0–0.2)
IMM GRANULOCYTES NFR BLD AUTO: 0 % (ref 0–2)
LYMPHOCYTES # BLD AUTO: 2.84 THOUSANDS/ÂΜL (ref 0.6–4.47)
LYMPHOCYTES NFR BLD AUTO: 47 % (ref 14–44)
MCH RBC QN AUTO: 28.4 PG (ref 26.8–34.3)
MCHC RBC AUTO-ENTMCNC: 32.5 G/DL (ref 31.4–37.4)
MCV RBC AUTO: 87 FL (ref 82–98)
MONOCYTES # BLD AUTO: 0.41 THOUSAND/ÂΜL (ref 0.17–1.22)
MONOCYTES NFR BLD AUTO: 7 % (ref 4–12)
NEUTROPHILS # BLD AUTO: 2.56 THOUSANDS/ÂΜL (ref 1.85–7.62)
NEUTS SEG NFR BLD AUTO: 43 % (ref 43–75)
NRBC BLD AUTO-RTO: 0 /100 WBCS
PLATELET # BLD AUTO: 268 THOUSANDS/UL (ref 149–390)
PMV BLD AUTO: 10.2 FL (ref 8.9–12.7)
POTASSIUM SERPL-SCNC: 4 MMOL/L (ref 3.5–5.3)
PROLACTIN SERPL-MCNC: 6.8 NG/ML
PROT SERPL-MCNC: 7.8 G/DL (ref 6.4–8.4)
RBC # BLD AUTO: 4.51 MILLION/UL (ref 3.81–5.12)
SODIUM SERPL-SCNC: 138 MMOL/L (ref 135–147)
VIT B12 SERPL-MCNC: 201 PG/ML (ref 100–900)
WBC # BLD AUTO: 5.99 THOUSAND/UL (ref 4.31–10.16)

## 2023-01-09 NOTE — TELEPHONE ENCOUNTER
Message left for patient to call back to confirm that she is aware of her infusion appts    My TEAMS number was left for call back

## 2023-01-17 ENCOUNTER — HOSPITAL ENCOUNTER (OUTPATIENT)
Dept: INFUSION CENTER | Facility: HOSPITAL | Age: 38
Discharge: HOME | End: 2023-01-17
Attending: INTERNAL MEDICINE

## 2023-01-17 VITALS
SYSTOLIC BLOOD PRESSURE: 133 MMHG | DIASTOLIC BLOOD PRESSURE: 86 MMHG | RESPIRATION RATE: 16 BRPM | HEART RATE: 69 BPM | TEMPERATURE: 97.5 F | OXYGEN SATURATION: 100 %

## 2023-01-17 DIAGNOSIS — D50.8 IRON DEFICIENCY ANEMIA FOLLOWING BARIATRIC SURGERY: ICD-10-CM

## 2023-01-17 DIAGNOSIS — D51.9 ANEMIA DUE TO VITAMIN B12 DEFICIENCY, UNSPECIFIED B12 DEFICIENCY TYPE: ICD-10-CM

## 2023-01-17 DIAGNOSIS — K91.2 POSTGASTRECTOMY MALABSORPTION: Primary | ICD-10-CM

## 2023-01-17 DIAGNOSIS — K95.89 IRON DEFICIENCY ANEMIA FOLLOWING BARIATRIC SURGERY: ICD-10-CM

## 2023-01-17 DIAGNOSIS — Z90.3 POSTGASTRECTOMY MALABSORPTION: Primary | ICD-10-CM

## 2023-01-17 DIAGNOSIS — Z90.3 H/O GASTRIC SLEEVE: ICD-10-CM

## 2023-01-17 RX ORDER — CYANOCOBALAMIN 1000 UG/ML
1000 INJECTION, SOLUTION INTRAMUSCULAR; SUBCUTANEOUS ONCE
Status: CANCELLED | OUTPATIENT
Start: 2023-01-17 | End: 2023-01-18

## 2023-01-17 RX ORDER — SODIUM CHLORIDE 9 MG/ML
20 INJECTION, SOLUTION INTRAVENOUS ONCE
Status: CANCELLED | OUTPATIENT
Start: 2023-01-24

## 2023-01-17 RX ORDER — CYANOCOBALAMIN 1000 UG/ML
1000 INJECTION, SOLUTION INTRAMUSCULAR; SUBCUTANEOUS ONCE
Status: COMPLETED | OUTPATIENT
Start: 2023-01-17 | End: 2023-01-17

## 2023-01-17 RX ORDER — SODIUM CHLORIDE 9 MG/ML
20 INJECTION, SOLUTION INTRAVENOUS ONCE
Status: COMPLETED | OUTPATIENT
Start: 2023-01-17 | End: 2023-01-17

## 2023-01-17 RX ADMIN — SODIUM CHLORIDE 20 ML/HR: 0.9 INJECTION, SOLUTION INTRAVENOUS at 12:33

## 2023-01-17 RX ADMIN — CYANOCOBALAMIN 1000 MCG: 1000 INJECTION, SOLUTION INTRAMUSCULAR; SUBCUTANEOUS at 12:56

## 2023-01-17 RX ADMIN — IRON SUCROSE 300 MG: 20 INJECTION, SOLUTION INTRAVENOUS at 12:41

## 2023-01-20 RX ORDER — ACETAMINOPHEN 325 MG/1
650 TABLET ORAL ONCE
Status: CANCELLED
Start: 2023-01-24 | End: 2023-01-20

## 2023-01-21 DIAGNOSIS — F41.9 ANXIETY: ICD-10-CM

## 2023-01-23 RX ORDER — CLONAZEPAM 1 MG/1
1 TABLET ORAL 2 TIMES DAILY
Qty: 60 TABLET | Refills: 0 | Status: SHIPPED | OUTPATIENT
Start: 2023-01-23

## 2023-01-24 ENCOUNTER — HOSPITAL ENCOUNTER (OUTPATIENT)
Dept: INFUSION CENTER | Facility: HOSPITAL | Age: 38
Discharge: HOME | End: 2023-01-24
Attending: INTERNAL MEDICINE

## 2023-01-24 VITALS
TEMPERATURE: 97.1 F | RESPIRATION RATE: 18 BRPM | DIASTOLIC BLOOD PRESSURE: 77 MMHG | SYSTOLIC BLOOD PRESSURE: 117 MMHG | OXYGEN SATURATION: 100 % | HEART RATE: 92 BPM

## 2023-01-24 DIAGNOSIS — D51.9 ANEMIA DUE TO VITAMIN B12 DEFICIENCY, UNSPECIFIED B12 DEFICIENCY TYPE: ICD-10-CM

## 2023-01-24 DIAGNOSIS — Z90.3 H/O GASTRIC SLEEVE: ICD-10-CM

## 2023-01-24 DIAGNOSIS — K91.2 POSTGASTRECTOMY MALABSORPTION: Primary | ICD-10-CM

## 2023-01-24 DIAGNOSIS — Z90.3 POSTGASTRECTOMY MALABSORPTION: Primary | ICD-10-CM

## 2023-01-24 DIAGNOSIS — K95.89 IRON DEFICIENCY ANEMIA FOLLOWING BARIATRIC SURGERY: ICD-10-CM

## 2023-01-24 DIAGNOSIS — D50.8 IRON DEFICIENCY ANEMIA FOLLOWING BARIATRIC SURGERY: ICD-10-CM

## 2023-01-24 RX ORDER — ACETAMINOPHEN 325 MG/1
650 TABLET ORAL ONCE
Status: CANCELLED
Start: 2023-01-31 | End: 2023-01-25

## 2023-01-24 RX ORDER — SODIUM CHLORIDE 9 MG/ML
20 INJECTION, SOLUTION INTRAVENOUS ONCE
Status: COMPLETED | OUTPATIENT
Start: 2023-01-24 | End: 2023-01-24

## 2023-01-24 RX ORDER — ACETAMINOPHEN 325 MG/1
650 TABLET ORAL ONCE
Status: COMPLETED | OUTPATIENT
Start: 2023-01-24 | End: 2023-01-24

## 2023-01-24 RX ORDER — SODIUM CHLORIDE 9 MG/ML
20 INJECTION, SOLUTION INTRAVENOUS ONCE
Status: CANCELLED | OUTPATIENT
Start: 2023-01-31

## 2023-01-24 RX ORDER — CYANOCOBALAMIN 1000 UG/ML
1000 INJECTION, SOLUTION INTRAMUSCULAR; SUBCUTANEOUS ONCE
Status: CANCELLED | OUTPATIENT
Start: 2023-01-31 | End: 2023-01-25

## 2023-01-24 RX ORDER — CYANOCOBALAMIN 1000 UG/ML
1000 INJECTION, SOLUTION INTRAMUSCULAR; SUBCUTANEOUS ONCE
Status: COMPLETED | OUTPATIENT
Start: 2023-01-24 | End: 2023-01-24

## 2023-01-24 RX ADMIN — SODIUM CHLORIDE 20 ML/HR: 0.9 INJECTION, SOLUTION INTRAVENOUS at 08:07

## 2023-01-24 RX ADMIN — CYANOCOBALAMIN 1000 MCG: 1000 INJECTION, SOLUTION INTRAMUSCULAR; SUBCUTANEOUS at 10:42

## 2023-01-24 RX ADMIN — DIPHENHYDRAMINE HYDROCHLORIDE 25 MG: 50 INJECTION, SOLUTION INTRAMUSCULAR; INTRAVENOUS at 08:14

## 2023-01-24 RX ADMIN — IRON SUCROSE 300 MG: 20 INJECTION, SOLUTION INTRAVENOUS at 08:52

## 2023-01-24 RX ADMIN — ACETAMINOPHEN 650 MG: 325 TABLET, FILM COATED ORAL at 08:11

## 2023-01-31 ENCOUNTER — HOSPITAL ENCOUNTER (OUTPATIENT)
Dept: INFUSION CENTER | Facility: HOSPITAL | Age: 38
Discharge: HOME | End: 2023-01-31
Attending: INTERNAL MEDICINE

## 2023-01-31 VITALS
TEMPERATURE: 98.3 F | SYSTOLIC BLOOD PRESSURE: 116 MMHG | RESPIRATION RATE: 18 BRPM | OXYGEN SATURATION: 98 % | HEART RATE: 62 BPM | DIASTOLIC BLOOD PRESSURE: 62 MMHG

## 2023-01-31 DIAGNOSIS — K95.89 IRON DEFICIENCY ANEMIA FOLLOWING BARIATRIC SURGERY: ICD-10-CM

## 2023-01-31 DIAGNOSIS — K91.2 POSTGASTRECTOMY MALABSORPTION: Primary | ICD-10-CM

## 2023-01-31 DIAGNOSIS — Z90.3 POSTGASTRECTOMY MALABSORPTION: Primary | ICD-10-CM

## 2023-01-31 DIAGNOSIS — Z90.3 H/O GASTRIC SLEEVE: ICD-10-CM

## 2023-01-31 DIAGNOSIS — D50.8 IRON DEFICIENCY ANEMIA FOLLOWING BARIATRIC SURGERY: ICD-10-CM

## 2023-01-31 DIAGNOSIS — D51.9 ANEMIA DUE TO VITAMIN B12 DEFICIENCY, UNSPECIFIED B12 DEFICIENCY TYPE: ICD-10-CM

## 2023-01-31 RX ORDER — SODIUM CHLORIDE 9 MG/ML
20 INJECTION, SOLUTION INTRAVENOUS ONCE
Status: COMPLETED | OUTPATIENT
Start: 2023-01-31 | End: 2023-01-31

## 2023-01-31 RX ORDER — ACETAMINOPHEN 325 MG/1
650 TABLET ORAL ONCE
Status: CANCELLED
Start: 2023-01-31 | End: 2023-01-31

## 2023-01-31 RX ORDER — SODIUM CHLORIDE 9 MG/ML
20 INJECTION, SOLUTION INTRAVENOUS ONCE
Status: CANCELLED | OUTPATIENT
Start: 2023-01-31

## 2023-01-31 RX ORDER — CYANOCOBALAMIN 1000 UG/ML
1000 INJECTION, SOLUTION INTRAMUSCULAR; SUBCUTANEOUS ONCE
Status: COMPLETED | OUTPATIENT
Start: 2023-01-31 | End: 2023-01-31

## 2023-01-31 RX ORDER — ACETAMINOPHEN 325 MG/1
650 TABLET ORAL ONCE
Status: COMPLETED | OUTPATIENT
Start: 2023-01-31 | End: 2023-01-31

## 2023-01-31 RX ORDER — CYANOCOBALAMIN 1000 UG/ML
1000 INJECTION, SOLUTION INTRAMUSCULAR; SUBCUTANEOUS ONCE
Status: CANCELLED | OUTPATIENT
Start: 2023-01-31 | End: 2023-01-31

## 2023-01-31 RX ADMIN — DIPHENHYDRAMINE HYDROCHLORIDE 25 MG: 50 INJECTION, SOLUTION INTRAMUSCULAR; INTRAVENOUS at 08:19

## 2023-01-31 RX ADMIN — ACETAMINOPHEN 650 MG: 325 TABLET, FILM COATED ORAL at 08:18

## 2023-01-31 RX ADMIN — SODIUM CHLORIDE 20 ML/HR: 0.9 INJECTION, SOLUTION INTRAVENOUS at 08:16

## 2023-01-31 RX ADMIN — IRON SUCROSE 300 MG: 20 INJECTION, SOLUTION INTRAVENOUS at 08:49

## 2023-01-31 RX ADMIN — CYANOCOBALAMIN 1000 MCG: 1000 INJECTION, SOLUTION INTRAMUSCULAR; SUBCUTANEOUS at 10:44

## 2023-02-21 DIAGNOSIS — M70.70 BURSITIS OF HIP, UNSPECIFIED BURSA, UNSPECIFIED LATERALITY: Primary | ICD-10-CM

## 2023-02-22 DIAGNOSIS — F41.9 ANXIETY: ICD-10-CM

## 2023-02-23 RX ORDER — CLONAZEPAM 1 MG/1
1 TABLET ORAL 2 TIMES DAILY
Qty: 60 TABLET | Refills: 0 | Status: SHIPPED | OUTPATIENT
Start: 2023-02-23

## 2023-02-24 ENCOUNTER — OFFICE VISIT (OUTPATIENT)
Dept: OBGYN CLINIC | Facility: CLINIC | Age: 38
End: 2023-02-24

## 2023-02-24 ENCOUNTER — APPOINTMENT (OUTPATIENT)
Dept: RADIOLOGY | Facility: MEDICAL CENTER | Age: 38
End: 2023-02-24

## 2023-02-24 VITALS
HEIGHT: 66 IN | HEART RATE: 80 BPM | SYSTOLIC BLOOD PRESSURE: 118 MMHG | DIASTOLIC BLOOD PRESSURE: 80 MMHG | WEIGHT: 184 LBS | BODY MASS INDEX: 29.57 KG/M2

## 2023-02-24 DIAGNOSIS — M25.551 RIGHT HIP PAIN: ICD-10-CM

## 2023-02-24 DIAGNOSIS — M25.552 LEFT HIP PAIN: Primary | ICD-10-CM

## 2023-02-24 DIAGNOSIS — M25.552 LEFT HIP PAIN: ICD-10-CM

## 2023-02-24 DIAGNOSIS — M70.70 BURSITIS OF HIP, UNSPECIFIED BURSA, UNSPECIFIED LATERALITY: ICD-10-CM

## 2023-02-24 RX ORDER — BUPIVACAINE HYDROCHLORIDE 5 MG/ML
2 INJECTION, SOLUTION EPIDURAL; INTRACAUDAL
Status: COMPLETED | OUTPATIENT
Start: 2023-02-24 | End: 2023-02-24

## 2023-02-24 RX ORDER — TRIAMCINOLONE ACETONIDE 40 MG/ML
40 INJECTION, SUSPENSION INTRA-ARTICULAR; INTRAMUSCULAR
Status: COMPLETED | OUTPATIENT
Start: 2023-02-24 | End: 2023-02-24

## 2023-02-24 RX ADMIN — TRIAMCINOLONE ACETONIDE 40 MG: 40 INJECTION, SUSPENSION INTRA-ARTICULAR; INTRAMUSCULAR at 10:48

## 2023-02-24 RX ADMIN — BUPIVACAINE HYDROCHLORIDE 2 ML: 5 INJECTION, SOLUTION EPIDURAL; INTRACAUDAL at 10:48

## 2023-02-24 NOTE — LETTER
February 27, 2023     DO [address]    Patient: Emile Sparks   YOB: 1985   Date of Visit: 2/24/2023       Dear Dr Sue Blair: Thank you for referring Corrine Jolly to me for evaluation  Below are my notes for this consultation  If you have questions, please do not hesitate to call me  I look forward to following your patient along with you  Sincerely,        Ginger Anthony DO        CC: No Recipients  Ginger Anthony DO  2/24/2023 12:48 PM  Signed  ORTHOPEDICS NEW CONSULT VISIT    Assessment:    1  Left hip pain    2  Bursitis of hip, unspecified bursa, unspecified laterality    3  Right hip pain       Plan:    Problem List Items Addressed This Visit    None  Visit Diagnoses     Left hip pain    -  Primary    Relevant Orders    XR hips bilateral 2 vw w pelvis if performed    Bursitis of hip, unspecified bursa, unspecified laterality        Relevant Medications    Diclofenac Sodium (VOLTAREN) 1 %    Other Relevant Orders    Ambulatory Referral to Physical Therapy    Right hip pain        Relevant Orders    XR hips bilateral 2 vw w pelvis if performed          Discussion:  - discussed relation to time period of giving birth and previous requirements of venofer possibly related to inflammation, but most likely cause of pain as GT bursitis  - explained IT band and GT bursitis; exercises and stretches given  - can get future injections in 3 months  - follow up in 3 months after PT exercises  - continue to follow with rheum  - discussed hip XR as with evidence of some hip dysplasia and early onset OA, which may be asymptomatic at this time with lack of pain or symptoms about the groin    Procedures:  Large joint arthrocentesis: R greater trochanteric bursa  Universal Protocol:  Consent: Verbal consent obtained    Risks and benefits: risks, benefits and alternatives were discussed  Consent given by: patient  Time out: Immediately prior to procedure a "time out" was called to verify the correct patient, procedure, equipment, support staff and site/side marked as required  Patient understanding: patient states understanding of the procedure being performed  Site marked: the operative site was marked  Supporting Documentation  Indications: pain   Procedure Details  Location: hip - R greater trochanteric bursa  Preparation: Patient was prepped and draped in the usual sterile fashion  Needle size: 22 G  Ultrasound guidance: no  Medications administered: 2 mL bupivacaine (PF) 0 5 %; 40 mg triamcinolone acetonide 40 mg/mL    Patient tolerance: patient tolerated the procedure well with no immediate complications  Dressing:  Sterile dressing applied    Large joint arthrocentesis: L greater trochanteric bursa  Universal Protocol:  Consent: Verbal consent obtained  Risks and benefits: risks, benefits and alternatives were discussed  Consent given by: patient  Time out: Immediately prior to procedure a "time out" was called to verify the correct patient, procedure, equipment, support staff and site/side marked as required  Patient understanding: patient states understanding of the procedure being performed  Site marked: the operative site was marked  Supporting Documentation  Indications: pain   Procedure Details  Location: hip - L greater trochanteric bursa  Preparation: Patient was prepped and draped in the usual sterile fashion  Needle size: 22 G  Ultrasound guidance: no  Medications administered: 2 mL bupivacaine (PF) 0 5 %; 40 mg triamcinolone acetonide 40 mg/mL    Patient tolerance: patient tolerated the procedure well with no immediate complications  Dressing:  Sterile dressing applied        Surgery:  No surgery is recommended at this point, continue with conservative treatment plan as noted  Imaging:  Study type: XRAY bilateral hip  Date: 02/24/23  I have personally reviewed all relevant imaging  No radiologist report was available at this time  My impression is as follows:  evidence of hip dysplasia in bilateral femoral necks, minimal osteophyte formation on the acetabulum    No follow-ups on file  Subjective:      Chief Complaint:  Drake Burt is a 44 y o  female  who presents in consultation by Mayte Carreno DO for bilateral hip pain    Tylenol has helped some with the pain, unable to take NSAIDs     Associated with venofer injections around the time of her last GT injections, after giving birth in 2021    Allergies: Allergies   Allergen Reactions   • Augmentin [Amoxicillin-Pot Clavulanate] GI Intolerance and Vomiting     Category: Adverse Reaction;    • Nsaids      Other reaction(s): gastric sleeve   • Pollen Extract      Other reaction(s): Other (Please comment)  Pt has post nasal drip,sneezing,eyes watery       Medications:  all current active meds have been reviewed  Past Medical History:  Past Medical History:   Diagnosis Date   • Abnormal LFTs (liver function tests)     LAST ASSESSED: 71AWN0667   • Adalimumab (Humira) long-term use     LAST ASSESSED: 26OCT2016   • Affective disorder ([facility] [address] )     LAST ASSESSED: 93VUN4213   • Anxiety    • Arthritis    • Aspiration into airway     LAST ASSESSED: 55BXE7776   • Benign neoplasm of pituitary gland (HCC)     LAST ASSESSED: 38LTJ4677   • Chondromalacia, patella     LAST ASSESSED: 28MON0112   • Common migraine without aura     LAST ASSESSED: 68USI4449   • Depression with anxiety    • Elevated C-reactive protein     LAST ASSESSED: 78WFO6399   • Exertional dyspnea     LAST ASSESSED: 26IQV6546   • GERD (gastroesophageal reflux disease)    • Hemicrania continua     LAST ASSESSED: 24XVG1151   • High protein C activity level     LAST ASSESSED: 55YNV6400   • Hyperprolactinemia (HCC)     LAST ASSESSED: 03AIP8451   • Long-term use of hydroxychloroquine     LAST ASSESSED: 15XLT5225   • Migraine     LAST ASSESSED: 20YHC3527   • Polyarthritis     LAST ASSESSED: 96RBK5469   • Polycystic ovarian disease    • Prediabetes     LAST ASSESSED: 81GIP9697   • Rheumatoid arthritis ([facility] [address] )     LAST ASSESSED: 02ZVY6124   • Seronegative rheumatoid arthritis ([facility] [address] )     LAST ASSESSED: 47FKC7936   • TMJ (dislocation of temporomandibular joint)     APPEARANCE LAST ASSESSED: 41ITL6303   • Trochanteric bursitis of right hip     LAST ASSESSED: 24OSL7403   • Vitamin D deficiency     LAST ASSESSED: 32SSN7950     Past Surgical History:  Past Surgical History:   Procedure Laterality Date   • CHOLECYSTECTOMY     • DENTAL SURGERY     • GASTRECTOMY      SLEEVE RESOLVED: 83WZU2650   • NASAL SEPTUM SURGERY      DEVIATION REPAIR   • NASAL SEPTUM SURGERY     • TONSILLECTOMY     • TONSILLECTOMY     • TOOTH EXTRACTION     • WISDOM TOOTH EXTRACTION     • WISDOM TOOTH EXTRACTION       Family History:  Family History   Problem Relation Age of Onset   • Arthritis Mother    • Psoriasis Mother    • No Known Problems Father    • Lung disease Maternal Grandfather         BLACK   • Diabetes Paternal Grandmother    • Cancer Paternal Grandfather    • Coronary artery disease Family    • Colon cancer Family    • Diabetes Family      Social History:  Social History     Substance and Sexual Activity   Alcohol Use Not Currently    Comment: OCCASIONAL - [facility]     Social History     Substance and Sexual Activity   Drug Use No     Social History     Tobacco Use   Smoking Status Never   Smokeless Tobacco Never     Review of Systems:  Review of Systems  Constitutional: Negative for fatigue, fever or loss of appetite  HENT: Negative  Respiratory: Negative for shortness of breath, dyspnea  Cardiovascular: Negative for chest pain/tightness  Gastrointestinal: Negative for abdominal pain, N/V  Endocrine: Negative for cold/heat intolerance, unexplained weight loss/gain  Genitourinary: Negative for flank pain, dysuria, hematuria  Musculoskeletal:  Positive for arthralgia   Skin: Negative for rash      Neurological:  Negative for numbness or tingling  Psychiatric/Behavioral: Negative for agitation  Objective:  Blood pressure 118/80, pulse 80, height 5' 6" (1 676 m), weight 83 5 kg (184 lb), unknown if currently breastfeeding  Estimated body mass index is 29 7 kg/m² as calculated from the following:    Height as of this encounter: 5' 6" (1 676 m)  Weight as of this encounter: 83 5 kg (184 lb)  Exam:   Constitutional: Oriented to person, place, and time  Appears well-developed and well-nourished  No distress  Head: Normocephalic  Eyes: Conjunctivae are normal  Right eye exhibits no discharge  Left eye exhibits no discharge  No scleral icterus  Cardiovascular: Normal rate  Pulmonary/Chest: Effort normal    Neurological: Alert and oriented to person, place, and time  Skin: Skin is warm and dry  No rash noted  Not diaphoretic  No erythema  With diffuse pallor  Psychiatric: Normal mood and affect   Behavior is normal  Judgment and thought content normal       Focused Exam:  Bilateral hips:   signiificanlty TTP over GT bilaterally, L>R  AROM of bilateral hips normal  Sensation intact  Area warm and well perfused       30 minutes was spent in the coordination of care, reviewing of imaging and with the patient on the date of service    Scribe Attestation    I,:  [facility] Center[facility]ES[facility]S am acting as a scribe while in the presence of the attending physician :       I,:  Nadira Fagan, DO personally performed the services described in this documentation    as scribed in my presence :

## 2023-02-24 NOTE — PROGRESS NOTES
ORTHOPEDICS NEW CONSULT VISIT    Assessment:     1  Left hip pain    2  Bursitis of hip, unspecified bursa, unspecified laterality    3  Right hip pain        Plan:     Problem List Items Addressed This Visit    None  Visit Diagnoses     Left hip pain    -  Primary    Relevant Orders    XR hips bilateral 2 vw w pelvis if performed    Bursitis of hip, unspecified bursa, unspecified laterality        Relevant Medications    Diclofenac Sodium (VOLTAREN) 1 %    Other Relevant Orders    Ambulatory Referral to Physical Therapy    Right hip pain        Relevant Orders    XR hips bilateral 2 vw w pelvis if performed           Discussion:  - discussed relation to time period of giving birth and previous requirements of venofer possibly related to inflammation, but most likely cause of pain as GT bursitis  - explained IT band and GT bursitis; exercises and stretches given  - can get future injections in 3 months  - follow up in 3 months after PT exercises  - continue to follow with rheum  - discussed hip XR as with evidence of some hip dysplasia and early onset OA, which may be asymptomatic at this time with lack of pain or symptoms about the groin    Procedures:  Large joint arthrocentesis: R greater trochanteric bursa  Universal Protocol:  Consent: Verbal consent obtained  Risks and benefits: risks, benefits and alternatives were discussed  Consent given by: patient  Time out: Immediately prior to procedure a "time out" was called to verify the correct patient, procedure, equipment, support staff and site/side marked as required    Patient understanding: patient states understanding of the procedure being performed  Site marked: the operative site was marked  Supporting Documentation  Indications: pain   Procedure Details  Location: hip - R greater trochanteric bursa  Preparation: Patient was prepped and draped in the usual sterile fashion  Needle size: 22 G  Ultrasound guidance: no  Medications administered: 2 mL bupivacaine (PF) 0 5 %; 40 mg triamcinolone acetonide 40 mg/mL    Patient tolerance: patient tolerated the procedure well with no immediate complications  Dressing:  Sterile dressing applied    Large joint arthrocentesis: L greater trochanteric bursa  Universal Protocol:  Consent: Verbal consent obtained  Risks and benefits: risks, benefits and alternatives were discussed  Consent given by: patient  Time out: Immediately prior to procedure a "time out" was called to verify the correct patient, procedure, equipment, support staff and site/side marked as required  Patient understanding: patient states understanding of the procedure being performed  Site marked: the operative site was marked  Supporting Documentation  Indications: pain   Procedure Details  Location: hip - L greater trochanteric bursa  Preparation: Patient was prepped and draped in the usual sterile fashion  Needle size: 22 G  Ultrasound guidance: no  Medications administered: 2 mL bupivacaine (PF) 0 5 %; 40 mg triamcinolone acetonide 40 mg/mL    Patient tolerance: patient tolerated the procedure well with no immediate complications  Dressing:  Sterile dressing applied        Surgery:  No surgery is recommended at this point, continue with conservative treatment plan as noted  Imaging:  Study type: XRAY bilateral hip  Date: 02/24/23  I have personally reviewed all relevant imaging  No radiologist report was available at this time  My impression is as follows:  evidence of hip dysplasia in bilateral femoral necks, minimal osteophyte formation on the acetabulum    No follow-ups on file  Subjective:       Chief Complaint:  Jacqueline Madison is a 44 y o  female  who presents in consultation by Maria Isabel Charles DO for bilateral hip pain    Tylenol has helped some with the pain, unable to take NSAIDs     Associated with venofer injections around the time of her last GT injections, after giving birth in 2021    Allergies:   Allergies   Allergen Reactions   • Augmentin [Amoxicillin-Pot Clavulanate] GI Intolerance and Vomiting     Category: Adverse Reaction;    • Nsaids      Other reaction(s): gastric sleeve   • Pollen Extract      Other reaction(s): Other (Please comment)  Pt has post nasal drip,sneezing,eyes watery       Medications:  all current active meds have been reviewed  Past Medical History:  Past Medical History:   Diagnosis Date   • Abnormal LFTs (liver function tests)     LAST ASSESSED: [id number]   • Adalimumab (Humira) long-term use     LAST ASSESSED: [id number]   • Affective disorder (HCC)     LAST ASSESSED: 61VQB8912   • Anxiety    • Arthritis    • Aspiration into airway     LAST ASSESSED: [id number]   • Benign neoplasm of pituitary gland (HCC)     LAST ASSESSED: 08HSW3042   • Chondromalacia, patella     LAST ASSESSED: [id number]   • Common migraine without aura     LAST ASSESSED: 68CXD5148   • Depression with anxiety    • Elevated C-reactive protein     LAST ASSESSED: 63TUM0271   • Exertional dyspnea     LAST ASSESSED: 28UXR5982   • GERD (gastroesophageal reflux disease)    • Hemicrania continua     LAST ASSESSED: 38WUH7471   • High protein C activity level     LAST ASSESSED: 16COC8365   • Hyperprolactinemia ([facility])     LAST ASSESSED: 09NBB0463   • Long-term use of hydroxychloroquine     LAST ASSESSED: 55QNJ5808   • Migraine     LAST ASSESSED: 25BBZ4517   • Polyarthritis     LAST ASSESSED: 24MIU8372   • Polycystic ovarian disease    • Prediabetes     LAST ASSESSED: 81SSE6964   • Rheumatoid arthritis ([facility])     LAST ASSESSED: 42CLW3135   • Seronegative rheumatoid arthritis ([facility])     LAST ASSESSED: 98NZP8961   • TMJ (dislocation of temporomandibular joint)     APPEARANCE LAST ASSESSED: 67PKO9910   • Trochanteric bursitis of right hip     LAST ASSESSED: 22RVJ5311   • Vitamin D deficiency     LAST ASSESSED: 13RTL5649     Past Surgical History:  Past Surgical History:   Procedure Laterality Date   • CHOLECYSTECTOMY     • DENTAL SURGERY     • GASTRECTOMY SLEEVE RESOLVED: [id number]   • NASAL SEPTUM SURGERY      DEVIATION REPAIR   • NASAL SEPTUM SURGERY     • TONSILLECTOMY     • TONSILLECTOMY     • TOOTH EXTRACTION     • WISDOM TOOTH EXTRACTION     • WISDOM TOOTH EXTRACTION       Family History:  Family History   Problem Relation Age of Onset   • Arthritis Mother    • Psoriasis Mother    • No Known Problems Father    • Lung disease Maternal Grandfather         BLACK   • Diabetes Paternal Grandmother    • Cancer Paternal Grandfather    • Coronary artery disease Family    • Colon cancer Family    • Diabetes Family      Social History:  Social History     Substance and Sexual Activity   Alcohol Use Not Currently    Comment: OCCASIONAL - 1 DRINK/MONTH AS PER [employer]     Social History     Substance and Sexual Activity   Drug Use No     Social History     Tobacco Use   Smoking Status Never   Smokeless Tobacco Never     Review of Systems:  Review of Systems  Constitutional: Negative for fatigue, fever or loss of appetite  HENT: Negative  Respiratory: Negative for shortness of breath, dyspnea  Cardiovascular: Negative for chest pain/tightness  Gastrointestinal: Negative for abdominal pain, N/V  Endocrine: Negative for cold/heat intolerance, unexplained weight loss/gain  Genitourinary: Negative for flank pain, dysuria, hematuria  Musculoskeletal:  Positive for arthralgia   Skin: Negative for rash  Neurological:  Negative for numbness or tingling  Psychiatric/Behavioral: Negative for agitation  Objective:  Blood pressure 118/80, pulse 80, height 5' 6" (1 676 m), weight 83 5 kg (184 lb), unknown if currently breastfeeding  Estimated body mass index is 29 7 kg/m² as calculated from the following:    Height as of this encounter: 5' 6" (1 676 m)  Weight as of this encounter: 83 5 kg (184 lb)  Exam:   Constitutional: Oriented to person, place, and time  Appears well-developed and well-nourished  No distress  Head: Normocephalic     Eyes: Conjunctivae are normal  Right eye exhibits no discharge  Left eye exhibits no discharge  No scleral icterus  Cardiovascular: Normal rate  Pulmonary/Chest: Effort normal    Neurological: Alert and oriented to person, place, and time  Skin: Skin is warm and dry  No rash noted  Not diaphoretic  No erythema  With diffuse pallor  Psychiatric: Normal mood and affect   Behavior is normal  Judgment and thought content normal       Focused Exam:  Bilateral hips:   signiificanlty TTP over GT bilaterally, L>R  AROM of bilateral hips normal  Sensation intact  Area warm and well perfused        30 minutes was spent in the coordination of care, reviewing of imaging and with the patient on the date of service    Scribe Attestation    I,:  [facility] am acting as a scribe while in the presence of the attending physician :       I,:  Vineet Hirsch DO personally performed the services described in this documentation    as scribed in my presence :

## 2023-03-03 ENCOUNTER — OFFICE VISIT (OUTPATIENT)
Dept: FAMILY MEDICINE CLINIC | Facility: CLINIC | Age: 38
End: 2023-03-03

## 2023-03-03 VITALS
HEART RATE: 80 BPM | OXYGEN SATURATION: 99 % | WEIGHT: 185 LBS | TEMPERATURE: 96 F | DIASTOLIC BLOOD PRESSURE: 90 MMHG | HEIGHT: 66 IN | SYSTOLIC BLOOD PRESSURE: 126 MMHG | BODY MASS INDEX: 29.73 KG/M2

## 2023-03-03 DIAGNOSIS — G89.29 CHRONIC LEFT HIP PAIN: Primary | ICD-10-CM

## 2023-03-03 DIAGNOSIS — M85.9 LOW BONE DENSITY FOR AGE: ICD-10-CM

## 2023-03-03 DIAGNOSIS — K91.2 POSTGASTRECTOMY MALABSORPTION: ICD-10-CM

## 2023-03-03 DIAGNOSIS — Z90.3 POSTGASTRECTOMY MALABSORPTION: ICD-10-CM

## 2023-03-03 DIAGNOSIS — M25.552 CHRONIC LEFT HIP PAIN: Primary | ICD-10-CM

## 2023-03-03 NOTE — PROGRESS NOTES
Assessment/Plan:repeat the  Rheumatoid factor, get a dxa scan refer to pain management    Problem List Items Addressed This Visit        Digestive    Postgastrectomy malabsorption    Relevant Orders    DXA bone density spine hip and pelvis   Other Visit Diagnoses     Chronic left hip pain    -  Primary    Relevant Orders    RF Screen w/ Reflex to Titer    KIMBERLY Screen w/ Reflex to Titer/Pattern    C-reactive protein    Ambulatory Referral to Pain Management    Low bone density for age        Relevant Orders    DXA bone density spine hip and pelvis           Diagnoses and all orders for this visit:    Chronic left hip pain  -     RF Screen w/ Reflex to Titer  -     KIMBERLY Screen w/ Reflex to Titer/Pattern; Future  -     C-reactive protein; Future  -     Ambulatory Referral to Pain Management; Future    Low bone density for age  -     DXA bone density spine hip and pelvis; Future    Postgastrectomy malabsorption  -     DXA bone density spine hip and pelvis; Future        No problem-specific Assessment & Plan notes found for this encounter  Subjective:      Patient ID: Santosh Shin is a 44 y o  female      HPI    The following portions of the patient's history were reviewed and updated as appropriate:   She has a past medical history of Abnormal LFTs (liver function tests), Adalimumab (Humira) long-term use, Affective disorder ([address] ), Anxiety, Arthritis, Aspiration into airway, Benign neoplasm of pituitary gland (HCC), Chondromalacia, patella, Common migraine without aura, Depression with anxiety, Elevated C-reactive protein, Exertional dyspnea, GERD (gastroesophageal reflux disease), Hemicrania continua, High protein C activity level, Hyperprolactinemia ([address] ), Long-term use of hydroxychloroquine, Migraine, Polyarthritis, Polycystic ovarian disease, Prediabetes, Rheumatoid arthritis ([address] ), Seronegative rheumatoid arthritis ([address] ), TMJ (dislocation of temporomandibular joint), Trochanteric bursitis of right hip, and Vitamin D deficiency  ,  does not have any pertinent problems on file  ,   has a past surgical history that includes [place] tooth extraction; Tonsillectomy; Nasal septum surgery; [place] tooth extraction; Nasal septum surgery; Tonsillectomy; Dental surgery; Gastrectomy; Tooth extraction; and Cholecystectomy  ,  family history includes Arthritis in her mother; Cancer in her paternal grandfather; Colon cancer in her family; Coronary artery disease in her family; Diabetes in her family and paternal grandmother; Lung disease in her maternal grandfather; No Known Problems in her father; Psoriasis in her mother  ,   reports that she has never smoked  She has never used smokeless tobacco  She reports that she does not currently use alcohol  She reports that she does not use drugs  ,  is allergic to augmentin [amoxicillin-pot clavulanate], nsaids, and pollen extract     Current Outpatient Medications   Medication Sig Dispense Refill   • Clinitest Rapid COVID-19 Test KIT      • clonazePAM (KlonoPIN) 1 mg tablet Take 1 tablet (1 mg total) by mouth 2 (two) times a day 60 tablet 0   • Diclofenac Sodium (VOLTAREN) 1 % Apply 2 g topically 4 (four) times a day 150 g 3   • levonorgestrel-ethinyl estradiol (AVIANE,ALESSE,LESSINA) 0 1-20 MG-MCG per tablet Take 1 tablet by mouth daily     • levonorgestrel-ethinyl estradiol (NORDETTE) 0 15-30 MG-MCG per tablet      • Melatonin 5 MG TABS Take 5 mg by mouth daily     • SUMAtriptan Succinate 6 MG/0 5ML SOAJ Inject 0 5mL at onset of headache and may repeat in 2 hours, MAX 2 inject a day MAX 3 days/week 1 mL 0   • Cholecalciferol (REPLESTA PO) Take 10,000 Units by mouth once a week (Patient not taking: Reported on 1/31/2023)     • famciclovir (FAMVIR) 500 mg tablet Take 3 tablets (1,500 mg total) by mouth 1 (one) time for 1 dose (Patient not taking: Reported on 1/31/2023) 3 tablet 0     No current facility-administered medications for this visit         Review of Systems      Objective:  Vitals: 03/03/23 0724   BP: 126/90   BP Location: Left arm   Patient Position: Sitting   Cuff Size: Standard   Pulse: 80   Temp: (!) 96 °F (35 6 °C)   TempSrc: Temporal   SpO2: 99%   Weight: 83 9 kg (185 lb)   Height: 5' 6" (1 676 m)     Body mass index is 29 86 kg/m²       Physical Exam

## 2023-03-05 ENCOUNTER — LAB (OUTPATIENT)
Dept: LAB | Facility: HOSPITAL | Age: 38
End: 2023-03-05

## 2023-03-05 DIAGNOSIS — G89.29 CHRONIC LEFT HIP PAIN: ICD-10-CM

## 2023-03-05 DIAGNOSIS — Z90.3 H/O GASTRIC SLEEVE: ICD-10-CM

## 2023-03-05 DIAGNOSIS — D51.9 ANEMIA DUE TO VITAMIN B12 DEFICIENCY, UNSPECIFIED B12 DEFICIENCY TYPE: ICD-10-CM

## 2023-03-05 DIAGNOSIS — K91.2 POSTGASTRECTOMY MALABSORPTION: ICD-10-CM

## 2023-03-05 DIAGNOSIS — K95.89 IRON DEFICIENCY ANEMIA FOLLOWING BARIATRIC SURGERY: ICD-10-CM

## 2023-03-05 DIAGNOSIS — M25.552 CHRONIC LEFT HIP PAIN: ICD-10-CM

## 2023-03-05 DIAGNOSIS — Z90.3 POSTGASTRECTOMY MALABSORPTION: ICD-10-CM

## 2023-03-05 DIAGNOSIS — E61.1 LOW SERUM IRON: ICD-10-CM

## 2023-03-05 DIAGNOSIS — D50.8 IRON DEFICIENCY ANEMIA FOLLOWING BARIATRIC SURGERY: ICD-10-CM

## 2023-03-05 LAB
ALBUMIN SERPL BCP-MCNC: 4.4 G/DL (ref 3.5–5)
ALP SERPL-CCNC: 36 U/L (ref 34–104)
ALT SERPL W P-5'-P-CCNC: 9 U/L (ref 7–52)
ANA SER QL IA: NEGATIVE
ANION GAP SERPL CALCULATED.3IONS-SCNC: 10 MMOL/L (ref 4–13)
AST SERPL W P-5'-P-CCNC: 8 U/L (ref 13–39)
BASOPHILS # BLD AUTO: 0.06 THOUSANDS/ÂΜL (ref 0–0.1)
BASOPHILS NFR BLD AUTO: 1 % (ref 0–1)
BILIRUB SERPL-MCNC: 0.95 MG/DL (ref 0.2–1)
BUN SERPL-MCNC: 12 MG/DL (ref 5–25)
CALCIUM SERPL-MCNC: 9.5 MG/DL (ref 8.4–10.2)
CHLORIDE SERPL-SCNC: 103 MMOL/L (ref 96–108)
CO2 SERPL-SCNC: 25 MMOL/L (ref 21–32)
CREAT SERPL-MCNC: 0.82 MG/DL (ref 0.6–1.3)
CRP SERPL QL: 2.2 MG/L
EOSINOPHIL # BLD AUTO: 0.07 THOUSAND/ÂΜL (ref 0–0.61)
EOSINOPHIL NFR BLD AUTO: 1 % (ref 0–6)
ERYTHROCYTE [DISTWIDTH] IN BLOOD BY AUTOMATED COUNT: 14.3 % (ref 11.6–15.1)
FERRITIN SERPL-MCNC: 154 NG/ML (ref 8–388)
GFR SERPL CREATININE-BSD FRML MDRD: 91 ML/MIN/1.73SQ M
GLUCOSE P FAST SERPL-MCNC: 88 MG/DL (ref 65–99)
HCT VFR BLD AUTO: 45 % (ref 34.8–46.1)
HGB BLD-MCNC: 14.7 G/DL (ref 11.5–15.4)
IMM GRANULOCYTES # BLD AUTO: 0.04 THOUSAND/UL (ref 0–0.2)
IMM GRANULOCYTES NFR BLD AUTO: 0 % (ref 0–2)
IRON SATN MFR SERPL: 48 % (ref 15–50)
IRON SERPL-MCNC: 165 UG/DL (ref 50–170)
LYMPHOCYTES # BLD AUTO: 3.93 THOUSANDS/ÂΜL (ref 0.6–4.47)
LYMPHOCYTES NFR BLD AUTO: 33 % (ref 14–44)
MCH RBC QN AUTO: 29.8 PG (ref 26.8–34.3)
MCHC RBC AUTO-ENTMCNC: 32.7 G/DL (ref 31.4–37.4)
MCV RBC AUTO: 91 FL (ref 82–98)
MONOCYTES # BLD AUTO: 0.71 THOUSAND/ÂΜL (ref 0.17–1.22)
MONOCYTES NFR BLD AUTO: 6 % (ref 4–12)
NEUTROPHILS # BLD AUTO: 7.12 THOUSANDS/ÂΜL (ref 1.85–7.62)
NEUTS SEG NFR BLD AUTO: 59 % (ref 43–75)
NRBC BLD AUTO-RTO: 0 /100 WBCS
PLATELET # BLD AUTO: 330 THOUSANDS/UL (ref 149–390)
PMV BLD AUTO: 9.7 FL (ref 8.9–12.7)
POTASSIUM SERPL-SCNC: 3.9 MMOL/L (ref 3.5–5.3)
PROT SERPL-MCNC: 7.6 G/DL (ref 6.4–8.4)
RBC # BLD AUTO: 4.94 MILLION/UL (ref 3.81–5.12)
SODIUM SERPL-SCNC: 138 MMOL/L (ref 135–147)
TIBC SERPL-MCNC: 341 UG/DL (ref 250–450)
VIT B12 SERPL-MCNC: 261 PG/ML (ref 100–900)
WBC # BLD AUTO: 11.93 THOUSAND/UL (ref 4.31–10.16)

## 2023-03-06 LAB — RHEUMATOID FACT SER QL LA: NEGATIVE

## 2023-03-06 NOTE — RESULT ENCOUNTER NOTE
Please call the patient regarding her abnormal result    Mild elevation of the white blood count not sure what that is all about if she has any urinary symptoms we should get a urinalysis her test for lupus was negative C-reactive protein actually was good with without  inflammation

## 2023-03-09 ENCOUNTER — HOSPITAL ENCOUNTER (EMERGENCY)
Facility: HOSPITAL | Age: 38
Discharge: HOME | End: 2023-03-09
Attending: EMERGENCY MEDICINE

## 2023-03-09 ENCOUNTER — APPOINTMENT (EMERGENCY)
Dept: RADIOLOGY | Facility: HOSPITAL | Age: 38
End: 2023-03-09

## 2023-03-09 VITALS
TEMPERATURE: 98.3 F | SYSTOLIC BLOOD PRESSURE: 110 MMHG | OXYGEN SATURATION: 96 % | RESPIRATION RATE: 17 BRPM | HEART RATE: 89 BPM | DIASTOLIC BLOOD PRESSURE: 77 MMHG

## 2023-03-09 DIAGNOSIS — R53.82 CHRONIC FATIGUE: Primary | ICD-10-CM

## 2023-03-09 DIAGNOSIS — D35.2 PITUITARY MICROADENOMA (HCC): ICD-10-CM

## 2023-03-09 DIAGNOSIS — R53.83 FATIGUE: ICD-10-CM

## 2023-03-09 DIAGNOSIS — R53.1 GENERALIZED WEAKNESS: Primary | ICD-10-CM

## 2023-03-09 DIAGNOSIS — R42 LIGHTHEADEDNESS: ICD-10-CM

## 2023-03-09 LAB
ALBUMIN SERPL BCP-MCNC: 4.5 G/DL (ref 3.5–5)
ALP SERPL-CCNC: 60 U/L (ref 34–104)
ALT SERPL W P-5'-P-CCNC: 19 U/L (ref 7–52)
ANION GAP SERPL CALCULATED.3IONS-SCNC: 8 MMOL/L (ref 4–13)
AST SERPL W P-5'-P-CCNC: 17 U/L (ref 13–39)
BASOPHILS # BLD AUTO: 0.06 THOUSANDS/ÂΜL (ref 0–0.1)
BASOPHILS NFR BLD AUTO: 1 % (ref 0–1)
BILIRUB SERPL-MCNC: 0.49 MG/DL (ref 0.2–1)
BILIRUB UR QL STRIP: NEGATIVE
BUN SERPL-MCNC: 18 MG/DL (ref 5–25)
CALCIUM SERPL-MCNC: 9.5 MG/DL (ref 8.4–10.2)
CHLORIDE SERPL-SCNC: 104 MMOL/L (ref 96–108)
CLARITY UR: CLEAR
CO2 SERPL-SCNC: 26 MMOL/L (ref 21–32)
COLOR UR: YELLOW
CREAT SERPL-MCNC: 0.81 MG/DL (ref 0.6–1.3)
EOSINOPHIL # BLD AUTO: 0.05 THOUSAND/ÂΜL (ref 0–0.61)
EOSINOPHIL NFR BLD AUTO: 1 % (ref 0–6)
ERYTHROCYTE [DISTWIDTH] IN BLOOD BY AUTOMATED COUNT: 14.1 % (ref 11.6–15.1)
FLUAV RNA RESP QL NAA+PROBE: NEGATIVE
FLUBV RNA RESP QL NAA+PROBE: NEGATIVE
GFR SERPL CREATININE-BSD FRML MDRD: 92 ML/MIN/1.73SQ M
GLUCOSE SERPL-MCNC: 114 MG/DL (ref 65–140)
GLUCOSE UR STRIP-MCNC: NEGATIVE MG/DL
HCG SERPL QL: NEGATIVE
HCT VFR BLD AUTO: 46.7 % (ref 34.8–46.1)
HGB BLD-MCNC: 15.8 G/DL (ref 11.5–15.4)
HGB UR QL STRIP.AUTO: NEGATIVE
IMM GRANULOCYTES # BLD AUTO: 0.1 THOUSAND/UL (ref 0–0.2)
IMM GRANULOCYTES NFR BLD AUTO: 1 % (ref 0–2)
KETONES UR STRIP-MCNC: NEGATIVE MG/DL
LEUKOCYTE ESTERASE UR QL STRIP: NEGATIVE
LYMPHOCYTES # BLD AUTO: 1.57 THOUSANDS/ÂΜL (ref 0.6–4.47)
LYMPHOCYTES NFR BLD AUTO: 14 % (ref 14–44)
MCH RBC QN AUTO: 30.4 PG (ref 26.8–34.3)
MCHC RBC AUTO-ENTMCNC: 33.8 G/DL (ref 31.4–37.4)
MCV RBC AUTO: 90 FL (ref 82–98)
MONOCYTES # BLD AUTO: 0.96 THOUSAND/ÂΜL (ref 0.17–1.22)
MONOCYTES NFR BLD AUTO: 9 % (ref 4–12)
NEUTROPHILS # BLD AUTO: 8.32 THOUSANDS/ÂΜL (ref 1.85–7.62)
NEUTS SEG NFR BLD AUTO: 74 % (ref 43–75)
NITRITE UR QL STRIP: NEGATIVE
NRBC BLD AUTO-RTO: 0 /100 WBCS
PH UR STRIP.AUTO: 5.5 [PH]
PLATELET # BLD AUTO: 296 THOUSANDS/UL (ref 149–390)
PMV BLD AUTO: 10.1 FL (ref 8.9–12.7)
POTASSIUM SERPL-SCNC: 3.5 MMOL/L (ref 3.5–5.3)
PROT SERPL-MCNC: 7.9 G/DL (ref 6.4–8.4)
PROT UR STRIP-MCNC: NEGATIVE MG/DL
RBC # BLD AUTO: 5.19 MILLION/UL (ref 3.81–5.12)
RSV RNA RESP QL NAA+PROBE: NEGATIVE
SARS-COV-2 RNA RESP QL NAA+PROBE: NEGATIVE
SODIUM SERPL-SCNC: 138 MMOL/L (ref 135–147)
SP GR UR STRIP.AUTO: 1.02 (ref 1–1.03)
TSH SERPL DL<=0.05 MIU/L-ACNC: 0.58 UIU/ML (ref 0.45–4.5)
UROBILINOGEN UR QL STRIP.AUTO: 0.2 E.U./DL
WBC # BLD AUTO: 11.06 THOUSAND/UL (ref 4.31–10.16)

## 2023-03-09 RX ADMIN — SODIUM CHLORIDE 1000 ML: 0.9 INJECTION, SOLUTION INTRAVENOUS at 17:43

## 2023-03-09 NOTE — Clinical Note
Felipe Rogel was seen and treated in our emergency department on 3/9/2023  Diagnosis:     Hakan Hackett  may return to work on return date  She may return on this date: 03/13/2023         If you have any questions or concerns, please don't hesitate to call        Mariela Osuna MD    ______________________________           _______________          _______________  Hospital Representative                              Date                                Time

## 2023-03-10 LAB
ATRIAL RATE: 99 BPM
P AXIS: 25 DEGREES
PR INTERVAL: 136 MS
QRS AXIS: -14 DEGREES
QRSD INTERVAL: 82 MS
QT INTERVAL: 346 MS
QTC INTERVAL: 444 MS
T WAVE AXIS: 18 DEGREES
VENTRICULAR RATE: 99 BPM

## 2023-03-13 DIAGNOSIS — R00.2 PALPITATIONS: Primary | ICD-10-CM

## 2023-03-14 ENCOUNTER — HOSPITAL ENCOUNTER (OUTPATIENT)
Dept: BONE DENSITY | Facility: HOSPITAL | Age: 38
Discharge: HOME | End: 2023-03-14
Attending: FAMILY MEDICINE

## 2023-03-14 VITALS — BODY MASS INDEX: 30.32 KG/M2 | HEIGHT: 65 IN | WEIGHT: 182 LBS

## 2023-03-14 DIAGNOSIS — Z90.3 POSTGASTRECTOMY MALABSORPTION: ICD-10-CM

## 2023-03-14 DIAGNOSIS — M85.9 LOW BONE DENSITY FOR AGE: ICD-10-CM

## 2023-03-14 DIAGNOSIS — K91.2 POSTGASTRECTOMY MALABSORPTION: ICD-10-CM

## 2023-03-14 NOTE — ED PROVIDER NOTES
History  Chief Complaint   Patient presents with   • Weakness - Generalized     Iron deficient gets iron infusions at infusion center  Levels were good Sunday  Got period yesterday now feeling weak, difficulty focusing and dizzy when standing up      Patient is a 25-year-old female with a history of iron deficiency anemia that presents for evaluation of generalized weakness  Patient says that over the past several days she has had worsening generalized weakness and fatigue  She also does endorse lightheadedness when she abruptly stands up that passes after several seconds  There is been no syncope  She denies chest pain dyspnea or abdominal pain at this time  She says that she is getting iron infusions to help with iron deficiency anemia  This has improved her hemoglobin  Otherwise she has been eating and drinking normally  No urinary complaints  Prior to Admission Medications   Prescriptions Last Dose Informant Patient Reported? Taking?    Cholecalciferol (REPLESTA PO)   Yes No   Sig: Take 10,000 Units by mouth once a week   Patient not taking: Reported on 1/31/2023   Clinitest Rapid COVID-19 Test KIT   Yes No   Diclofenac Sodium (VOLTAREN) 1 %   No No   Sig: Apply 2 g topically 4 (four) times a day   Melatonin 5 MG TABS   Yes No   Sig: Take 5 mg by mouth daily   SUMAtriptan Succinate 6 MG/0 5ML SOAJ   No No   Sig: Inject 0 5mL at onset of headache and may repeat in 2 hours, MAX 2 inject a day MAX 3 days/week   clonazePAM (KlonoPIN) 1 mg tablet   No No   Sig: Take 1 tablet (1 mg total) by mouth 2 (two) times a day   famciclovir (FAMVIR) 500 mg tablet   No No   Sig: Take 3 tablets (1,500 mg total) by mouth 1 (one) time for 1 dose   Patient not taking: Reported on 1/31/2023   levonorgestrel-ethinyl estradiol (AVIANE,ALESSE,LESSINA) 0 1-20 MG-MCG per tablet   Yes No   Sig: Take 1 tablet by mouth daily   levonorgestrel-ethinyl estradiol (NORDETTE) 0 15-30 MG-MCG per tablet   Yes No Facility-Administered Medications: None       Past Medical History:   Diagnosis Date   • Abnormal LFTs (liver function tests)     LAST ASSESSED: [id number]   • Adalimumab (Humira) long-term use     LAST ASSESSED: 03VDL3574   • Affective disorder (HCC)     LAST ASSESSED: 85NIK5412   • Anxiety    • Arthritis    • Aspiration into airway     LAST ASSESSED: 05UNY4550   • Benign neoplasm of pituitary gland (HCC)     LAST ASSESSED: 58HJK9285   • Chondromalacia, patella     LAST ASSESSED: 94WYH1074   • Common migraine without aura     LAST ASSESSED: 83YBT7785   • Depression with anxiety    • Elevated C-reactive protein     LAST ASSESSED: 38EWJ2617   • Exertional dyspnea     LAST ASSESSED: 23TED4775   • GERD (gastroesophageal reflux disease)    • Hemicrania continua     LAST ASSESSED: 85LGG0446   • High protein C activity level     LAST ASSESSED: 22BUC3954   • Hyperprolactinemia (HCC)     LAST ASSESSED: 41CPK2680   • Long-term use of hydroxychloroquine     LAST ASSESSED: 96YFC8899   • Migraine     LAST ASSESSED: 39LOW1355   • Polyarthritis     LAST ASSESSED: 76MZA9755   • Polycystic ovarian disease    • Prediabetes     LAST ASSESSED: 45PUL7714   • Rheumatoid arthritis (HCC)     LAST ASSESSED: 69OWL3822   • Seronegative rheumatoid arthritis ([facility])     LAST ASSESSED: 95CGB3787   • TMJ (dislocation of temporomandibular joint)     APPEARANCE LAST ASSESSED: 27QGV4402   • Trochanteric bursitis of right hip     LAST ASSESSED: 87SJA0642   • Vitamin D deficiency     LAST ASSESSED: 91BBO7643       Past Surgical History:   Procedure Laterality Date   • CHOLECYSTECTOMY     • DENTAL SURGERY     • GASTRECTOMY      SLEEVE RESOLVED: 98TXE1430   • NASAL SEPTUM SURGERY      DEVIATION REPAIR   • NASAL SEPTUM SURGERY     • TONSILLECTOMY     • TONSILLECTOMY     • TOOTH EXTRACTION     • WISDOM TOOTH EXTRACTION     • WISDOM TOOTH EXTRACTION         Family History   Problem Relation Age of Onset   • Arthritis Mother    • Psoriasis Mother    • No Known Problems Father    • Lung disease Maternal Grandfather         BLACK   • Diabetes Paternal Grandmother    • Cancer Paternal Grandfather    • Coronary artery disease Family    • Colon cancer Family    • Diabetes Family      I have reviewed and agree with the history as documented  E-Cigarette/Vaping   • E-Cigarette Use Never User      E-Cigarette/Vaping Substances   • Nicotine No    • THC No    • CBD No    • Flavoring No      Social History     Tobacco Use   • Smoking status: Never   • Smokeless tobacco: Never   Vaping Use   • Vaping Use: Never used   Substance Use Topics   • Alcohol use: Not Currently     Comment: OCCASIONAL - 1 DRINK/MONTH AS PER ALLSCRIPTS   • Drug use: No       Review of Systems   Constitutional: Positive for fatigue  Negative for fever  HENT: Negative for sore throat  Respiratory: Negative for shortness of breath  Cardiovascular: Negative for chest pain  Gastrointestinal: Negative for abdominal pain  Genitourinary: Negative for dysuria  Musculoskeletal: Negative for back pain  Skin: Negative for rash  Neurological: Positive for weakness  Negative for light-headedness  Psychiatric/Behavioral: Negative for agitation  All other systems reviewed and are negative  Physical Exam  Physical Exam  Vitals reviewed  Constitutional:       General: She is not in acute distress  Appearance: She is well-developed  HENT:      Head: Normocephalic  Eyes:      Pupils: Pupils are equal, round, and reactive to light  Cardiovascular:      Rate and Rhythm: Normal rate and regular rhythm  Heart sounds: Normal heart sounds  Pulmonary:      Effort: Pulmonary effort is normal       Breath sounds: Normal breath sounds  Abdominal:      General: Bowel sounds are normal  There is no distension  Palpations: Abdomen is soft  Tenderness: There is no abdominal tenderness  There is no guarding  Musculoskeletal:         General: No tenderness or deformity   Normal range of motion  Cervical back: Normal range of motion and neck supple  Skin:     General: Skin is warm and dry  Capillary Refill: Capillary refill takes less than 2 seconds  Neurological:      Mental Status: She is alert and oriented to person, place, and time  Cranial Nerves: No cranial nerve deficit  Sensory: No sensory deficit  Psychiatric:         Behavior: Behavior normal          Thought Content:  Thought content normal          Judgment: Judgment normal          Vital Signs  ED Triage Vitals [03/09/23 1649]   Temperature Pulse Respirations Blood Pressure SpO2   (!) 95 4 °F (35 2 °C) (!) 111 17 110/77 96 %      Temp Source Heart Rate Source Patient Position - Orthostatic VS BP Location FiO2 (%)   Tympanic Monitor Sitting Right arm --      Pain Score       --           Vitals:    03/09/23 1649 03/09/23 1911   BP: 110/77    Pulse: (!) 111 89   Patient Position - Orthostatic VS: Sitting          Visual Acuity      ED Medications  Medications   sodium chloride 0 9 % bolus 1,000 mL (0 mL Intravenous Stopped 3/9/23 1925)       Diagnostic Studies  Results Reviewed     Procedure Component Value Units Date/Time    UA w Reflex to Microscopic w Reflex to Culture [[id number]] Collected: 03/09/23 1849    Lab Status: Final result Specimen: Urine, Clean Catch Updated: 03/09/23 1906     Color, UA Yellow     Clarity, UA Clear     Specific [place], UA 1 020     pH, UA 5 5     Leukocytes, UA Negative     Nitrite, UA Negative     Protein, UA Negative mg/dl      Glucose, UA Negative mg/dl      Ketones, UA Negative mg/dl      Urobilinogen, UA 0 2 E U /dl      Bilirubin, UA Negative     Occult Blood, UA Negative    FLU/RSV/COVID - if FLU/RSV clinically relevant [[id number]]  (Normal) Collected: 03/09/23 1703    Lab Status: Final result Specimen: Nares from Nose Updated: 03/09/23 1748     SARS-CoV-2 Negative     INFLUENZA A PCR Negative     INFLUENZA B PCR Negative     RSV PCR Negative    Narrative:      FOR PEDIATRIC PATIENTS - copy/paste COVID Guidelines URL to browser: [email or web address]    SARS-CoV-2 assay is a Nucleic Acid Amplification assay intended for the  qualitative detection of nucleic acid from SARS-CoV-2 in nasopharyngeal  swabs  Results are for the presumptive identification of SARS-CoV-2 RNA  Positive results are indicative of infection with SARS-CoV-2, the virus  causing COVID-19, but do not rule out bacterial infection or co-infection  with other viruses  Laboratories within the [place] and its  territories are required to report all positive results to the appropriate  public health authorities  Negative results do not preclude SARS-CoV-2  infection and should not be used as the sole basis for treatment or other  patient management decisions  Negative results must be combined with  clinical observations, patient history, and epidemiological information  This test has not been FDA cleared or approved  This test has been authorized by FDA under an Emergency Use Authorization  (EUA)  This test is only authorized for the duration of time the  declaration that circumstances exist justifying the authorization of the  emergency use of an in vitro diagnostic tests for detection of SARS-CoV-2  virus and/or diagnosis of COVID-19 infection under section 564(b)(1) of  the Act, 21 U  S C  587MOC-3(Y)(1), unless the authorization is terminated  or revoked sooner  The test has been validated but independent review by FDA  and CLIA is pending  Test performed using [employer] GeneXpert: This RT-PCR assay targets N2,  a region unique to SARS-CoV-2  A conserved region in the E-gene was chosen  for pan-Sarbecovirus detection which includes SARS-CoV-2  According to CMS-2020-01-R, this platform meets the definition of high-throughput technology      hCG, qualitative pregnancy [[id number]]  (Normal) Collected: 03/09/23 1703    Lab Status: Final result Specimen: Blood from Arm, Left Updated: 03/09/23 1740     Preg, Serum Negative    TSH, 3rd generation with Free T4 reflex [[id number]]  (Normal) Collected: 03/09/23 1703    Lab Status: Final result Specimen: Blood from Arm, Left Updated: 03/09/23 1740     TSH 3RD GENERATON 0 579 uIU/mL     Narrative:      Patients undergoing fluorescein dye angiography may retain small amounts of fluorescein in the body for 48-72 hours post procedure  Samples containing fluorescein can produce falsely depressed TSH values  If the patient had this procedure,a specimen should be resubmitted post fluorescein clearance        Comprehensive metabolic panel [[id number]] Collected: 03/09/23 1703    Lab Status: Final result Specimen: Blood from Arm, Left Updated: 03/09/23 1725     Sodium 138 mmol/L      Potassium 3 5 mmol/L      Chloride 104 mmol/L      CO2 26 mmol/L      ANION GAP 8 mmol/L      BUN 18 mg/dL      Creatinine 0 81 mg/dL      Glucose 114 mg/dL      Calcium 9 5 mg/dL      AST 17 U/L      ALT 19 U/L      Alkaline Phosphatase 60 U/L      Total Protein 7 9 g/dL      Albumin 4 5 g/dL      Total Bilirubin 0 49 mg/dL      eGFR 92 ml/min/1 73sq m     Narrative:      [place] guidelines for Chronic Kidney Disease (CKD):   •  Stage 1 with normal or high GFR (GFR > 90 mL/min/1 73 square meters)  •  Stage 2 Mild CKD (GFR = 60-89 mL/min/1 73 square meters)  •  Stage 3A Moderate CKD (GFR = 45-59 mL/min/1 73 square meters)  •  Stage 3B Moderate CKD (GFR = 30-44 mL/min/1 73 square meters)  •  Stage 4 Severe CKD (GFR = 15-29 mL/min/1 73 square meters)  •  Stage 5 End Stage CKD (GFR <15 mL/min/1 73 square meters)  Note: GFR calculation is accurate only with a steady state creatinine    CBC and differential [[id number]]  (Abnormal) Collected: 03/09/23 1703    Lab Status: Final result Specimen: Blood from Arm, Left Updated: 03/09/23 1709     WBC 11 06 Thousand/uL      RBC 5 19 Million/uL      Hemoglobin 15 8 g/dL Hematocrit 46 7 %      MCV 90 fL      MCH 30 4 pg      MCHC 33 8 g/dL      RDW 14 1 %      MPV 10 1 fL      Platelets 367 Thousands/uL      nRBC 0 /100 WBCs      Neutrophils Relative 74 %      Immat GRANS % 1 %      Lymphocytes Relative 14 %      Monocytes Relative 9 %      Eosinophils Relative 1 %      Basophils Relative 1 %      Neutrophils Absolute 8 32 Thousands/µL      Immature Grans Absolute 0 10 Thousand/uL      Lymphocytes Absolute 1 57 Thousands/µL      Monocytes Absolute 0 96 Thousand/µL      Eosinophils Absolute 0 05 Thousand/µL      Basophils Absolute 0 06 Thousands/µL                  XR chest portable   ED Interpretation by Britt Ornelas MD (03/09 2464)   ED wet read: No acute cardiopulmonary process noted      Final Result by Bridgett Bliss MD (03/10 9822)      No acute cardiopulmonary disease  Findings are stable            Workstation performed: [id number]                    Procedures  ECG 12 Lead Documentation Only    Date/Time: 3/9/2023 1:52 PM  Performed by: Britt Ornelas MD  Authorized by: Britt Ornelas MD     ECG reviewed by me, the ED Provider: yes    Patient location:  ED  Previous ECG:     Previous ECG:  Unavailable    Comparison to cardiac monitor: Yes    Interpretation:     Interpretation: normal    Rate:     ECG rate assessment: normal    Rhythm:     Rhythm: sinus rhythm    Ectopy:     Ectopy: none    QRS:     QRS axis:  Normal    QRS intervals:  Normal  Conduction:     Conduction: normal    ST segments:     ST segments:  Normal  T waves:     T waves: normal               ED Course                                             Medical Decision Making  Patient is a 79-year-old female presents for evaluation of ongoing generalized weakness and fatigue  Vital signs notable for initial tachycardia, patient does endorse that she has had decreased p o  intake recently    Improved after IV fluid hydration, I believe that patient's presentation is likely secondary to some level of dehydration  She otherwise had a broad work-up including a TSH, CBC, CMP, chest x-ray, EKG which were all unremarkable  Patient otherwise without red flags, encourage continued outpatient follow-up and strict return precautions  Amount and/or Complexity of Data Reviewed  Labs: ordered  Radiology: ordered and independent interpretation performed  ECG/medicine tests: ordered and independent interpretation performed  Disposition  Final diagnoses:   Generalized weakness   Fatigue   Lightheadedness     Time reflects when diagnosis was documented in both MDM as applicable and the Disposition within this note     Time User Action Codes Description Comment    3/9/2023  7:18 PM Sabas Repress Add [R53 1] Generalized weakness     3/9/2023  7:18 PM Sabas Repress Add [R53 83] Fatigue     3/9/2023  7:18 PM Elise Layton, [address] [[id number]] [address] [facility]       ED Disposition     ED Disposition   Discharge    Condition   Stable    Date/Time   u Mar 9, 2023  7:18 PM    Comment   Maximino Brady Hibbler discharge to home/self care                 Follow-up Information     Follow up With Specialties Details Why [place] [address] Emergency Department Emergency Medicine  If symptoms worsen [place] [id number]  [address] [place] [address] Emergency Department[address], [address]          Discharge Medication List as of 3/9/2023  7:19 PM      CONTINUE these medications which have NOT CHANGED    Details   Clinitest Rapid COVID-19 Test KIT Starting Wed 11/30/2022, Historical Med      clonazePAM (KlonoPIN) 1 mg tablet Take 1 tablet (1 mg total) by mouth 2 (two) times a day, Starting Thu 2/23/2023, Normal      Diclofenac Sodium (VOLTAREN) 1 % Apply 2 g topically 4 (four) times a day, Starting Fri 2/24/2023, Normal      levonorgestrel-ethinyl estradiol (AVIANE,ALESSE,LESSINA) 0 1-20 MG-MCG per tablet Take 1 tablet by mouth daily, Historical Med      levonorgestrel-ethinyl estradiol (NORDETTE) 0 15-30 MG-MCG per tablet Starting Sat 7/30/2022, Historical Med      Melatonin 5 MG TABS Take 5 mg by mouth daily, Historical Med      SUMAtriptan Succinate 6 MG/0 5ML SOAJ Inject 0 5mL at onset of headache and may repeat in 2 hours, MAX 2 inject a day MAX 3 days/week, Normal      Cholecalciferol (REPLESTA PO) Take 10,000 Units by mouth once a week, Historical Med      famciclovir (FAMVIR) 500 mg tablet Take 3 tablets (1,500 mg total) by mouth 1 (one) time for 1 dose, Starting Sun 10/30/2022, Normal             No discharge procedures on file      PDMP Review       Value Time User    PDMP Reviewed  Yes 2/23/2023  7:05 AM Janeth Fontenot DO          ED Provider  Electronically Signed by           Eitan Vaz MD  03/14/23 3164

## 2023-03-20 DIAGNOSIS — Z13.9 SCREENING DUE: Primary | ICD-10-CM

## 2023-03-24 DIAGNOSIS — F41.9 ANXIETY: ICD-10-CM

## 2023-03-24 RX ORDER — CLONAZEPAM 1 MG/1
1 TABLET ORAL 2 TIMES DAILY
Qty: 60 TABLET | Refills: 0 | Status: SHIPPED | OUTPATIENT
Start: 2023-03-24

## 2023-03-30 DIAGNOSIS — I51.7 CARDIOMEGALY: ICD-10-CM

## 2023-03-30 DIAGNOSIS — R00.2 PALPITATIONS: ICD-10-CM

## 2023-03-31 ENCOUNTER — OFFICE VISIT (OUTPATIENT)
Dept: URGENT CARE | Facility: MEDICAL CENTER | Age: 38
End: 2023-03-31

## 2023-03-31 VITALS
DIASTOLIC BLOOD PRESSURE: 80 MMHG | TEMPERATURE: 98 F | HEART RATE: 119 BPM | HEIGHT: 65 IN | RESPIRATION RATE: 20 BRPM | OXYGEN SATURATION: 99 % | SYSTOLIC BLOOD PRESSURE: 110 MMHG | BODY MASS INDEX: 30.32 KG/M2 | WEIGHT: 182 LBS

## 2023-03-31 DIAGNOSIS — J02.0 STREP PHARYNGITIS: Primary | ICD-10-CM

## 2023-03-31 DIAGNOSIS — R05.9 COUGH, UNSPECIFIED TYPE: ICD-10-CM

## 2023-03-31 DIAGNOSIS — J02.9 SORE THROAT: ICD-10-CM

## 2023-03-31 LAB — S PYO AG THROAT QL: POSITIVE

## 2023-03-31 RX ORDER — CEPHALEXIN 500 MG/1
500 CAPSULE ORAL EVERY 12 HOURS SCHEDULED
Qty: 28 CAPSULE | Refills: 0 | Status: SHIPPED | OUTPATIENT
Start: 2023-03-31 | End: 2023-04-10

## 2023-03-31 RX ORDER — ALBUTEROL SULFATE 90 UG/1
2 AEROSOL, METERED RESPIRATORY (INHALATION) EVERY 4 HOURS PRN
Qty: 18 G | Refills: 0 | Status: SHIPPED | OUTPATIENT
Start: 2023-03-31 | End: 2023-04-30

## 2023-03-31 NOTE — PROGRESS NOTES
[facility]        NAME: Nitish Richardson is a 44 y o  female  : 1985    MRN: [id number]  DATE: 2023  TIME: 10:35 AM    Assessment and Plan   Strep pharyngitis [J02 0]  1  Strep pharyngitis  cephalexin (KEFLEX) 500 mg capsule      2  Cough, unspecified type  albuterol (PROVENTIL HFA,VENTOLIN HFA) 90 mcg/act inhaler      3  Sore throat  POCT rapid strepA            Patient Instructions       Follow up with PCP in 3-5 days  Proceed to  ER if symptoms worsen  Chief Complaint     Chief Complaint   Patient presents with   • Sore Throat     Sore throat, woke up with pain this morning, white spots, body aches, ongoing cough and had chest xray which was negative, painful to swallow         History of Present Illness       Presents with a sore throat which started this morning  Her son was diagnosed with strep pharyngitis 2 weeks ago  She also has been having a dry hacking cough which becomes very cyclical for the past weeks  Chest x-ray negative  Denies fever or chills nausea vomiting diarrhea  Review of Systems   Review of Systems   Constitutional: Negative for fever  HENT: Positive for sore throat  Negative for congestion and rhinorrhea  Respiratory: Positive for cough  Gastrointestinal: Negative for nausea and vomiting  Musculoskeletal: Positive for myalgias  Neurological: Positive for headaches           Current Medications       Current Outpatient Medications:   •  albuterol (PROVENTIL HFA,VENTOLIN HFA) 90 mcg/act inhaler, Inhale 2 puffs every 4 (four) hours as needed for wheezing or shortness of breath, Disp: 18 g, Rfl: 0  •  cephalexin (KEFLEX) 500 mg capsule, Take 1 capsule (500 mg total) by mouth every 12 (twelve) hours for 10 days, Disp: 28 capsule, Rfl: 0  •  clonazePAM (KlonoPIN) 1 mg tablet, Take 1 tablet (1 mg total) by mouth 2 (two) times a day, Disp: 60 tablet, Rfl: 0  •  levonorgestrel-ethinyl estradiol (AVIANE,ALESSE,LESSINA) 0 1-20 MG-MCG per tablet, Take 1 tablet by mouth daily, Disp: , Rfl:   •  Melatonin 5 MG TABS, Take 5 mg by mouth daily, Disp: , Rfl:   •  SUMAtriptan Succinate 6 MG/0 5ML SOAJ, Inject 0 5mL at onset of headache and may repeat in 2 hours, MAX 2 inject a day MAX 3 days/week, Disp: 1 mL, Rfl: 0  •  Cholecalciferol (REPLESTA PO), Take 10,000 Units by mouth once a week (Patient not taking: Reported on 1/31/2023), Disp: , Rfl:   •  Clinitest Rapid COVID-19 Test KIT, , Disp: , Rfl:   •  Diclofenac Sodium (VOLTAREN) 1 %, Apply 2 g topically 4 (four) times a day, Disp: 150 g, Rfl: 3  •  famciclovir (FAMVIR) 500 mg tablet, Take 3 tablets (1,500 mg total) by mouth 1 (one) time for 1 dose (Patient not taking: Reported on 1/31/2023), Disp: 3 tablet, Rfl: 0  •  levonorgestrel-ethinyl estradiol (NORDETTE) 0 15-30 MG-MCG per tablet, , Disp: , Rfl:     Current Allergies     Allergies as of 03/31/2023 - Reviewed 03/31/2023   Allergen Reaction Noted   • Augmentin [amoxicillin-pot clavulanate] GI Intolerance and Vomiting 05/28/2015   • Nsaids  04/17/2017   • Pollen extract  05/12/2004            The following portions of the patient's history were reviewed and updated as appropriate: allergies, current medications, past family history, past medical history, past social history, past surgical history and problem list      Past Medical History:   Diagnosis Date   • Abnormal LFTs (liver function tests)     LAST ASSESSED: 49SPS3122   • Adalimumab (Humira) long-term use     LAST ASSESSED: 26OCT2016   • Affective disorder ([facility] [address] )     LAST ASSESSED: 59NHA3025   • Anxiety    • Arthritis    • Aspiration into airway     LAST ASSESSED: 28TGA4482   • Benign neoplasm of pituitary gland (HCC)     LAST ASSESSED: 00DUC8681   • Chondromalacia, patella     LAST ASSESSED: 20BAH3262   • Common migraine without aura     LAST ASSESSED: 29ZOR5741   • Depression with anxiety    • Elevated C-reactive protein     LAST ASSESSED: 37HUW6921   • Exertional dyspnea     LAST ASSESSED: 92ABC8155   • "GERD (gastroesophageal reflux disease)    • Hemicrania continua     LAST ASSESSED: 59BCV3494   • High protein C activity level     LAST ASSESSED: 66JKA7488   • Hyperprolactinemia (HCC)     LAST ASSESSED: 81BCN7442   • Long-term use of hydroxychloroquine     LAST ASSESSED: 53PFQ8016   • Migraine     LAST ASSESSED: 52QCN6354   • Polyarthritis     LAST ASSESSED: 24HZG3154   • Polycystic ovarian disease    • Prediabetes     LAST ASSESSED: 35TBN7203   • Rheumatoid arthritis ([facility] [address] )     LAST ASSESSED: 12CAX1109   • Seronegative rheumatoid arthritis ([facility] [address] )     LAST ASSESSED: 32OKH7711   • TMJ (dislocation of temporomandibular joint)     APPEARANCE LAST ASSESSED: 04BZH3022   • Trochanteric bursitis of right hip     LAST ASSESSED: 15FNF4781   • Vitamin D deficiency     LAST ASSESSED: 25MCA2477       Past Surgical History:   Procedure Laterality Date   • CHOLECYSTECTOMY     • DENTAL SURGERY     • GASTRECTOMY      SLEEVE RESOLVED: 72GDZ8445   • NASAL SEPTUM SURGERY      DEVIATION REPAIR   • NASAL SEPTUM SURGERY     • TONSILLECTOMY     • TONSILLECTOMY     • TOOTH EXTRACTION     • WISDOM TOOTH EXTRACTION     • WISDOM TOOTH EXTRACTION         Family History   Problem Relation Age of Onset   • Arthritis Mother    • Psoriasis Mother    • No Known Problems Father    • Lung disease Maternal Grandfather         BLACK   • Diabetes Paternal Grandmother    • Cancer Paternal Grandfather    • Coronary artery disease Family    • Colon cancer Family    • Diabetes Family          Medications have been verified  Objective   /80   Pulse (!) 119   Temp 98 °F (36 7 °C)   Resp 20   Ht 5' 5\" (1 651 m)   Wt 82 6 kg (182 lb)   LMP 03/08/2023   SpO2 99%   BMI 30 29 kg/m²   Patient's last menstrual period was 03/08/2023  Physical Exam     Physical Exam  Vitals and nursing note reviewed  Constitutional:       Appearance: She is well-developed  HENT:      Head: Normocephalic and atraumatic        Right Ear: Tympanic membrane " normal       Left Ear: Tympanic membrane normal       Mouth/Throat:      Mouth: Mucous membranes are moist       Pharynx: Posterior oropharyngeal erythema (Erythema of the soft palate) present  Eyes:      Conjunctiva/sclera: Conjunctivae normal    Cardiovascular:      Rate and Rhythm: Normal rate and regular rhythm  Heart sounds: Normal heart sounds  Pulmonary:      Effort: Pulmonary effort is normal       Breath sounds: Normal breath sounds  Musculoskeletal:      Cervical back: Neck supple  Lymphadenopathy:      Cervical: No cervical adenopathy  Skin:     General: Skin is warm  Neurological:      Mental Status: She is alert

## 2023-04-24 DIAGNOSIS — F41.9 ANXIETY: ICD-10-CM

## 2023-04-24 RX ORDER — CLONAZEPAM 1 MG/1
1 TABLET ORAL 2 TIMES DAILY
Qty: 60 TABLET | Refills: 0 | Status: SHIPPED | OUTPATIENT
Start: 2023-04-24

## 2023-05-23 DIAGNOSIS — F41.9 ANXIETY: ICD-10-CM

## 2023-05-23 DIAGNOSIS — Z90.3 POSTGASTRECTOMY MALABSORPTION: ICD-10-CM

## 2023-05-23 DIAGNOSIS — Z90.3 H/O GASTRIC SLEEVE: ICD-10-CM

## 2023-05-23 DIAGNOSIS — E61.1 LOW SERUM IRON: Primary | ICD-10-CM

## 2023-05-23 DIAGNOSIS — D51.9 ANEMIA DUE TO VITAMIN B12 DEFICIENCY, UNSPECIFIED B12 DEFICIENCY TYPE: ICD-10-CM

## 2023-05-23 DIAGNOSIS — K91.2 POSTGASTRECTOMY MALABSORPTION: ICD-10-CM

## 2023-05-23 RX ORDER — CLONAZEPAM 1 MG/1
1 TABLET ORAL 2 TIMES DAILY
Qty: 60 TABLET | Refills: 0 | Status: SHIPPED | OUTPATIENT
Start: 2023-05-23

## 2023-05-25 ENCOUNTER — LAB (OUTPATIENT)
Dept: LAB | Facility: HOSPITAL | Age: 38
End: 2023-05-25
Attending: FAMILY MEDICINE

## 2023-05-25 DIAGNOSIS — Z90.3 H/O GASTRIC SLEEVE: ICD-10-CM

## 2023-05-25 DIAGNOSIS — Z90.3 POSTGASTRECTOMY MALABSORPTION: ICD-10-CM

## 2023-05-25 DIAGNOSIS — D51.9 ANEMIA DUE TO VITAMIN B12 DEFICIENCY, UNSPECIFIED B12 DEFICIENCY TYPE: ICD-10-CM

## 2023-05-25 DIAGNOSIS — E61.1 LOW SERUM IRON: ICD-10-CM

## 2023-05-25 DIAGNOSIS — K91.2 POSTGASTRECTOMY MALABSORPTION: ICD-10-CM

## 2023-05-25 DIAGNOSIS — Z13.9 SCREENING DUE: ICD-10-CM

## 2023-05-25 LAB
ALBUMIN SERPL BCP-MCNC: 4.2 G/DL (ref 3.5–5)
ALP SERPL-CCNC: 44 U/L (ref 34–104)
ALT SERPL W P-5'-P-CCNC: 11 U/L (ref 7–52)
ANION GAP SERPL CALCULATED.3IONS-SCNC: 7 MMOL/L (ref 4–13)
AST SERPL W P-5'-P-CCNC: 11 U/L (ref 13–39)
BASOPHILS # BLD AUTO: 0.04 THOUSANDS/ÂΜL (ref 0–0.1)
BASOPHILS NFR BLD AUTO: 1 % (ref 0–1)
BILIRUB SERPL-MCNC: 0.56 MG/DL (ref 0.2–1)
BUN SERPL-MCNC: 9 MG/DL (ref 5–25)
CALCIUM SERPL-MCNC: 9.4 MG/DL (ref 8.4–10.2)
CHLORIDE SERPL-SCNC: 107 MMOL/L (ref 96–108)
CO2 SERPL-SCNC: 26 MMOL/L (ref 21–32)
CREAT SERPL-MCNC: 0.83 MG/DL (ref 0.6–1.3)
EOSINOPHIL # BLD AUTO: 0.19 THOUSAND/ÂΜL (ref 0–0.61)
EOSINOPHIL NFR BLD AUTO: 2 % (ref 0–6)
ERYTHROCYTE [DISTWIDTH] IN BLOOD BY AUTOMATED COUNT: 12.7 % (ref 11.6–15.1)
EST. AVERAGE GLUCOSE BLD GHB EST-MCNC: 97 MG/DL
FERRITIN SERPL-MCNC: 37 NG/ML (ref 11–307)
GFR SERPL CREATININE-BSD FRML MDRD: 89 ML/MIN/1.73SQ M
GLUCOSE P FAST SERPL-MCNC: 80 MG/DL (ref 65–99)
HBA1C MFR BLD: 5 %
HCT VFR BLD AUTO: 37.8 % (ref 34.8–46.1)
HGB BLD-MCNC: 12.7 G/DL (ref 11.5–15.4)
IMM GRANULOCYTES # BLD AUTO: 0.03 THOUSAND/UL (ref 0–0.2)
IMM GRANULOCYTES NFR BLD AUTO: 0 % (ref 0–2)
IRON SATN MFR SERPL: 34 % (ref 15–50)
IRON SERPL-MCNC: 111 UG/DL (ref 50–170)
LYMPHOCYTES # BLD AUTO: 3.04 THOUSANDS/ÂΜL (ref 0.6–4.47)
LYMPHOCYTES NFR BLD AUTO: 37 % (ref 14–44)
MCH RBC QN AUTO: 31.3 PG (ref 26.8–34.3)
MCHC RBC AUTO-ENTMCNC: 33.6 G/DL (ref 31.4–37.4)
MCV RBC AUTO: 93 FL (ref 82–98)
MONOCYTES # BLD AUTO: 0.48 THOUSAND/ÂΜL (ref 0.17–1.22)
MONOCYTES NFR BLD AUTO: 6 % (ref 4–12)
NEUTROPHILS # BLD AUTO: 4.43 THOUSANDS/ÂΜL (ref 1.85–7.62)
NEUTS SEG NFR BLD AUTO: 54 % (ref 43–75)
NRBC BLD AUTO-RTO: 0 /100 WBCS
PLATELET # BLD AUTO: 246 THOUSANDS/UL (ref 149–390)
PMV BLD AUTO: 10.4 FL (ref 8.9–12.7)
POTASSIUM SERPL-SCNC: 3.9 MMOL/L (ref 3.5–5.3)
PROT SERPL-MCNC: 7.3 G/DL (ref 6.4–8.4)
RBC # BLD AUTO: 4.06 MILLION/UL (ref 3.81–5.12)
SODIUM SERPL-SCNC: 140 MMOL/L (ref 135–147)
TIBC SERPL-MCNC: 326 UG/DL (ref 250–450)
VIT B12 SERPL-MCNC: 132 PG/ML (ref 180–914)
WBC # BLD AUTO: 8.21 THOUSAND/UL (ref 4.31–10.16)

## 2023-05-26 DIAGNOSIS — D51.3 OTHER DIETARY VITAMIN B12 DEFICIENCY ANEMIA: ICD-10-CM

## 2023-05-26 DIAGNOSIS — K91.2 POSTGASTRECTOMY MALABSORPTION: Primary | ICD-10-CM

## 2023-05-26 DIAGNOSIS — Z90.3 POSTGASTRECTOMY MALABSORPTION: Primary | ICD-10-CM

## 2023-05-26 LAB
THYROGLOB AB SERPL-ACNC: <1 IU/ML (ref 0–0.9)
THYROPEROXIDASE AB SERPL-ACNC: <9 IU/ML (ref 0–34)

## 2023-06-05 ENCOUNTER — TELEPHONE (OUTPATIENT)
Dept: HEMATOLOGY ONCOLOGY | Facility: CLINIC | Age: 38
End: 2023-06-05

## 2023-06-05 DIAGNOSIS — D50.8 IRON DEFICIENCY ANEMIA FOLLOWING BARIATRIC SURGERY: ICD-10-CM

## 2023-06-05 DIAGNOSIS — K91.2 POSTGASTRECTOMY MALABSORPTION: Primary | ICD-10-CM

## 2023-06-05 DIAGNOSIS — Z90.3 POSTGASTRECTOMY MALABSORPTION: Primary | ICD-10-CM

## 2023-06-05 DIAGNOSIS — K95.89 IRON DEFICIENCY ANEMIA FOLLOWING BARIATRIC SURGERY: ICD-10-CM

## 2023-06-05 DIAGNOSIS — D51.9 ANEMIA DUE TO VITAMIN B12 DEFICIENCY, UNSPECIFIED B12 DEFICIENCY TYPE: ICD-10-CM

## 2023-06-05 DIAGNOSIS — Z90.3 H/O GASTRIC SLEEVE: ICD-10-CM

## 2023-06-05 RX ORDER — CYANOCOBALAMIN 1000 UG/ML
1000 INJECTION, SOLUTION INTRAMUSCULAR; SUBCUTANEOUS ONCE
OUTPATIENT
Start: 2023-06-16 | End: 2023-06-14

## 2023-06-05 RX ORDER — SODIUM CHLORIDE 9 MG/ML
20 INJECTION, SOLUTION INTRAVENOUS ONCE
OUTPATIENT
Start: 2023-06-16

## 2023-06-05 NOTE — TELEPHONE ENCOUNTER
----- Message from [employer] sent at 6/5/2023  1:08 PM EDT -----  Please call to schedule patient for iron and B 12 injections       [facility]

## 2023-06-16 ENCOUNTER — HOSPITAL ENCOUNTER (OUTPATIENT)
Dept: INFUSION CENTER | Facility: HOSPITAL | Age: 38
End: 2023-06-16
Attending: INTERNAL MEDICINE

## 2023-06-20 ENCOUNTER — OFFICE VISIT (OUTPATIENT)
Dept: URGENT CARE | Facility: MEDICAL CENTER | Age: 38
End: 2023-06-20
Payer: COMMERCIAL

## 2023-06-20 VITALS
HEART RATE: 105 BPM | DIASTOLIC BLOOD PRESSURE: 60 MMHG | TEMPERATURE: 98 F | RESPIRATION RATE: 20 BRPM | SYSTOLIC BLOOD PRESSURE: 100 MMHG | HEIGHT: 65 IN | BODY MASS INDEX: 30.32 KG/M2 | WEIGHT: 182 LBS | OXYGEN SATURATION: 98 %

## 2023-06-20 DIAGNOSIS — J02.9 SORE THROAT: Primary | ICD-10-CM

## 2023-06-20 LAB — S PYO AG THROAT QL: NEGATIVE

## 2023-06-20 PROCEDURE — 87070 CULTURE OTHR SPECIMN AEROBIC: CPT | Performed by: PHYSICIAN ASSISTANT

## 2023-06-20 PROCEDURE — 99212 OFFICE O/P EST SF 10 MIN: CPT | Performed by: PHYSICIAN ASSISTANT

## 2023-06-20 PROCEDURE — S9088 SERVICES PROVIDED IN URGENT: HCPCS | Performed by: PHYSICIAN ASSISTANT

## 2023-06-20 PROCEDURE — 87147 CULTURE TYPE IMMUNOLOGIC: CPT | Performed by: PHYSICIAN ASSISTANT

## 2023-06-20 NOTE — PROGRESS NOTES
[address] [facility]        NAME: Clara Antoine is a 44 y o  female  : 1985    MRN: [id number]  DATE: 2023  TIME: 9:19 AM    Assessment and Plan   Sore throat [J02 9]  1  Sore throat  POCT rapid strepA    Throat culture            Patient Instructions       Follow up with PCP in 3-5 days  Proceed to  ER if symptoms worsen  Chief Complaint     Chief Complaint   Patient presents with   • Sore Throat     Pt  C/o sore throat that started last Wednesday, pt  Son had strep throat recently, felt feverish, denies headache or body aches          History of Present Illness       Patient presents with a 6-day history of sore throat  Feverish later today did not take temperature  Daughter has hand-foot-and-mouth  Review of Systems   Review of Systems   Constitutional: Negative for chills and fever  HENT: Positive for sore throat  Negative for congestion and rhinorrhea  Respiratory: Negative for cough  Skin: Negative for rash           Current Medications       Current Outpatient Medications:   •  clonazePAM (KlonoPIN) 1 mg tablet, Take 1 tablet (1 mg total) by mouth 2 (two) times a day, Disp: 60 tablet, Rfl: 0  •  cyanocobalamin (VITAMIN B-12) 1000 MCG tablet, Take 1 tablet (1,000 mcg total) by mouth daily, Disp: 30 tablet, Rfl: 3  •  Diclofenac Sodium (VOLTAREN) 1 %, Apply 2 g topically 4 (four) times a day, Disp: 150 g, Rfl: 3  •  Levonorgestrel (MIRENA) 20 MCG/DAY IUD, 1 each by Intrauterine route once, Disp: , Rfl:   •  Melatonin 5 MG TABS, Take 5 mg by mouth daily, Disp: , Rfl:   •  SUMAtriptan Succinate 6 MG/0 5ML SOAJ, Inject 0 5mL at onset of headache and may repeat in 2 hours, MAX 2 inject a day MAX 3 days/week, Disp: 1 mL, Rfl: 0  •  Cholecalciferol (REPLESTA PO), Take 10,000 Units by mouth once a week (Patient not taking: Reported on 2023), Disp: , Rfl:   •  Clinitest Rapid COVID-19 Test KIT, , Disp: , Rfl:   •  famciclovir (FAMVIR) 500 mg tablet, Take 3 tablets (1,500 mg total) by mouth 1 (one) time for 1 dose (Patient not taking: Reported on 1/31/2023), Disp: 3 tablet, Rfl: 0  •  levonorgestrel-ethinyl estradiol (AVIANE,ALESSE,LESSINA) 0 1-20 MG-MCG per tablet, Take 1 tablet by mouth daily (Patient not taking: Reported on 6/20/2023), Disp: , Rfl:   •  levonorgestrel-ethinyl estradiol (NORDETTE) 0 15-30 MG-MCG per tablet, , Disp: , Rfl:     Current Allergies     Allergies as of 06/20/2023 - Reviewed 06/20/2023   Allergen Reaction Noted   • Augmentin [amoxicillin-pot clavulanate] GI Intolerance and Vomiting 05/28/2015   • Nsaids  04/17/2017   • Pollen extract  05/12/2004            The following portions of the patient's history were reviewed and updated as appropriate: allergies, current medications, past family history, past medical history, past social history, past surgical history and problem list      Past Medical History:   Diagnosis Date   • Abnormal LFTs (liver function tests)     LAST ASSESSED: [id number]   • Adalimumab (Humira) long-term use     LAST ASSESSED: 26OCT2016   • Affective disorder ([facility] [address] )     LAST ASSESSED: [id number]   • Anxiety    • Arthritis    • Aspiration into airway     LAST ASSESSED: 09XEF3081   • Benign neoplasm of pituitary gland (HCC)     LAST ASSESSED: 58SQU0156   • Chondromalacia, patella     LAST ASSESSED: 18WHH5213   • Common migraine without aura     LAST ASSESSED: 25XQT8129   • Depression with anxiety    • Elevated C-reactive protein     LAST ASSESSED: 28JZQ3698   • Exertional dyspnea     LAST ASSESSED: 58ATR3358   • GERD (gastroesophageal reflux disease)    • Hemicrania continua     LAST ASSESSED: 17VPQ2546   • High protein C activity level     LAST ASSESSED: 23WVG4061   • Hyperprolactinemia (HCC)     LAST ASSESSED: 66EVR5468   • Long-term use of hydroxychloroquine     LAST ASSESSED: 84NXL8251   • Migraine     LAST ASSESSED: 85UVO0809   • Polyarthritis     LAST ASSESSED: 00WQU3861   • Polycystic ovarian disease    • Prediabetes     LAST ASSESSED: "67ZDV4418   • Rheumatoid arthritis ([address] )     LAST ASSESSED: 30CJC1287   • Seronegative rheumatoid arthritis (HCC)     LAST ASSESSED: 34QTZ1707   • TMJ (dislocation of temporomandibular joint)     APPEARANCE LAST ASSESSED: 18HKW6664   • Trochanteric bursitis of right hip     LAST ASSESSED: 98KKA5101   • Vitamin D deficiency     LAST ASSESSED: 00MQE2135       Past Surgical History:   Procedure Laterality Date   • CHOLECYSTECTOMY     • DENTAL SURGERY     • GASTRECTOMY      SLEEVE RESOLVED: [id number]   • NASAL SEPTUM SURGERY      DEVIATION REPAIR   • NASAL SEPTUM SURGERY     • TONSILLECTOMY     • TONSILLECTOMY     • TOOTH EXTRACTION     • WISDOM TOOTH EXTRACTION     • WISDOM TOOTH EXTRACTION         Family History   Problem Relation Age of Onset   • Arthritis Mother    • Psoriasis Mother    • No Known Problems Father    • Lung disease Maternal Grandfather         BLACK   • Diabetes Paternal Grandmother    • Cancer Paternal Grandfather    • Coronary artery disease Family    • Colon cancer Family    • Diabetes Family          Medications have been verified  Objective   /60   Pulse 105   Temp 98 °F (36 7 °C)   Resp 20   Ht 5' 5\" (1 651 m)   Wt 82 6 kg (182 lb)   SpO2 98%   BMI 30 29 kg/m²   No LMP recorded  Patient has had an implant  Physical Exam     Physical Exam  Vitals and nursing note reviewed  Constitutional:       Appearance: She is well-developed  HENT:      Head: Normocephalic and atraumatic  Right Ear: Tympanic membrane normal       Left Ear: Tympanic membrane normal       Mouth/Throat:      Mouth: Mucous membranes are moist       Pharynx: Posterior oropharyngeal erythema (mild) present  Tonsils: No tonsillar exudate  Cardiovascular:      Rate and Rhythm: Normal rate and regular rhythm  Heart sounds: Normal heart sounds  Pulmonary:      Effort: Pulmonary effort is normal       Breath sounds: Normal breath sounds  Skin:     General: Skin is warm        Findings: " No rash  Neurological:      Mental Status: She is alert

## 2023-06-21 DIAGNOSIS — K91.2 POSTGASTRECTOMY MALABSORPTION: Primary | ICD-10-CM

## 2023-06-21 DIAGNOSIS — K95.89 IRON DEFICIENCY ANEMIA FOLLOWING BARIATRIC SURGERY: ICD-10-CM

## 2023-06-21 DIAGNOSIS — Z90.3 POSTGASTRECTOMY MALABSORPTION: Primary | ICD-10-CM

## 2023-06-21 DIAGNOSIS — D51.9 ANEMIA DUE TO VITAMIN B12 DEFICIENCY, UNSPECIFIED B12 DEFICIENCY TYPE: ICD-10-CM

## 2023-06-21 DIAGNOSIS — D50.8 IRON DEFICIENCY ANEMIA FOLLOWING BARIATRIC SURGERY: ICD-10-CM

## 2023-06-21 DIAGNOSIS — Z90.3 H/O GASTRIC SLEEVE: ICD-10-CM

## 2023-06-21 RX ORDER — SODIUM CHLORIDE 9 MG/ML
20 INJECTION, SOLUTION INTRAVENOUS ONCE
Status: CANCELLED | OUTPATIENT
Start: 2023-06-22

## 2023-06-21 RX ORDER — CYANOCOBALAMIN 1000 UG/ML
1000 INJECTION, SOLUTION INTRAMUSCULAR; SUBCUTANEOUS ONCE
Status: CANCELLED | OUTPATIENT
Start: 2023-06-22 | End: 2023-06-22

## 2023-06-22 ENCOUNTER — TELEPHONE (OUTPATIENT)
Dept: URGENT CARE | Facility: CLINIC | Age: 38
End: 2023-06-22

## 2023-06-22 ENCOUNTER — HOSPITAL ENCOUNTER (OUTPATIENT)
Dept: INFUSION CENTER | Facility: HOSPITAL | Age: 38
Discharge: HOME | End: 2023-06-22
Attending: INTERNAL MEDICINE
Payer: COMMERCIAL

## 2023-06-22 VITALS
SYSTOLIC BLOOD PRESSURE: 117 MMHG | HEART RATE: 89 BPM | OXYGEN SATURATION: 98 % | TEMPERATURE: 98.3 F | RESPIRATION RATE: 16 BRPM | DIASTOLIC BLOOD PRESSURE: 83 MMHG

## 2023-06-22 DIAGNOSIS — K95.89 IRON DEFICIENCY ANEMIA FOLLOWING BARIATRIC SURGERY: ICD-10-CM

## 2023-06-22 DIAGNOSIS — J02.0 STREP PHARYNGITIS: Primary | ICD-10-CM

## 2023-06-22 DIAGNOSIS — Z90.3 POSTGASTRECTOMY MALABSORPTION: Primary | ICD-10-CM

## 2023-06-22 DIAGNOSIS — F41.9 ANXIETY: ICD-10-CM

## 2023-06-22 DIAGNOSIS — D50.8 IRON DEFICIENCY ANEMIA FOLLOWING BARIATRIC SURGERY: ICD-10-CM

## 2023-06-22 DIAGNOSIS — Z90.3 H/O GASTRIC SLEEVE: ICD-10-CM

## 2023-06-22 DIAGNOSIS — D51.9 ANEMIA DUE TO VITAMIN B12 DEFICIENCY, UNSPECIFIED B12 DEFICIENCY TYPE: ICD-10-CM

## 2023-06-22 DIAGNOSIS — K91.2 POSTGASTRECTOMY MALABSORPTION: Primary | ICD-10-CM

## 2023-06-22 LAB — BACTERIA THROAT CULT: ABNORMAL

## 2023-06-22 PROCEDURE — 96366 THER/PROPH/DIAG IV INF ADDON: CPT

## 2023-06-22 PROCEDURE — 96365 THER/PROPH/DIAG IV INF INIT: CPT

## 2023-06-22 PROCEDURE — 96372 THER/PROPH/DIAG INJ SC/IM: CPT

## 2023-06-22 RX ORDER — DIPHENHYDRAMINE HCL 25 MG
25 TABLET ORAL ONCE
Status: CANCELLED
Start: 2023-06-30 | End: 2023-06-30

## 2023-06-22 RX ORDER — ACETAMINOPHEN 325 MG/1
650 TABLET ORAL ONCE
Status: CANCELLED
Start: 2023-06-30 | End: 2023-06-30

## 2023-06-22 RX ORDER — FLUCONAZOLE 150 MG/1
150 TABLET ORAL ONCE
Qty: 1 TABLET | Refills: 0 | Status: SHIPPED | OUTPATIENT
Start: 2023-06-22 | End: 2023-06-22

## 2023-06-22 RX ORDER — CYANOCOBALAMIN 1000 UG/ML
1000 INJECTION, SOLUTION INTRAMUSCULAR; SUBCUTANEOUS ONCE
Status: COMPLETED | OUTPATIENT
Start: 2023-06-22 | End: 2023-06-22

## 2023-06-22 RX ORDER — CYANOCOBALAMIN 1000 UG/ML
1000 INJECTION, SOLUTION INTRAMUSCULAR; SUBCUTANEOUS ONCE
Status: CANCELLED | OUTPATIENT
Start: 2023-06-30 | End: 2023-06-30

## 2023-06-22 RX ORDER — SODIUM CHLORIDE 9 MG/ML
20 INJECTION, SOLUTION INTRAVENOUS ONCE
Status: COMPLETED | OUTPATIENT
Start: 2023-06-22 | End: 2023-06-22

## 2023-06-22 RX ORDER — CLONAZEPAM 1 MG/1
1 TABLET ORAL 2 TIMES DAILY
Qty: 60 TABLET | Refills: 0 | Status: SHIPPED | OUTPATIENT
Start: 2023-06-22

## 2023-06-22 RX ORDER — ACETAMINOPHEN 325 MG/1
650 TABLET ORAL ONCE
Status: CANCELLED
Start: 2023-06-22 | End: 2023-06-22

## 2023-06-22 RX ORDER — DIPHENHYDRAMINE HCL 25 MG
25 TABLET ORAL ONCE
Status: CANCELLED
Start: 2023-06-22 | End: 2023-06-22

## 2023-06-22 RX ORDER — SODIUM CHLORIDE 9 MG/ML
20 INJECTION, SOLUTION INTRAVENOUS ONCE
Status: CANCELLED | OUTPATIENT
Start: 2023-06-30

## 2023-06-22 RX ORDER — CEPHALEXIN 500 MG/1
500 CAPSULE ORAL EVERY 12 HOURS SCHEDULED
Qty: 20 CAPSULE | Refills: 0 | Status: SHIPPED | OUTPATIENT
Start: 2023-06-22 | End: 2023-07-02

## 2023-06-22 RX ORDER — DIPHENHYDRAMINE HCL 25 MG
25 TABLET ORAL ONCE
Status: COMPLETED | OUTPATIENT
Start: 2023-06-22 | End: 2023-06-22

## 2023-06-22 RX ORDER — ACETAMINOPHEN 325 MG/1
650 TABLET ORAL ONCE
Status: COMPLETED | OUTPATIENT
Start: 2023-06-22 | End: 2023-06-22

## 2023-06-22 RX ADMIN — DIPHENHYDRAMINE HYDROCHLORIDE 25 MG: 25 TABLET ORAL at 08:44

## 2023-06-22 RX ADMIN — ACETAMINOPHEN 650 MG: 325 TABLET ORAL at 08:44

## 2023-06-22 RX ADMIN — SODIUM CHLORIDE 20 ML/HR: 0.9 INJECTION, SOLUTION INTRAVENOUS at 08:35

## 2023-06-22 RX ADMIN — CYANOCOBALAMIN 1000 MCG: 1000 INJECTION, SOLUTION INTRAMUSCULAR; SUBCUTANEOUS at 10:46

## 2023-06-22 RX ADMIN — IRON SUCROSE 300 MG: 20 INJECTION, SOLUTION INTRAVENOUS at 09:04

## 2023-07-05 ENCOUNTER — OFFICE VISIT (OUTPATIENT)
Dept: FAMILY MEDICINE CLINIC | Facility: CLINIC | Age: 38
End: 2023-07-05
Payer: COMMERCIAL

## 2023-07-05 ENCOUNTER — TELEPHONE (OUTPATIENT)
Dept: FAMILY MEDICINE CLINIC | Facility: CLINIC | Age: 38
End: 2023-07-05

## 2023-07-05 VITALS
TEMPERATURE: 97 F | WEIGHT: 184 LBS | HEIGHT: 65 IN | BODY MASS INDEX: 30.66 KG/M2 | DIASTOLIC BLOOD PRESSURE: 68 MMHG | OXYGEN SATURATION: 98 % | SYSTOLIC BLOOD PRESSURE: 112 MMHG | HEART RATE: 83 BPM

## 2023-07-05 DIAGNOSIS — F51.01 PRIMARY INSOMNIA: Primary | ICD-10-CM

## 2023-07-05 DIAGNOSIS — Z13.88 SCREENING FOR LEAD EXPOSURE: ICD-10-CM

## 2023-07-05 DIAGNOSIS — G43.809 OTHER MIGRAINE WITHOUT STATUS MIGRAINOSUS, NOT INTRACTABLE: ICD-10-CM

## 2023-07-05 DIAGNOSIS — K21.00 GASTROESOPHAGEAL REFLUX DISEASE WITH ESOPHAGITIS WITHOUT HEMORRHAGE: ICD-10-CM

## 2023-07-05 PROCEDURE — 99214 OFFICE O/P EST MOD 30 MIN: CPT | Performed by: FAMILY MEDICINE

## 2023-07-05 RX ORDER — TRAZODONE HYDROCHLORIDE 50 MG/1
25 TABLET ORAL
Qty: 15 TABLET | Refills: 1 | Status: SHIPPED | OUTPATIENT
Start: 2023-07-05

## 2023-07-05 RX ORDER — PANTOPRAZOLE SODIUM 40 MG/1
40 TABLET, DELAYED RELEASE ORAL DAILY
COMMUNITY
Start: 2023-05-17 | End: 2023-07-05 | Stop reason: SDUPTHER

## 2023-07-05 RX ORDER — METOPROLOL SUCCINATE 25 MG/1
25 TABLET, EXTENDED RELEASE ORAL EVERY MORNING
COMMUNITY
Start: 2023-05-21

## 2023-07-05 RX ORDER — PANTOPRAZOLE SODIUM 40 MG/1
40 TABLET, DELAYED RELEASE ORAL DAILY
Qty: 90 TABLET | Refills: 1 | Status: SHIPPED | OUTPATIENT
Start: 2023-07-05

## 2023-07-05 NOTE — TELEPHONE ENCOUNTER
Belsomra 10mg Si tab HS Prn #[id number] - Rx'd today at [facility]    Alternative requested - medication NOT covered by plan    NO alternatives where given.

## 2023-07-05 NOTE — PROGRESS NOTES
BMI Counseling: Body mass index is 30.62 kg/m². The BMI is above normal. Nutrition recommendations include decreasing portion sizes, encouraging healthy choices of fruits and vegetables, moderation in carbohydrate intake and increasing intake of lean protein. Exercise recommendations include moderate physical activity 150 minutes/week. Rationale for BMI follow-up plan is due to patient being overweight or obese. Assessment/Plan: Patient is in need of a lead levels because of a family exposure also patient has primary insomnia and we will trial her on Belsomra for that she is weaning off of her Klonopin changing jobs where there will be much less stress    Problem List Items Addressed This Visit        Cardiovascular and Mediastinum    Migraine, unspecified, not intractable, without status migrainosus    Relevant Medications    metoprolol succinate (TOPROL-XL) 25 mg 24 hr tablet   Other Visit Diagnoses     Primary insomnia    -  Primary           Diagnoses and all orders for this visit:    Primary insomnia    Other migraine without status migrainosus, not intractable    Other orders  -     metoprolol succinate (TOPROL-XL) 25 mg 24 hr tablet; Take 25 mg by mouth every morning  -     pantoprazole (PROTONIX) 40 mg tablet; Take 40 mg by mouth daily  -     Magnesium-Policosanol 197-20 MG CAPS        No problem-specific Assessment & Plan notes found for this encounter. Subjective:      Patient ID: Marsha Faith is a 44 y.o. female. Mrs. 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 also she is weaning herself off of Klonopin she is the mother of 2 and third issue is possible lead source in the home her children both have high lead levels and are being treated for this through her pediatrician he would like to be tested      The following portions of the patient's history were reviewed and updated as appropriate:   She has a past medical history of Abnormal LFTs (liver function tests), Adalimumab (Humira) long-term use, Affective disorder ([address]), Anxiety, Arthritis, Aspiration into airway, Benign neoplasm of pituitary gland (HCC), Chondromalacia, patella, Common migraine without aura, Depression with anxiety, Elevated C-reactive protein, Exertional dyspnea, GERD (gastroesophageal reflux disease), Hemicrania continua, High protein C activity level, Hyperprolactinemia (HCC), Long-term use of hydroxychloroquine, Migraine, Polyarthritis, Polycystic ovarian disease, Prediabetes, Rheumatoid arthritis ([address]), Seronegative rheumatoid arthritis ([address]), TMJ (dislocation of temporomandibular joint), Trochanteric bursitis of right hip, and Vitamin D deficiency. ,  does not have any pertinent problems on file. ,   has a past surgical history that includes [place] tooth extraction; Tonsillectomy; Nasal septum surgery; [place] tooth extraction; Nasal septum surgery; Tonsillectomy; Dental surgery; Gastrectomy; Tooth extraction; and Cholecystectomy. ,  family history includes Arthritis in her mother; Cancer in her paternal grandfather; Colon cancer in her family; Coronary artery disease in her family; Diabetes in her family and paternal grandmother; Lung disease in her maternal grandfather; No Known Problems in her father; Psoriasis in her mother. ,   reports that she has never smoked. She has never used smokeless tobacco. She reports that she does not currently use alcohol. She reports that she does not use drugs. ,  is allergic to augmentin [amoxicillin-pot clavulanate], nsaids, and pollen extract. .  Current Outpatient Medications   Medication Sig Dispense Refill   • clonazePAM (KlonoPIN) 1 mg tablet Take 1 tablet (1 mg total) by mouth 2 (two) times a day 60 tablet 0   • cyanocobalamin (VITAMIN B-12) 1000 MCG tablet Take 1 tablet (1,000 mcg total) by mouth daily 30 tablet 3   • Diclofenac Sodium (VOLTAREN) 1 % Apply 2 g topically 4 (four) times a day 150 g 3   • Levonorgestrel (MIRENA) 20 MCG/DAY IUD 1 each by Intrauterine route once     • Melatonin 5 MG TABS Take 5 mg by mouth daily     • SUMAtriptan Succinate 6 MG/0.5ML SOAJ Inject 0.5mL at onset of headache and may repeat in 2 hours, MAX 2 inject a day MAX 3 days/week 1 mL 0   • Cholecalciferol (REPLESTA PO) Take 10,000 Units by mouth once a week (Patient not taking: Reported on 1/31/2023)     • Magnesium-Policosanol 766-54 MG CAPS      • metoprolol succinate (TOPROL-XL) 25 mg 24 hr tablet Take 25 mg by mouth every morning     • pantoprazole (PROTONIX) 40 mg tablet Take 40 mg by mouth daily       No current facility-administered medications for this visit. Review of Systems   Constitutional: Negative for activity change, appetite change, diaphoresis, fatigue and fever. HENT: Negative. Negative for dental problem and hearing loss. Eyes: Negative. Negative for visual disturbance. Respiratory: Negative for apnea, cough, chest tightness, shortness of breath and wheezing. Cardiovascular: Negative for chest pain, palpitations and leg swelling. Gastrointestinal: Negative for abdominal distention, abdominal pain, anal bleeding, constipation, diarrhea, nausea and vomiting. Endocrine: Negative for cold intolerance, heat intolerance, polydipsia, polyphagia and polyuria. Genitourinary: Negative for difficulty urinating, dysuria, flank pain, hematuria and urgency. Musculoskeletal: Positive for arthralgias. Negative for back pain, gait problem, joint swelling and myalgias. Skin: Negative for color change, rash and wound. Allergic/Immunologic: Negative for environmental allergies, food allergies and immunocompromised state. Neurological: Negative for dizziness, seizures, syncope, speech difficulty, numbness and headaches. Hematological: Negative for adenopathy. Does not bruise/bleed easily. Psychiatric/Behavioral: Positive for sleep disturbance.  Negative for agitation, behavioral problems, hallucinations and suicidal ideas. Objective:  Vitals:    07/05/23 0658   BP: 112/68   BP Location: Left arm   Patient Position: Sitting   Cuff Size: Large   Pulse: 83   Temp: (!) 97 °F (36.1 °C)   TempSrc: Temporal   SpO2: 98%   Weight: 83.5 kg (184 lb)   Height: 5' 5" (1.651 m)     Body mass index is 30.62 kg/m². Physical Exam  Constitutional:       General: She is not in acute distress. Appearance: She is well-developed. She is not diaphoretic. HENT:      Head: Normocephalic. Right Ear: External ear normal.      Left Ear: External ear normal.      Nose: Nose normal.   Eyes:      General: No scleral icterus. Right eye: No discharge. Left eye: No discharge. Conjunctiva/sclera: Conjunctivae normal.      Pupils: Pupils are equal, round, and reactive to light. Neck:      Thyroid: No thyromegaly. Trachea: No tracheal deviation. Cardiovascular:      Rate and Rhythm: Normal rate and regular rhythm. Heart sounds: Normal heart sounds. No murmur heard. No friction rub. No gallop. Pulmonary:      Effort: Pulmonary effort is normal. No respiratory distress. Breath sounds: Normal breath sounds. No wheezing. Abdominal:      General: Bowel sounds are normal.      Palpations: Abdomen is soft. There is no mass. Tenderness: There is no abdominal tenderness. There is no guarding. Musculoskeletal:         General: No deformity. Cervical back: Normal range of motion. Lymphadenopathy:      Cervical: No cervical adenopathy. Skin:     General: Skin is warm and dry. Findings: No erythema or rash. Neurological:      Mental Status: She is alert and oriented to person, place, and time. Cranial Nerves: No cranial nerve deficit. Psychiatric:         Thought Content:  Thought content normal.

## 2023-07-07 ENCOUNTER — OFFICE VISIT (OUTPATIENT)
Dept: HEMATOLOGY ONCOLOGY | Facility: CLINIC | Age: 38
End: 2023-07-07
Payer: COMMERCIAL

## 2023-07-07 ENCOUNTER — APPOINTMENT (OUTPATIENT)
Dept: LAB | Facility: HOSPITAL | Age: 38
End: 2023-07-07
Payer: COMMERCIAL

## 2023-07-07 VITALS
WEIGHT: 184.8 LBS | BODY MASS INDEX: 29.7 KG/M2 | DIASTOLIC BLOOD PRESSURE: 71 MMHG | HEART RATE: 79 BPM | SYSTOLIC BLOOD PRESSURE: 105 MMHG | OXYGEN SATURATION: 98 % | TEMPERATURE: 97.1 F | HEIGHT: 66 IN | RESPIRATION RATE: 16 BRPM

## 2023-07-07 DIAGNOSIS — K95.89 IRON DEFICIENCY ANEMIA FOLLOWING BARIATRIC SURGERY: ICD-10-CM

## 2023-07-07 DIAGNOSIS — K91.2 POSTGASTRECTOMY MALABSORPTION: Primary | ICD-10-CM

## 2023-07-07 DIAGNOSIS — Z13.88 SCREENING FOR LEAD EXPOSURE: ICD-10-CM

## 2023-07-07 DIAGNOSIS — D50.8 IRON DEFICIENCY ANEMIA FOLLOWING BARIATRIC SURGERY: ICD-10-CM

## 2023-07-07 DIAGNOSIS — Z90.3 POSTGASTRECTOMY MALABSORPTION: Primary | ICD-10-CM

## 2023-07-07 DIAGNOSIS — D51.9 ANEMIA DUE TO VITAMIN B12 DEFICIENCY, UNSPECIFIED B12 DEFICIENCY TYPE: ICD-10-CM

## 2023-07-07 DIAGNOSIS — Z90.3 H/O GASTRIC SLEEVE: ICD-10-CM

## 2023-07-07 PROCEDURE — 36415 COLL VENOUS BLD VENIPUNCTURE: CPT

## 2023-07-07 PROCEDURE — 99213 OFFICE O/P EST LOW 20 MIN: CPT | Performed by: NURSE PRACTITIONER

## 2023-07-07 PROCEDURE — 83655 ASSAY OF LEAD: CPT

## 2023-07-07 RX ORDER — ACETAMINOPHEN 325 MG/1
650 TABLET ORAL ONCE
Start: 2023-09-14 | End: 2023-09-14

## 2023-07-07 RX ORDER — DIPHENHYDRAMINE HCL 25 MG
25 TABLET ORAL ONCE
Start: 2023-09-14 | End: 2023-09-14

## 2023-07-07 RX ORDER — SODIUM CHLORIDE 9 MG/ML
20 INJECTION, SOLUTION INTRAVENOUS ONCE
OUTPATIENT
Start: 2023-09-14

## 2023-07-07 RX ORDER — CYANOCOBALAMIN 1000 UG/ML
1000 INJECTION, SOLUTION INTRAMUSCULAR; SUBCUTANEOUS ONCE
OUTPATIENT
Start: 2023-09-14 | End: 2023-09-14

## 2023-07-07 NOTE — PROGRESS NOTES
[address] [facility] [address] [address] [phone]  Hematology Ambulatory Follow-Up  [place], 1985, [id number]  7/7/2023    Assessment/Plan:    1. Postgastrectomy malabsorption  2. H/O gastric sleeve  3. Iron deficiency anemia following bariatric surgery  4. Anemia due to vitamin B12 deficiency, unspecified B12 deficiency type   Patient is a 68-year-old female with a history of seasonal affective disorder, anxiety, arthritis, GERD, polycystic ovarian disease, rheumatoid arthritis, gastric sleeve bariatric surgery in 2017 with subsequent iron deficiency. She is currently on maintenance Venofer infusions and vitamin B12 injections every 56 days. CBC from 5/25/2023 was within normal limits, CMP stable, vitamin B12 level low at 132 previously 261 in March 2023. Ferritin 37 and iron saturation 34%. We discussed extending maintenance infusions from 56 days to 84 days. I advised patient to start oral vitamin B12 500 mcg p.o. once daily. We will monitor labs and adjust infusions accordingly. We will see her in 1 year, patient verbalized understanding and is agreement with the plan. - CBC; Standing  - Iron Panel (Includes Ferritin, Iron Sat%, Iron, and TIBC); Standing  - Vitamin B12; Standing  - CBC  - Iron Panel (Includes Ferritin, Iron Sat%, Iron, and TIBC)  - Vitamin B12    Barrier(s) to care: None  The patient is able to self care. [facility]    Interval history: Clinically stable    Review of Systems   Constitutional: Negative for activity change, appetite change, fatigue, fever and unexpected weight change. Respiratory: Negative for cough and shortness of breath. Cardiovascular: Negative for chest pain and leg swelling. Gastrointestinal: Negative for abdominal pain, constipation, diarrhea and nausea.    Endocrine: Negative for cold intolerance and heat intolerance. Musculoskeletal: Positive for arthralgias. Negative for myalgias. Skin: Negative. Neurological: Negative for dizziness, weakness and headaches. Hematological: Negative for adenopathy. Does not bruise/bleed easily.      Past Medical History:   Diagnosis Date   • Abnormal LFTs (liver function tests)     LAST ASSESSED: 27PSL2889   • Adalimumab (Humira) long-term use     LAST ASSESSED: 26OCT2016   • Affective disorder (HCC)     LAST ASSESSED: 40IZZ5473   • Anxiety    • Arthritis    • Aspiration into airway     LAST ASSESSED: 16FRP9211   • Benign neoplasm of pituitary gland (HCC)     LAST ASSESSED: 41GXH8361   • Chondromalacia, patella     LAST ASSESSED: 61MIA0326   • Common migraine without aura     LAST ASSESSED: 17BPN9666   • Depression with anxiety    • Elevated C-reactive protein     LAST ASSESSED: 90NAI1626   • Exertional dyspnea     LAST ASSESSED: 79EFS7629   • GERD (gastroesophageal reflux disease)    • Hemicrania continua     LAST ASSESSED: 90IDJ0378   • High protein C activity level     LAST ASSESSED: 58JUR3831   • Hyperprolactinemia ([facility])     LAST ASSESSED: 47HEH4597   • Long-term use of hydroxychloroquine     LAST ASSESSED: 86NDC5786   • Migraine     LAST ASSESSED: 91VBZ2074   • Polyarthritis     LAST ASSESSED: 69DTE6741   • Polycystic ovarian disease    • Prediabetes     LAST ASSESSED: 90XZI7294   • Rheumatoid arthritis ([facility])     LAST ASSESSED: 71OXX3486   • Seronegative rheumatoid arthritis ([facility])     LAST ASSESSED: 59NFC7941   • TMJ (dislocation of temporomandibular joint)     APPEARANCE LAST ASSESSED: 47VHA3935   • Trochanteric bursitis of right hip     LAST ASSESSED: 48YFH7413   • Vitamin D deficiency     LAST ASSESSED: 86FWY7540     Past Surgical History:   Procedure Laterality Date   • CHOLECYSTECTOMY     • DENTAL SURGERY     • GASTRECTOMY      SLEEVE RESOLVED: 95IAK4216   • NASAL SEPTUM SURGERY      DEVIATION REPAIR   • NASAL SEPTUM SURGERY     • TONSILLECTOMY     • TONSILLECTOMY     • TOOTH EXTRACTION     • WISDOM TOOTH EXTRACTION     • WISDOM TOOTH EXTRACTION         Current Outpatient Medications:   •  clonazePAM (KlonoPIN) 1 mg tablet, Take 1 tablet (1 mg total) by mouth 2 (two) times a day, Disp: 60 tablet, Rfl: 0  •  cyanocobalamin (VITAMIN B-12) 1000 MCG tablet, Take 1 tablet (1,000 mcg total) by mouth daily, Disp: 30 tablet, Rfl: 3  •  Diclofenac Sodium (VOLTAREN) 1 %, Apply 2 g topically 4 (four) times a day, Disp: 150 g, Rfl: 3  •  Levonorgestrel (MIRENA) 20 MCG/DAY IUD, 1 each by Intrauterine route once, Disp: , Rfl:   •  Magnesium-Policosanol 173-02 MG CAPS, , Disp: , Rfl:   •  Melatonin 5 MG TABS, Take 5 mg by mouth daily, Disp: , Rfl:   •  metoprolol succinate (TOPROL-XL) 25 mg 24 hr tablet, Take 25 mg by mouth every morning, Disp: , Rfl:   •  pantoprazole (PROTONIX) 40 mg tablet, Take 1 tablet (40 mg total) by mouth daily, Disp: 90 tablet, Rfl: 1  •  SUMAtriptan Succinate 6 MG/0.5ML SOAJ, Inject 0.5mL at onset of headache and may repeat in 2 hours, MAX 2 inject a day MAX 3 days/week, Disp: 1 mL, Rfl: 0  •  traZODone (DESYREL) 50 mg tablet, Take 0.5 tablets (25 mg total) by mouth daily at bedtime, Disp: 15 tablet, Rfl: 1    Allergies   Allergen Reactions   • Augmentin [Amoxicillin-Pot Clavulanate] GI Intolerance and Vomiting     Category: Adverse Reaction;    • Nsaids      Other reaction(s): gastric sleeve   • Pollen Extract      Other reaction(s): Other (Please comment)  Pt has post nasal drip,sneezing,eyes watery. Objective:  /71 (BP Location: Right arm, Patient Position: Sitting, Cuff Size: Extra-Large)   Pulse 79   Temp (!) 97.1 °F (36.2 °C) (Tympanic)   Resp 16   Ht 5' 6" (1.676 m)   Wt 83.8 kg (184 lb 12.8 oz)   SpO2 98%   BMI 29.83 kg/m²    Physical Exam  Vitals reviewed. Constitutional:       Appearance: Normal appearance. She is well-developed. HENT:      Head: Normocephalic and atraumatic.    Eyes:      Conjunctiva/sclera: Conjunctivae normal.      Pupils: Pupils are equal, round, and reactive to light. Pulmonary:      Effort: Pulmonary effort is normal. No respiratory distress. Musculoskeletal:         General: Normal range of motion. Cervical back: Normal range of motion. Lymphadenopathy:      Cervical: No cervical adenopathy. Skin:     General: Skin is dry. Neurological:      Mental Status: She is alert and oriented to person, place, and time. Psychiatric:         Behavior: Behavior normal.     Result Review  Labs:  Office Visit on 06/20/2023   Component Date Value Ref Range Status   •  RAPID STREP A 06/20/2023 Negative  Negative Final   • Throat Culture 06/20/2023 2+ Growth of Beta Hemolytic Streptococcus Group A (A)   Corrected    This organism is intrinsically susceptible to Penicillin. If sensitivites to other antibiotics are required, "Provider/Physician" please call the Microbiology Department at [phone] within 5 days. Lab on 05/25/2023   Component Date Value Ref Range Status   • Hemoglobin A1C 05/25/2023 5.0  Normal 3.8-5.6%; PreDiabetic 5.7-6.4%;  Diabetic >=6.5%; Glycemic control for adults with diabetes <7.0% % Final   • EAG 05/25/2023 97  mg/dl Final   • WBC 05/25/2023 8.21  4.31 - 10.16 Thousand/uL Final   • RBC 05/25/2023 4.06  3.81 - 5.12 Million/uL Final   • Hemoglobin 05/25/2023 12.7  11.5 - 15.4 g/dL Final   • Hematocrit 05/25/2023 37.8  34.8 - 46.1 % Final   • MCV 05/25/2023 93  82 - 98 fL Final   • MCH 05/25/2023 31.3  26.8 - 34.3 pg Final   • MCHC 05/25/2023 33.6  31.4 - 37.4 g/dL Final   • RDW 05/25/2023 12.7  11.6 - 15.1 % Final   • MPV 05/25/2023 10.4  8.9 - 12.7 fL Final   • Platelets 08/92/8037 246  149 - 390 Thousands/uL Final   • nRBC 05/25/2023 0  /100 WBCs Final   • Neutrophils Relative 05/25/2023 54  43 - 75 % Final   • Immat GRANS % 05/25/2023 0  0 - 2 % Final   • Lymphocytes Relative 05/25/2023 37  14 - 44 % Final   • Monocytes Relative 05/25/2023 6  4 - 12 % Final   • Eosinophils Relative 05/25/2023 2  0 - 6 % Final   • Basophils Relative 05/25/2023 1  0 - 1 % Final   • Neutrophils Absolute 05/25/2023 4.43  1.85 - 7.62 Thousands/µL Final   • Immature Grans Absolute 05/25/2023 0.03  0.00 - 0.20 Thousand/uL Final   • Lymphocytes Absolute 05/25/2023 3.04  0.60 - 4.47 Thousands/µL Final   • Monocytes Absolute 05/25/2023 0.48  0.17 - 1.22 Thousand/µL Final   • Eosinophils Absolute 05/25/2023 0.19  0.00 - 0.61 Thousand/µL Final   • Basophils Absolute 05/25/2023 0.04  0.00 - 0.10 Thousands/µL Final   • Sodium 05/25/2023 140  135 - 147 mmol/L Final   • Potassium 05/25/2023 3.9  3.5 - 5.3 mmol/L Final   • Chloride 05/25/2023 107  96 - 108 mmol/L Final   • CO2 05/25/2023 26  21 - 32 mmol/L Final   • ANION GAP 05/25/2023 7  4 - 13 mmol/L Final   • BUN 05/25/2023 9  5 - 25 mg/dL Final   • Creatinine 05/25/2023 0.83  0.60 - 1.30 mg/dL Final    Standardized to IDMS reference method   • Glucose, Fasting 05/25/2023 80  65 - 99 mg/dL Final   • Calcium 05/25/2023 9.4  8.4 - 10.2 mg/dL Final   • AST 05/25/2023 11 (L)  13 - 39 U/L Final   • ALT 05/25/2023 11  7 - 52 U/L Final    Specimen collection should occur prior to Sulfasalazine administration due to the potential for falsely depressed results. • Alkaline Phosphatase 05/25/2023 44  34 - 104 U/L Final   • Total Protein 05/25/2023 7.3  6.4 - 8.4 g/dL Final   • Albumin 05/25/2023 4.2  3.5 - 5.0 g/dL Final   • Total Bilirubin 05/25/2023 0.56  0.20 - 1.00 mg/dL Final    Use of this assay is not recommended for patients undergoing treatment with eltrombopag due to the potential for falsely elevated results. N-acetyl-p-benzoquinone imine (metabolite of Acetaminophen) will generate erroneously low results in samples for patients that have taken an overdose of Acetaminophen.    • eGFR 05/25/2023 89  ml/min/1.73sq m Final   • Vitamin B-12 05/25/2023 132 (L)  180 - 914 pg/mL Final   • Iron Saturation 05/25/2023 34  15 - 50 % Final   • TIBC 05/25/2023 326  250 - 450 ug/dL Final   • Iron 05/25/2023 111  50 - 170 ug/dL Final    Patients treated with metal-binding drugs (ie. Deferoxamine) may have depressed iron values. • Ferritin 05/25/2023 37  11 - 307 ng/mL Final   • THYROID MICROSOMAL ANTIBODY 05/25/2023 <9  0 - 34 IU/mL Final   • Thyroglobulin Ab 05/25/2023 <1.0  0.0 - 0.9 IU/mL Final    Thyroglobulin Antibody measured by [facility]     Please note: This report has been generated by a voice recognition software system. Therefore there may be syntax, spelling, and/or grammatical errors. Please call if you have any questions.

## 2023-07-11 LAB — LEAD BLD-MCNC: <1 UG/DL (ref 0–3.4)

## 2023-08-01 DIAGNOSIS — F41.9 ANXIETY: ICD-10-CM

## 2023-08-01 NOTE — TELEPHONE ENCOUNTER
No relief - still experiencing Insomnia - No real sleep since last week. No relief with Trazodone 50mg - Not sure if it needs to be increased in strength     Asking for refill of Clonazepam 2mg hs until she is back in for appt.     She sent Dr carlisle message last week through [address] [place] [address], & chart is locked up for Pt

## 2023-08-02 DIAGNOSIS — F41.9 ANXIETY: Primary | ICD-10-CM

## 2023-08-02 DIAGNOSIS — F41.9 ANXIETY: ICD-10-CM

## 2023-08-02 RX ORDER — CLONAZEPAM 0.5 MG/1
0.5 TABLET ORAL
Qty: 30 TABLET | Refills: 0 | Status: SHIPPED | OUTPATIENT
Start: 2023-08-02

## 2023-08-02 RX ORDER — CLONAZEPAM 1 MG/1
1 TABLET ORAL 2 TIMES DAILY
Qty: 60 TABLET | Refills: 0 | OUTPATIENT
Start: 2023-08-02

## 2023-08-02 NOTE — TELEPHONE ENCOUNTER
Pt is asking please please for Klonopin - will do the combo if absolutely necessary NO relief from [address] - but she is not sleeping & has been up since last week - taking hours to fall asleep and then it lasts only an hour    Asking you please to refill her Klonopin 1mg

## 2023-08-02 NOTE — TELEPHONE ENCOUNTER
I think patient is actually trying to reduce the clonazepam dosage this is probably a duplicate request

## 2023-08-18 ENCOUNTER — HOSPITAL ENCOUNTER (OUTPATIENT)
Dept: INFUSION CENTER | Facility: HOSPITAL | Age: 38
Discharge: HOME | End: 2023-08-18
Attending: INTERNAL MEDICINE

## 2023-08-28 DIAGNOSIS — F41.9 ANXIETY: ICD-10-CM

## 2023-08-28 DIAGNOSIS — F51.01 PRIMARY INSOMNIA: ICD-10-CM

## 2023-08-28 RX ORDER — CLONAZEPAM 0.5 MG/1
0.5 TABLET ORAL
Qty: 30 TABLET | Refills: 0 | Status: SHIPPED | OUTPATIENT
Start: 2023-08-28

## 2023-08-28 RX ORDER — TRAZODONE HYDROCHLORIDE 50 MG/1
25 TABLET ORAL
Qty: 15 TABLET | Refills: 1 | Status: SHIPPED | OUTPATIENT
Start: 2023-08-28

## 2023-09-13 ENCOUNTER — APPOINTMENT (OUTPATIENT)
Dept: LAB | Facility: HOSPITAL | Age: 38
End: 2023-09-13
Payer: COMMERCIAL

## 2023-09-13 DIAGNOSIS — Z90.3 H/O GASTRIC SLEEVE: ICD-10-CM

## 2023-09-13 DIAGNOSIS — E61.1 LOW SERUM IRON: ICD-10-CM

## 2023-09-13 DIAGNOSIS — Z90.3 POSTGASTRECTOMY MALABSORPTION: ICD-10-CM

## 2023-09-13 DIAGNOSIS — D51.9 ANEMIA DUE TO VITAMIN B12 DEFICIENCY, UNSPECIFIED B12 DEFICIENCY TYPE: ICD-10-CM

## 2023-09-13 DIAGNOSIS — K91.2 POSTGASTRECTOMY MALABSORPTION: ICD-10-CM

## 2023-09-13 LAB
ALBUMIN SERPL BCP-MCNC: 4.4 G/DL (ref 3.5–5)
ALP SERPL-CCNC: 50 U/L (ref 34–104)
ALT SERPL W P-5'-P-CCNC: 10 U/L (ref 7–52)
ANION GAP SERPL CALCULATED.3IONS-SCNC: 9 MMOL/L
AST SERPL W P-5'-P-CCNC: 11 U/L (ref 13–39)
BASOPHILS # BLD AUTO: 0.04 THOUSANDS/ÂΜL (ref 0–0.1)
BASOPHILS NFR BLD AUTO: 1 % (ref 0–1)
BILIRUB SERPL-MCNC: 0.85 MG/DL (ref 0.2–1)
BUN SERPL-MCNC: 9 MG/DL (ref 5–25)
CALCIUM SERPL-MCNC: 9.7 MG/DL (ref 8.4–10.2)
CHLORIDE SERPL-SCNC: 104 MMOL/L (ref 96–108)
CO2 SERPL-SCNC: 26 MMOL/L (ref 21–32)
CREAT SERPL-MCNC: 0.81 MG/DL (ref 0.6–1.3)
EOSINOPHIL # BLD AUTO: 0.18 THOUSAND/ÂΜL (ref 0–0.61)
EOSINOPHIL NFR BLD AUTO: 3 % (ref 0–6)
ERYTHROCYTE [DISTWIDTH] IN BLOOD BY AUTOMATED COUNT: 12.3 % (ref 11.6–15.1)
FERRITIN SERPL-MCNC: 100 NG/ML (ref 11–307)
GFR SERPL CREATININE-BSD FRML MDRD: 92 ML/MIN/1.73SQ M
GLUCOSE P FAST SERPL-MCNC: 86 MG/DL (ref 65–99)
HCT VFR BLD AUTO: 42.8 % (ref 34.8–46.1)
HGB BLD-MCNC: 14.1 G/DL (ref 11.5–15.4)
IMM GRANULOCYTES # BLD AUTO: 0.02 THOUSAND/UL (ref 0–0.2)
IMM GRANULOCYTES NFR BLD AUTO: 0 % (ref 0–2)
IRON SATN MFR SERPL: 54 % (ref 15–50)
IRON SERPL-MCNC: 145 UG/DL (ref 50–212)
LYMPHOCYTES # BLD AUTO: 2.65 THOUSANDS/ÂΜL (ref 0.6–4.47)
LYMPHOCYTES NFR BLD AUTO: 37 % (ref 14–44)
MCH RBC QN AUTO: 30.1 PG (ref 26.8–34.3)
MCHC RBC AUTO-ENTMCNC: 32.9 G/DL (ref 31.4–37.4)
MCV RBC AUTO: 92 FL (ref 82–98)
MONOCYTES # BLD AUTO: 0.47 THOUSAND/ÂΜL (ref 0.17–1.22)
MONOCYTES NFR BLD AUTO: 7 % (ref 4–12)
NEUTROPHILS # BLD AUTO: 3.86 THOUSANDS/ÂΜL (ref 1.85–7.62)
NEUTS SEG NFR BLD AUTO: 52 % (ref 43–75)
NRBC BLD AUTO-RTO: 0 /100 WBCS
PLATELET # BLD AUTO: 244 THOUSANDS/UL (ref 149–390)
PMV BLD AUTO: 10.2 FL (ref 8.9–12.7)
POTASSIUM SERPL-SCNC: 3.9 MMOL/L (ref 3.5–5.3)
PROT SERPL-MCNC: 7.8 G/DL (ref 6.4–8.4)
RBC # BLD AUTO: 4.68 MILLION/UL (ref 3.81–5.12)
SODIUM SERPL-SCNC: 139 MMOL/L (ref 135–147)
TIBC SERPL-MCNC: 269 UG/DL (ref 250–450)
UIBC SERPL-MCNC: 124 UG/DL (ref 155–355)
VIT B12 SERPL-MCNC: 187 PG/ML (ref 180–914)
WBC # BLD AUTO: 7.22 THOUSAND/UL (ref 4.31–10.16)

## 2023-09-13 PROCEDURE — 83540 ASSAY OF IRON: CPT

## 2023-09-13 PROCEDURE — 85025 COMPLETE CBC W/AUTO DIFF WBC: CPT

## 2023-09-13 PROCEDURE — 82728 ASSAY OF FERRITIN: CPT

## 2023-09-13 PROCEDURE — 82607 VITAMIN B-12: CPT

## 2023-09-13 PROCEDURE — 83550 IRON BINDING TEST: CPT

## 2023-09-13 PROCEDURE — 36415 COLL VENOUS BLD VENIPUNCTURE: CPT

## 2023-09-13 PROCEDURE — 80053 COMPREHEN METABOLIC PANEL: CPT

## 2023-09-14 ENCOUNTER — TELEPHONE (OUTPATIENT)
Dept: HEMATOLOGY ONCOLOGY | Facility: CLINIC | Age: 38
End: 2023-09-14

## 2023-09-14 NOTE — TELEPHONE ENCOUNTER
Reviewed labs with patient. Iron saturation is 54% and ferritin level is 100. Patient has been on Venofer and vitamin B12 maintenance plan every 84 days. She is due on 9/15/2023. We will hold the Venofer infusion however vitamin B12 level remains low at 187. We will continue with the vitamin B12 injection. I reviewed with Damion Alegria RN at the infusion center. I added parameters to her infusion plan to hold Venofer if iron saturation is greater than 50%. I also messaged patient with plan change for tomorrow.

## 2023-09-15 ENCOUNTER — HOSPITAL ENCOUNTER (OUTPATIENT)
Dept: INFUSION CENTER | Facility: HOSPITAL | Age: 38
End: 2023-09-15
Attending: INTERNAL MEDICINE
Payer: COMMERCIAL

## 2023-09-15 VITALS — TEMPERATURE: 97 F

## 2023-09-15 DIAGNOSIS — D51.9 ANEMIA DUE TO VITAMIN B12 DEFICIENCY, UNSPECIFIED B12 DEFICIENCY TYPE: ICD-10-CM

## 2023-09-15 DIAGNOSIS — K91.2 POSTGASTRECTOMY MALABSORPTION: Primary | ICD-10-CM

## 2023-09-15 DIAGNOSIS — Z90.3 H/O GASTRIC SLEEVE: ICD-10-CM

## 2023-09-15 DIAGNOSIS — D50.8 IRON DEFICIENCY ANEMIA FOLLOWING BARIATRIC SURGERY: ICD-10-CM

## 2023-09-15 DIAGNOSIS — K95.89 IRON DEFICIENCY ANEMIA FOLLOWING BARIATRIC SURGERY: ICD-10-CM

## 2023-09-15 DIAGNOSIS — Z90.3 POSTGASTRECTOMY MALABSORPTION: Primary | ICD-10-CM

## 2023-09-15 PROCEDURE — 96372 THER/PROPH/DIAG INJ SC/IM: CPT

## 2023-09-15 RX ORDER — CYANOCOBALAMIN 1000 UG/ML
1000 INJECTION, SOLUTION INTRAMUSCULAR; SUBCUTANEOUS ONCE
OUTPATIENT
Start: 2023-12-07 | End: 2023-12-07

## 2023-09-15 RX ORDER — ACETAMINOPHEN 325 MG/1
650 TABLET ORAL ONCE
Start: 2023-12-07 | End: 2023-12-07

## 2023-09-15 RX ORDER — CYANOCOBALAMIN 1000 UG/ML
1000 INJECTION, SOLUTION INTRAMUSCULAR; SUBCUTANEOUS ONCE
Status: COMPLETED | OUTPATIENT
Start: 2023-09-15 | End: 2023-09-15

## 2023-09-15 RX ORDER — SODIUM CHLORIDE 9 MG/ML
20 INJECTION, SOLUTION INTRAVENOUS ONCE
OUTPATIENT
Start: 2023-12-07

## 2023-09-15 RX ORDER — DIPHENHYDRAMINE HCL 25 MG
25 TABLET ORAL ONCE
Start: 2023-12-07 | End: 2023-12-07

## 2023-09-15 RX ADMIN — CYANOCOBALAMIN 1000 MCG: 1000 INJECTION, SOLUTION INTRAMUSCULAR; SUBCUTANEOUS at 08:05

## 2023-09-15 NOTE — PROGRESS NOTES
Pt tolerated B12 injection in R deltoid well with no issues. Venofer infusion being held today per Chaim Chu, [facility]. Iron saturation parameters added to infusion plan going forward. Pt is to continue getting blood work taken on 3 month basis prior to infusion appointment. Pt will also continue B12 injections every 3 months as well. Pt aware and agreeable to above. AVS declined. Pt discharged ambulatory in stable condition with all personal belongings.

## 2023-09-18 DIAGNOSIS — F99 INSOMNIA DUE TO OTHER MENTAL DISORDER: Primary | ICD-10-CM

## 2023-09-18 DIAGNOSIS — G43.109 MIGRAINE WITH AURA AND WITHOUT STATUS MIGRAINOSUS, NOT INTRACTABLE: ICD-10-CM

## 2023-09-18 DIAGNOSIS — F51.05 INSOMNIA DUE TO OTHER MENTAL DISORDER: Primary | ICD-10-CM

## 2023-09-18 DIAGNOSIS — F41.8 DEPRESSION WITH ANXIETY: ICD-10-CM

## 2023-09-19 ENCOUNTER — TELEPHONE (OUTPATIENT)
Dept: PSYCHIATRY | Facility: CLINIC | Age: 38
End: 2023-09-19

## 2023-09-19 RX ORDER — CEFUROXIME AXETIL 250 MG/1
TABLET ORAL
Qty: 1 ML | Refills: 0 | Status: SHIPPED | OUTPATIENT
Start: 2023-09-19

## 2023-09-19 NOTE — TELEPHONE ENCOUNTER
Pt called in and left a voicemail returning the previous message she received. Please reach out to her when you are able.  # [phone]

## 2023-09-19 NOTE — TELEPHONE ENCOUNTER
Called Mag Booth  regarding ASAP. [facility] advising to return call to Intake Dept at [phone] to be scheduled. Pepper Murillo

## 2023-09-20 DIAGNOSIS — F41.9 ANXIETY: ICD-10-CM

## 2023-09-20 RX ORDER — CLONAZEPAM 0.5 MG/1
0.5 TABLET ORAL
Qty: 30 TABLET | Refills: 0 | Status: SHIPPED | OUTPATIENT
Start: 2023-09-20

## 2023-09-20 NOTE — TELEPHONE ENCOUNTER
Behavioral Health Outpatient Intake Questions    Referred By   : pcp    Please advise interviewee that they need to answer all questions truthfully to allow for best care, and any misrepresentations of information may affect their ability to be seen at this clinic   => Was this discussed? Yes     If Minor Child (under age 25)    Who is/are the legal guardian(s) of the child? Is there a custody agreement? No     • If "YES"- Custody orders must be obtained prior to scheduling the first appointment  • In addition, Consent to Treatment must be signed by all legal guardians prior to scheduling the first appointment    • If "NO"- Consent to Treatment must be signed by all legal guardians prior to scheduling the first appointment    Behavioral Health Outpatient Intake History -     Presenting Problem (in patient's own words): Chronic Insomnia    Are there any communication barriers for this patient? No                                               • If yes, please describe barriers:   • If there is a unique situation, please refer to [address] for final determination. Are you taking any psychiatric medications? Yes   •   If "YES" -What are they klonopin, trazadone  •   If "YES" -Who prescribes? pcp    Has the Patient previously received outpatient Talk Therapy or Medication Management from [facility]  No     •    If "YES"- When, Where and with Whom? •    If "NO" -Has Patient received these services elsewhere? •   If "YES" -When, Where, and with Whom? Has the Patient abused alcohol or other substances in the last 6 months ? No  No concerns of substance abuse are reported. •  If "YES" -What substance, How much, How often? •  If illegal substance: Refer to [facility] (for [facility]) or [facility]. •  If Alcohol in excess of 10 drinks per week:  Refer to [facility] (for [facility]AND[facility]) or [facility] History-     Is this treatment court ordered?  No   If "yes "send to :  • Talk Therapy : Send to [address] [place] [address] for final determination   • Med Management: Send to Dr Michael Terrazas for final determination     Has the Patient been convicted of a felony? No   If "Yes" send to -When, What? • Talk Therapy : Send to [address] [place] [address] for final determination   • Med Management: Send to Dr Michael Terrazas for final determination     ACCEPTED as a patient Yes  • If "Yes" Appointment Date: 10/19 with Marichuy Montenegro    Referred Elsewhere? No  • If “Yes” - (Where? Ex: [facility], [facility], [address] [place] [address], etc.)       Name of [employer]#[id number]  Insurance Phone #  If ins is primary or secondary? If patient is a minor, parents information such as Name, D. O.B of guarantor.

## 2023-09-29 DIAGNOSIS — B00.1 HERPES LABIALIS WITHOUT COMPLICATION: Primary | ICD-10-CM

## 2023-09-29 RX ORDER — FAMCICLOVIR 500 MG/1
TABLET ORAL
Qty: 3 TABLET | Refills: 0 | Status: SHIPPED | OUTPATIENT
Start: 2023-09-29 | End: 2023-09-29

## 2023-10-19 ENCOUNTER — TELEPHONE (OUTPATIENT)
Dept: PSYCHIATRY | Facility: CLINIC | Age: 38
End: 2023-10-19

## 2023-10-19 ENCOUNTER — OFFICE VISIT (OUTPATIENT)
Dept: PSYCHIATRY | Facility: CLINIC | Age: 38
End: 2023-10-19

## 2023-10-19 DIAGNOSIS — G47.00 INSOMNIA, UNSPECIFIED TYPE: ICD-10-CM

## 2023-10-19 DIAGNOSIS — F41.1 GENERALIZED ANXIETY DISORDER: Primary | ICD-10-CM

## 2023-10-19 DIAGNOSIS — F43.21 SITUATIONAL DEPRESSION: ICD-10-CM

## 2023-10-19 RX ORDER — MIRTAZAPINE 7.5 MG/1
7.5 TABLET, FILM COATED ORAL
Qty: 30 TABLET | Refills: 1 | Status: SHIPPED | OUTPATIENT
Start: 2023-10-19

## 2023-10-19 RX ORDER — HYDROXYZINE HYDROCHLORIDE 25 MG/1
25 TABLET, FILM COATED ORAL 2 TIMES DAILY PRN
Qty: 90 TABLET | Refills: 0 | Status: SHIPPED | OUTPATIENT
Start: 2023-10-19

## 2023-10-19 RX ORDER — CLONAZEPAM 0.5 MG/1
0.5 TABLET ORAL DAILY PRN
Qty: 15 TABLET | Refills: 0 | Status: SHIPPED | OUTPATIENT
Start: 2023-10-19

## 2023-10-19 NOTE — PSYCH
Outpatient Psychiatry Intake Exam       PCP: Sheila Jurado DO        Identifying information:  Marcus Bailey is a 43 y.o. female with a history of anxiety, insomnia here for evaluation and determination of mental health management needs. Sources of information:   Information for this evaluation was gathered through direct interview with the patient. Additionally EMR was reviewed. Confidentiality discussion: Limits of confidentiality in cases of safety concerns involving self and others as well as this physician's role as a mandatory  of abuse. They voiced understanding and a desire to continue with the evaluation. SUBJECTIVE     Chief Complaint / reason for visit: " I need assistance with chronic insomnia "    History of Present Illness:    Parth Ball is a  79-year-old female presenting for initial evaluation with past medical history, anxiety, insomnia, gastric sleeve, GERD. Currently maintained on Klonopin 0.5 mg at bedtime for chronic insomnia. States difficulty with sleep onset with awakenings during the night most of her adult life, symptoms were exacerbated after the death of her late  in  from brain cancer. Often sleeps 4 to 5 hours nightly with interrupted sleep throughout the morning hours, very rarely and sleeps a consistent 6 to 8 hours straight. Denies experiencing any flashbacks, nightmares, hypervigilance from 's death. History of anxiety disorder 5/10 with symptoms of frequent racing thoughts, worrying about many ideas at once, anticipatory worry, lying in bed replying her day, difficulty initiating sleep due to anxiety, history of 2-3 panic attacks in her lifetime. States symptoms were always present in adulthood but again exacerbated with the death of her  in . Denies any history of major depressive disorder, experiences situational depression depending on her current life stressors.   For example, her coworker  of a stroke back in May she had difficulty coping with stressor, felt depressed for 3 days afterwards. When she does feel depressed experiences crying spells, difficulty regulating her emotions, feeling inadequate as if she could be doing more with her life, low energy, decreased concentration. Denies ever experiencing loss of function from symptoms or 2 weeks of consistent depressive symptoms. Patient has remarried, lives locally with  and 2 young children under 3years old. Very busy caring for young children, working full-time in the banking industry, and attending school part-time to obtain her business administrative degree. Stressors causing anxiety and insomnia include balancing work, life, school stressors-always feels like she is behind in one aspect in her life and has trouble catching up. Further stressors include financial stress, passing of her  in 2012, coworker's death in May. Identifies relationship with  as her main support system. No history of abel or psychosis. Denies inpatient psychiatric history. Reports having previous psychiatrist through private company in [facility], medication trials include Prozac, Lexapro, Paxil which were not very effective for anxiety relief. Trazodone-not helpful for sleep. States Effexor and Zoloft worked considerably well for anxiety and situational depression, felt better and stopped both medications. We discussed medications to aid with insomnia, anxiety, depression and limiting use of benzodiazepine for sleep. Will initiate Remeron 7.5 mg daily for symptoms, Atarax 25 mg as needed for anxiety and insomnia. Use Klonopin 0.5 mg for severe anxiety only and limit to as needed usage with goal to titrate off completely if above medications allow for such. She denies any history of self-harm behaviors or suicide attempts in lifetime. Denies any drug or alcohol use.  Denies any significant trauma or PTSD symptoms, although watching her  go through what he did in 2012 appears to be contributing to symptoms. Recommended CBT psychotherapy, patient added to wait list.  Denies SI. Onset of symptoms was 2022 a few years ago with gradually worsening course since that time. Stressors: Balancing work, home life, school    HPI ROS:  Medication Side Effects: denies  Depression: 5 /10 (10 worst)  Anxiety: 5 /10 (10 worst)  Safety (SI, HI, other): denies si and hi  Sleep: poor 4-5 hrs  Energy: low  Appetite: 3 meals  Weight Change: denies recent change, hx gastric sleeve      In terms of depression, the patient endorses depressed mood, change in sleep, loss of energy, thoughts of worthlessness or guilt . In terms of bipolar disorder, the patient endorses no. Symptoms include no symptoms    BHUMI symptoms: excessive worry more days than not for longer than 3 months, difficulty concentrating, fatigue, insomnia, irritable, restlessness/keyed up, muscle tightness, and 3+ symptoms and worry are significantly detrimental  Panic Disorder symptoms: palpitations/racing heart, trembling, shortness of breath  Social Anxiety symptoms: no symptoms suggestive of social anxiety  OCD Symptoms: No significant symptoms supportive of OCD  Eating Disorder symptoms: no historical or current eating disorder. no binge eating disorder; no anorexia nervosa. no symptoms of bulimia    In terms of PTSD, the patient endorses exposure to trauma involving: denies; intrusive symptoms including (1+): no intrusive symptoms; avoidance symptoms including (1+): no avoidance symptoms; Negative alterations including (2+): no negative alteration symptoms; hyperarousal symptoms including (2+): no arousal symptoms.  Symptoms have been present for greater than 1 month    In terms of psychotic symptoms, the patient reports no psychotic symptoms now or in the past.    Past Psychiatric History  Previous diagnoses include Bhumi    Prior outpatient psychiatric treatment: yes private office in [facility]    Prior therapy: yes    Prior inpatient psychiatric treatment: denies    Prior suicide attempts: denies    Prior self harm: denies    Prior violence or aggression: denies    Social History:    The patient grew up in [address] [place] [address]. S.W was described as "my parents split up when I was 5, always close to my mother". During childhood, parents were mother very supportive and close with. They have 0 sister(s) and 0 brother(s). Abuse/neglect: none    As far as the patient (or present family member) is aware, overall childhood development: Patient does ascribe to normal developmental milestones such as walking, talking, potty training and making childhood friends. Education level: Currently in college    Current occupation: [employer]  Marital status:   Children: 2  Current Living Situation: the patient currently lives with  and 2 young children. Social support:     Quaker Affiliation: Denies   experience: Denies  Legal history: Denies  Access to Guns: Denies    Substance use and treatment:  Tobacco use: Denies  Caffeine Use: 1 cup daily  ETOH use: Denies  Other substance use: Denies.     Endorses previous experimentation with: Denies    Longest clean time: not applicable  History of Inpatient/Outpatient rehabilitation program: no      Traumatic History:     Abuse: none  Other Traumatic Events: none     Family Psychiatric History:     Psychiatric Illness:  no family history of psychiatric illness  Substance Abuse:  no family history of substance abuse  Suicide Attempts:  no family history of suicide attempts    Denies     Family History   Problem Relation Age of Onset    Arthritis Mother     Psoriasis Mother     No Known Problems Father     Lung disease Maternal Grandfather         BLACK    Diabetes Paternal Grandmother     Cancer Paternal Grandfather     Coronary artery disease Family     Colon cancer Family     Diabetes Family             Past Medical / Surgical History:    Current PCP: Yamil Cobb DO   Other providers include:     Patient Active Problem List   Diagnosis    Anxiety    Depression    Hyperprolactinemia (HCC)    Migraine, unspecified, not intractable, without status migrainosus    Pituitary microadenoma (HCC)    Gastroesophageal reflux disease    Anemia due to vitamin B12 deficiency    Postgastrectomy malabsorption    H/O gastric sleeve    Iron deficiency anemia following bariatric surgery       Past Medical History-  Past Medical History:   Diagnosis Date    Abnormal LFTs (liver function tests)     LAST ASSESSED: 17NOV2015    Adalimumab (Humira) long-term use     LAST ASSESSED: 26OCT2016    Affective disorder ([address])     LAST ASSESSED: [id number]    Anxiety     Arthritis     Aspiration into airway     LAST ASSESSED: 71JBV0864    Benign neoplasm of pituitary gland (HCC)     LAST ASSESSED: 23HGZ5191    Chondromalacia, patella     LAST ASSESSED: 38UBW8864    Common migraine without aura     LAST ASSESSED: 06XWU6104    Depression with anxiety     Elevated C-reactive protein     LAST ASSESSED: 26KXG0202    Exertional dyspnea     LAST ASSESSED: 98QXM1719    GERD (gastroesophageal reflux disease)     Hemicrania continua     LAST ASSESSED: 24RUK3777    High protein C activity level     LAST ASSESSED: 01LKM0556    Hyperprolactinemia (HCC)     LAST ASSESSED: 50KGR7056    Long-term use of hydroxychloroquine     LAST ASSESSED: 58UUV4593    Migraine     LAST ASSESSED: 17NOV2015    Polyarthritis     LAST ASSESSED: 49CUT2575    Polycystic ovarian disease     Prediabetes     LAST ASSESSED: 17OPB1369    Rheumatoid arthritis ([address])     LAST ASSESSED: 84EHR6439    Seronegative rheumatoid arthritis (HCC)     LAST ASSESSED: 67QAF3963    TMJ (dislocation of temporomandibular joint)     APPEARANCE LAST ASSESSED: 11OYI6446    Trochanteric bursitis of right hip     LAST ASSESSED: 30HJF2281    Vitamin D deficiency     LAST ASSESSED: 74SID8833        Denies     History of Seizures: no  History of Head injury-LOC-Concussion: no    Past Surgical History-  Past Surgical History:   Procedure Laterality Date    CHOLECYSTECTOMY      DENTAL SURGERY      GASTRECTOMY      SLEEVE RESOLVED: [id number]    NASAL SEPTUM SURGERY      DEVIATION REPAIR    NASAL SEPTUM SURGERY      TONSILLECTOMY      TONSILLECTOMY      TOOTH EXTRACTION      WISDOM TOOTH EXTRACTION      WISDOM TOOTH EXTRACTION            Allergies: Allergies   Allergen Reactions    Augmentin [Amoxicillin-Pot Clavulanate] GI Intolerance and Vomiting     Category: Adverse Reaction;     Nsaids      Other reaction(s): gastric sleeve    Pollen Extract      Other reaction(s): Other (Please comment)  Pt has post nasal drip,sneezing,eyes watery. Recent labs:  No visits with results within 1 Month(s) from this visit.    Latest known visit with results is:   Appointment on 09/13/2023   Component Date Value    WBC 09/13/2023 7.22     RBC 09/13/2023 4.68     Hemoglobin 09/13/2023 14.1     Hematocrit 09/13/2023 42.8     MCV 09/13/2023 92     MCH 09/13/2023 30.1     MCHC 09/13/2023 32.9     RDW 09/13/2023 12.3     MPV 09/13/2023 10.2     Platelets 05/05/5426 244     nRBC 09/13/2023 0     Neutrophils Relative 09/13/2023 52     Immat GRANS % 09/13/2023 0     Lymphocytes Relative 09/13/2023 37     Monocytes Relative 09/13/2023 7     Eosinophils Relative 09/13/2023 3     Basophils Relative 09/13/2023 1     Neutrophils Absolute 09/13/2023 3.86     Immature Grans Absolute 09/13/2023 0.02     Lymphocytes Absolute 09/13/2023 2.65     Monocytes Absolute 09/13/2023 0.47     Eosinophils Absolute 09/13/2023 0.18     Basophils Absolute 09/13/2023 0.04     Sodium 09/13/2023 139     Potassium 09/13/2023 3.9     Chloride 09/13/2023 104     CO2 09/13/2023 26     ANION GAP 09/13/2023 9     BUN 09/13/2023 9     Creatinine 09/13/2023 0.81     Glucose, Fasting 09/13/2023 86     Calcium 09/13/2023 9.7     AST 09/13/2023 11 (L)     ALT 09/13/2023 10     Alkaline Phosphatase 09/13/2023 50 Total Protein 09/13/2023 7.8     Albumin 09/13/2023 4.4     Total Bilirubin 09/13/2023 0.85     eGFR 09/13/2023 92     Vitamin B-12 09/13/2023 187     Iron Saturation 09/13/2023 54 (H)     TIBC 09/13/2023 269     Iron 09/13/2023 145     UIBC 09/13/2023 124 (L)     Ferritin 09/13/2023 100      Labs were reviewed and discussed with patient    Medical Review Of Systems:    Patient admits to history of gastric sleeve, arthritis, GERD, pituitary adenoma, history of migraines; otherwise Pertinent items are noted in HPI. Medications:  Current Outpatient Medications on File Prior to Visit   Medication Sig Dispense Refill    cyanocobalamin (VITAMIN B-12) 1000 MCG tablet Take 1 tablet (1,000 mcg total) by mouth daily 30 tablet 3    Diclofenac Sodium (VOLTAREN) 1 % Apply 2 g topically 4 (four) times a day 150 g 3    Levonorgestrel (MIRENA) 20 MCG/DAY IUD 1 each by Intrauterine route once      Magnesium-Policosanol 624-97 MG CAPS       Melatonin 5 MG TABS Take 5 mg by mouth daily      metoprolol succinate (TOPROL-XL) 25 mg 24 hr tablet Take 25 mg by mouth every morning      pantoprazole (PROTONIX) 40 mg tablet Take 1 tablet (40 mg total) by mouth daily 90 tablet 1    SUMAtriptan Succinate 6 MG/0.5ML SOAJ Inject 0.5mL at onset of headache and may repeat in 2 hours, MAX 2 inject a day MAX 3 days/week 1 mL 0    [DISCONTINUED] clonazePAM (KlonoPIN) 0.5 mg tablet Take 1 tablet (0.5 mg total) by mouth daily at bedtime 30 tablet 0    [DISCONTINUED] clonazePAM (KlonoPIN) 1 mg tablet Take 1 tablet (1 mg total) by mouth 2 (two) times a day 60 tablet 0    [DISCONTINUED] famciclovir (FAMVIR) 500 mg tablet 3 tablets as a single dose for fever  blister 3 tablet 0    [DISCONTINUED] traZODone (DESYREL) 50 mg tablet TAKE 0.5 TABLETS (25 MG TOTAL) BY MOUTH DAILY AT BEDTIME 15 tablet 1     No current facility-administered medications on file prior to visit. Medication Compliant?  yes    All current active medications have been reviewed. Objective     OBJECTIVE     There were no vitals taken for this visit. MENTAL STATUS EXAM  Appearance:  age appropriate   Behavior:  pleasant, cooperative, with good eye contact   Speech:  Normal volume, regular rate and rhythm   Mood:  anxious   Affect:  mood congruent   Language: intact and appropriate for age, education, and intellect   Thought Process:  Linear and goal directed   Associations: intact associations   Thought Content:  normal and appropriate   Perceptual Disturbances: no auditory or visual hallcunations   Risk Potential / Abnormal Thoughts: Suicidal ideation - None  Homicidal ideation - None  Potential for aggression - No       Consciousness:  Alert & Awake   Sensorium:  Grossly oriented   Attention: attention span and concentration are age appropriate   Intellect: within normal limits   Fund of Knowledge:  Memory: awareness of current events: yes  recent and remote memory grossly intact   Insight:  good   Judgment: good   Muscle Strength Muscle Tone: normal  normal   Gait/Station: normal gait/station with good balance   Motor Activity: no abnormal movements     Pain none   Pain Scale 0     IMPRESSIONS/FORMULATION          Diagnoses and all orders for this visit:    Generalized anxiety disorder  -     Ambulatory referral to Psych Services  -     mirtazapine (REMERON) 7.5 MG tablet; Take 1 tablet (7.5 mg total) by mouth daily at bedtime  -     hydrOXYzine HCL (ATARAX) 25 mg tablet; Take 1 tablet (25 mg total) by mouth 2 (two) times a day as needed for anxiety  -     clonazePAM (KlonoPIN) 0.5 mg tablet; Take 1 tablet (0.5 mg total) by mouth daily as needed for anxiety (severe anxiety)    Insomnia, unspecified type  -     Ambulatory referral to Psych Services  -     mirtazapine (REMERON) 7.5 MG tablet; Take 1 tablet (7.5 mg total) by mouth daily at bedtime  -     hydrOXYzine HCL (ATARAX) 25 mg tablet;  Take 1 tablet (25 mg total) by mouth 2 (two) times a day as needed for anxiety    Situational depression        1. Generalized anxiety disorder    2. Insomnia, unspecified type    3. Situational depression          Alma Montgomery is a 43 y.o. female who presents with symptoms supporting the aforementioned. Suicide / Homicide / Safety risk assessment: At this time Savanah Villegas is at low risk for harm of self or others. Plan:       Education about diagnosis and treatment modalities, patient voiced understanding and agreement with the following plan:    Discussed medication risks, beneftis, alternatives. Patient was informed and had time to ask questions. They agreed to treatment below    Controlled Medication Discussion:     Patient using medication appropriately  Jannette Ha has been filling controlled prescriptions on time as prescribed according to [address] program.   Discussed with Jannette Ha the risks of sedation, respiratory depression, impairment of ability to drive and potential for abuse and addiction related to treatment with benzodiazepine medications. She understands risk of treatment with benzodiazepine medications, agrees to not drive if feels impaired and agrees to take medications as prescribed. Discussed with [facility] warning on concurrent use of benzodiazepines and opioid medications including sedation, respiratory depression, coma and death. She understands the risk of treatment with benzodiazepines in addition to opioids and wants to continue taking those medications. Initial treatment plan:   1) MEDS:      - Initiate Remeron 7.5 mg at bedtime for insomnia, anxiety, depression symptoms. Zoloft and Effexor helpful in the past-consider in the future along with sleep agent. - PCP prescribing Klonopin 0.5 mg daily for sleep, not recommended to use benzodiazepines for sleep, decrease usage to Klonopin 0.5 mg as needed for severe anxiety only.   #15 tablets given today and hopeful to replace with as needed Atarax for safer long-term usage. Discussed with patient the risks of sedation, respiratory depression, impairment of ability to drive and potential for abuse and addiction related to treatment with benzodiazepine medications. The patient understands risk of treatment with benzodiazepine medications, agrees to not drive if feels impaired and agrees to take medications as prescribed. - Initiate as needed Atarax 25 mg for breakthrough anxiety and insomnia symptoms    2) Labs: Reviewed    3) Therapy:    - Added to wait list today    4) Medical:    - Pt will f/u with other providers as needed    5) Other: Support as needed   - Use crisis as needed        6) Follow up:   - Follow up in 4 weeks   - Patient will call if issues or concerns     7) Treatment Plan:    - Enacted on 10/19, due 4/19/2024     Discussed self monitoring of symptoms, and symptom monitoring tools. Patient has been informed of 24 hours and weekend coverage for urgent situations accessed by calling the main clinic phone number.          Visit Time    Visit Start Time: 7135  Visit Stop Time: 1870  Total Visit Duration:  50 minutes

## 2023-10-19 NOTE — BH TREATMENT PLAN
TREATMENT PLAN (Medication Management Only)        [address] [facility]    Name and Date of Birth:  Ulisses Polk 43 y.o. 1985  Date of Treatment Plan: October 19, 2023  Diagnosis/Diagnoses:    1. Generalized anxiety disorder    2. Insomnia, unspecified type      Strengths/Personal Resources for Self-Care: supportive family, supportive friends, taking medications as prescribed, work skills. Area/Areas of need (in own words): anxiety symptoms, depressive symptoms, sleep problems  1. Long Term Goal: improve control ofsleep. Target Date:6 months - 4/19/2024  Person/Persons responsible for completion of goal: Lupis  2. Short Term Objective (s) - How will we reach this goal?:   A. Provider new recommended medication/dosage changes and/or continue medication(s): continue current medications as prescribed. B. Attend medication management appointments regularly. C. Take psychiatric medications responsibly. Target Date:6 months - 4/19/2024  Person/Persons Responsible for Completion of Goal: Lupis  Progress Towards Goals: starting treatment  Treatment Modality: medication management every 1 months  Review due 180 days from date of this plan: 6 months - 4/19/2024  Expected length of service: ongoing treatment  My Physician/PA/NP and I have developed this plan together and I agree to work on the goals and objectives. I understand the treatment goals that were developed for my treatment.

## 2023-11-16 ENCOUNTER — TELEMEDICINE (OUTPATIENT)
Dept: PSYCHIATRY | Facility: CLINIC | Age: 38
End: 2023-11-16

## 2023-11-16 DIAGNOSIS — F43.21 SITUATIONAL DEPRESSION: ICD-10-CM

## 2023-11-16 DIAGNOSIS — G47.00 INSOMNIA, UNSPECIFIED TYPE: ICD-10-CM

## 2023-11-16 DIAGNOSIS — F41.1 GENERALIZED ANXIETY DISORDER: Primary | ICD-10-CM

## 2023-11-16 PROBLEM — Z98.84 HISTORY OF BARIATRIC SURGERY: Status: ACTIVE | Noted: 2019-05-14

## 2023-11-16 PROBLEM — M79.673 PAIN OF FOOT: Status: ACTIVE | Noted: 2023-11-16

## 2023-11-16 PROBLEM — R73.03 BORDERLINE DIABETES MELLITUS: Status: ACTIVE | Noted: 2023-11-16

## 2023-11-16 PROBLEM — E66.9 ADIPOSITY: Status: ACTIVE | Noted: 2023-11-16

## 2023-11-16 PROCEDURE — 99214 OFFICE O/P EST MOD 30 MIN: CPT

## 2023-11-16 RX ORDER — CLONAZEPAM 0.5 MG/1
0.5 TABLET ORAL DAILY PRN
Qty: 15 TABLET | Refills: 0 | Status: SHIPPED | OUTPATIENT
Start: 2023-11-16

## 2023-11-16 RX ORDER — MIRTAZAPINE 7.5 MG/1
7.5 TABLET, FILM COATED ORAL
Qty: 90 TABLET | Refills: 0 | Status: SHIPPED | OUTPATIENT
Start: 2023-11-16

## 2023-11-16 RX ORDER — HYDROXYZINE HYDROCHLORIDE 25 MG/1
25 TABLET, FILM COATED ORAL 2 TIMES DAILY PRN
Qty: 90 TABLET | Refills: 0 | Status: SHIPPED | OUTPATIENT
Start: 2023-11-16

## 2023-11-16 NOTE — PSYCH
Virtual Regular Visit    Verification of patient location:    Patient is located in the following state in which I hold an active license [place]    Problem List Items Addressed This Visit    None  Visit Diagnoses       Generalized anxiety disorder    -  Primary    Relevant Medications    clonazePAM (KlonoPIN) 0.5 mg tablet    mirtazapine (REMERON) 7.5 MG tablet    hydrOXYzine HCL (ATARAX) 25 mg tablet    Insomnia, unspecified type        Relevant Medications    mirtazapine (REMERON) 7.5 MG tablet    hydrOXYzine HCL (ATARAX) 25 mg tablet    Situational depression        Relevant Medications    mirtazapine (REMERON) 7.5 MG tablet    hydrOXYzine HCL (ATARAX) 25 mg tablet               Encounter provider FRANCOIS Espinoza    Provider located at    [address] [facility]  [address] [facility] [id number] [phone]    Recent Visits  No visits were found meeting these conditions. Showing recent visits within past 7 days and meeting all other requirements  Today's Visits  Date Type Provider Dept   11/16/23 Telemedicine FRANCOIS Espinoza  [facility]   Showing today's visits and meeting all other requirements  Future Appointments  No visits were found meeting these conditions. Showing future appointments within next 150 days and meeting all other requirements         The patient was identified by name and date of birth. Rafaela Monreal was informed that this is a telemedicine visit and [facility] She agrees to proceed. .  My office door was closed. No one else was in the room. She acknowledged consent and understanding of privacy and security of the video platform. The patient has agreed to participate and understands they can discontinue the visit at any time. Patient is aware this is a billable service.      HPI     Current Outpatient Medications   Medication Sig Dispense Refill clonazePAM (KlonoPIN) 0.5 mg tablet Take 1 tablet (0.5 mg total) by mouth daily as needed for anxiety (severe anxiety) 15 tablet 0    hydrOXYzine HCL (ATARAX) 25 mg tablet Take 1 tablet (25 mg total) by mouth 2 (two) times a day as needed for anxiety 90 tablet 0    mirtazapine (REMERON) 7.5 MG tablet Take 1 tablet (7.5 mg total) by mouth daily at bedtime 90 tablet 0    cyanocobalamin (VITAMIN B-12) 1000 MCG tablet Take 1 tablet (1,000 mcg total) by mouth daily 30 tablet 3    Diclofenac Sodium (VOLTAREN) 1 % Apply 2 g topically 4 (four) times a day 150 g 3    Levonorgestrel (MIRENA) 20 MCG/DAY IUD 1 each by Intrauterine route once      Magnesium-Policosanol 406-34 MG CAPS       Melatonin 5 MG TABS Take 5 mg by mouth daily      metoprolol succinate (TOPROL-XL) 25 mg 24 hr tablet Take 25 mg by mouth every morning      pantoprazole (PROTONIX) 40 mg tablet Take 1 tablet (40 mg total) by mouth daily 90 tablet 1    SUMAtriptan Succinate 6 MG/0.5ML SOAJ Inject 0.5mL at onset of headache and may repeat in 2 hours, MAX 2 inject a day MAX 3 days/week 1 mL 0     No current facility-administered medications for this visit. Review of Systems  Video Exam    There were no vitals filed for this visit. Physical Exam   As a result of this visit, I have referred the patient for further respiratory evaluation. No      VIRTUAL VISIT DISCLAIMER    Alexis Brooks acknowledges that she has consented to an online visit or consultation. She understands that the online visit is based solely on information provided by her, and that, in the absence of a face-to-face physical evaluation by the physician, the diagnosis she receives is both limited and provisional in terms of accuracy and completeness. This is not intended to replace a full medical face-to-face evaluation by the physician. Alexis Brooks understands and accepts these terms.     MEDICATION MANAGEMENT NOTE        [facility]    Name and Date of Birth:  Pito Bryant 43 y.o. 1985 MRN: [id number]    Date of Visit: November 16, 2023    Allergies   Allergen Reactions    Augmentin [Amoxicillin-Pot Clavulanate] GI Intolerance and Vomiting     Category: Adverse Reaction;     Nsaids      Other reaction(s): gastric sleeve    Pollen Extract      Other reaction(s): Other (Please comment)  Pt has post nasal drip,sneezing,eyes watery. SUBJECTIVE:    Wayne Lopez is seen today for a follow up for Generalized Anxiety Disorder and insomnia. She reports that she continues to improve slowly since the last visit. Patient recently initiated on Remeron 7.5 mg at bedtime for insomnia, anxiety, depression symptoms. Experiencing a significant improvement in maintaining sleep and duration of sleep, recently started sleeping 7 to 8 hours nightly much improved from 4 hours nightly previously. Reports a decrease in anxiety, less panic sensation, has tolerated the recent decrease in Klonopin with goal to discontinue in the future. #15 Klonopin given today as needed for severe anxiety only, has used Klonopin recently due to increase in work stress, goal to substitute with Atarax. Depression manageable 3/10 with occasional dysphoria, low energy, sadness related to 1 year anniversary of ex-'s passing. She appears to be improving and is overall happy with her progress, consider titration of Remeron at next visit if needed. Her current  remains a strong support system. Remains on psychotherapy list and  looks forward to initiating. Denies SI. She denies any side effects from medications. PLAN:      - Continue Remeron 7.5 mg at bedtime for insomnia, anxiety, depression symptoms.       - #15 Klonopin 0.5 mg prn for anxiety only, recently decreased dosage with goal to discontinue in future   Discussed with patient the risks of sedation, respiratory depression, impairment of ability to drive and potential for abuse and addiction related to treatment with benzodiazepine medications. The patient understands risk of treatment with benzodiazepine medications, agrees to not drive if feels impaired and agrees to take medications as prescribed. - Utilize as needed Atarax 25 mg for breakthrough anxiety and insomnia symptoms        Aware of 24 hour and weekend coverage for urgent situations accessed by calling [facility] main practice number    Diagnoses and all orders for this visit:    Generalized anxiety disorder  -     clonazePAM (KlonoPIN) 0.5 mg tablet; Take 1 tablet (0.5 mg total) by mouth daily as needed for anxiety (severe anxiety)  -     mirtazapine (REMERON) 7.5 MG tablet; Take 1 tablet (7.5 mg total) by mouth daily at bedtime  -     hydrOXYzine HCL (ATARAX) 25 mg tablet; Take 1 tablet (25 mg total) by mouth 2 (two) times a day as needed for anxiety    Insomnia, unspecified type  -     mirtazapine (REMERON) 7.5 MG tablet; Take 1 tablet (7.5 mg total) by mouth daily at bedtime  -     hydrOXYzine HCL (ATARAX) 25 mg tablet;  Take 1 tablet (25 mg total) by mouth 2 (two) times a day as needed for anxiety    Situational depression        Current Outpatient Medications on File Prior to Visit   Medication Sig Dispense Refill    cyanocobalamin (VITAMIN B-12) 1000 MCG tablet Take 1 tablet (1,000 mcg total) by mouth daily 30 tablet 3    Diclofenac Sodium (VOLTAREN) 1 % Apply 2 g topically 4 (four) times a day 150 g 3    Levonorgestrel (MIRENA) 20 MCG/DAY IUD 1 each by Intrauterine route once      Magnesium-Policosanol 526-06 MG CAPS       Melatonin 5 MG TABS Take 5 mg by mouth daily      metoprolol succinate (TOPROL-XL) 25 mg 24 hr tablet Take 25 mg by mouth every morning      pantoprazole (PROTONIX) 40 mg tablet Take 1 tablet (40 mg total) by mouth daily 90 tablet 1    SUMAtriptan Succinate 6 MG/0.5ML SOAJ Inject 0.5mL at onset of headache and may repeat in 2 hours, MAX 2 inject a day MAX 3 days/week 1 mL 0 [DISCONTINUED] clonazePAM (KlonoPIN) 0.5 mg tablet Take 1 tablet (0.5 mg total) by mouth daily as needed for anxiety (severe anxiety) 15 tablet 0    [DISCONTINUED] hydrOXYzine HCL (ATARAX) 25 mg tablet Take 1 tablet (25 mg total) by mouth 2 (two) times a day as needed for anxiety 90 tablet 0    [DISCONTINUED] mirtazapine (REMERON) 7.5 MG tablet Take 1 tablet (7.5 mg total) by mouth daily at bedtime 30 tablet 1     No current facility-administered medications on file prior to visit. Psychotherapy Provided:     Individual psychotherapy provided: Supportive counseling provided. Medication changes discussed with Jordy Danielson. Medication education provided to Jordy Danielson. HPI ROS Appetite Changes and Sleep:     She reports adequate number of sleep hours (7-8 hours), normal appetite, adequate energy level.  Denies homicidal ideation, denies suicidal ideation    Review Of Systems:      HPI ROS:               Medication Side Effects:  denies    Depression (10 worst): 3    Anxiety (10 worst): 3    Safety concerns (SI, HI, etc): Denies si and hi    Sleep: Admjvcwzj-4-4 hrs with remeron    Energy: fair    Appetite: 2-3 meals    Weight Change: denies        Mental Status Evaluation:    Appearance Adequate hygiene and grooming   Behavior calm and cooperative   Mood euthymic  Depression Scale - 3 of 10 (0 = No depression)  Anxiety Scale - 3 of 10 (0 = No anxiety)   Speech Normal rate and volume   Affect appropriate   Thought Processes Goal directed and coherent   Thought Content Does not verbalize delusional material   Associations Tightly connected   Perceptual Disturbances Denies hallucinations and does not appear to be responding to internal stimuli   Risk Potential Suicidal/Homicidal Ideation - No evidence of suicidal or homicidal ideation and patient does not verbalize suicidal or homicidal ideation  Risk of Violence - No evidence of risk for violence found on assessment  Risk of Self Mutilation - No evidence of risk for self mutilation found on assessment   Orientation oriented to person, place, time/date, and situation   Memory recent and remote memory grossly intact   Consciousness alert and awake   Attention/Concentration attention span and concentration are age appropriate   Insight intact   Judgement intact   Muscle Strength and Gait normal muscle strength and normal muscle tone, normal gait/station and normal balance   Motor Activity no abnormal movements   Language no difficulty naming common objects, no difficulty repeating a phrase, no difficulty writing a sentence   Fund of Knowledge adequate knowledge of current events  adequate fund of knowledge regarding past history  adequate fund of knowledge regarding vocabulary          Past Medical History:    Past Medical History:   Diagnosis Date    Abnormal LFTs (liver function tests)     LAST ASSESSED: [id number]    Adalimumab (Humira) long-term use     LAST ASSESSED: [id number]    Affective disorder ([address])     LAST ASSESSED: 29ULI7408    Anxiety     Arthritis     Aspiration into airway     LAST ASSESSED: 02MCY3779    Benign neoplasm of pituitary gland (HCC)     LAST ASSESSED: 91QSI5538    Chondromalacia, patella     LAST ASSESSED: 11WOY5173    Common migraine without aura     LAST ASSESSED: 77TQZ5535    Depression with anxiety     Elevated C-reactive protein     LAST ASSESSED: 47WBR4918    Exertional dyspnea     LAST ASSESSED: 37VDR4989    GERD (gastroesophageal reflux disease)     Hemicrania continua     LAST ASSESSED: 66FWX2268    High protein C activity level     LAST ASSESSED: 96WWV5883    Hyperprolactinemia ([address])     LAST ASSESSED: 24RVM6111    Long-term use of hydroxychloroquine     LAST ASSESSED: 01QGU9855    Migraine     LAST ASSESSED: 02LMT7804    Polyarthritis     LAST ASSESSED: 86VEY2234    Polycystic ovarian disease     Prediabetes     LAST ASSESSED: 13JTE9462    Rheumatoid arthritis ([address])     LAST ASSESSED: 20SQK2114    Seronegative rheumatoid arthritis ([address]) LAST ASSESSED: 94TGY4938    TMJ (dislocation of temporomandibular joint)     APPEARANCE LAST ASSESSED: 92NWS9288    Trochanteric bursitis of right hip     LAST ASSESSED: 14ZOT4247    Vitamin D deficiency     LAST ASSESSED: 55XMH3430        Past Surgical History:   Procedure Laterality Date    CHOLECYSTECTOMY      DENTAL SURGERY      GASTRECTOMY      SLEEVE RESOLVED: [id number]    NASAL SEPTUM SURGERY      DEVIATION REPAIR    NASAL SEPTUM SURGERY      TONSILLECTOMY      TONSILLECTOMY      TOOTH EXTRACTION      WISDOM TOOTH EXTRACTION      WISDOM TOOTH EXTRACTION       Allergies   Allergen Reactions    Augmentin [Amoxicillin-Pot Clavulanate] GI Intolerance and Vomiting     Category: Adverse Reaction;     Nsaids      Other reaction(s): gastric sleeve    Pollen Extract      Other reaction(s): Other (Please comment)  Pt has post nasal drip,sneezing,eyes watery.      Substance Abuse History:    Social History     Substance and Sexual Activity   Alcohol Use Not Currently    Comment: OCCASIONAL - 1 DRINK/MONTH AS PER [employer]     Social History     Substance and Sexual Activity   Drug Use No     Social History:    Social History     Socioeconomic History    Marital status: /Civil Union     Spouse name: Not on file    Number of children: Not on file    Years of education: HS OR GED    Highest education level: Not on file   Occupational History    Occupation: CUSTOMER SERVICE     Comment: FULL-TIME EMPLOYMENT   Tobacco Use    Smoking status: Never    Smokeless tobacco: Never   Vaping Use    Vaping Use: Never used   Substance and Sexual Activity    Alcohol use: Not Currently     Comment: OCCASIONAL - 1 DRINK/MONTH AS PER [employer]TS    Drug use: No    Sexual activity: Yes     Partners: Male     Birth control/protection: OCP   Other Topics Concern    Not on file   Social History Narrative    CAFFEINE USE    PATIENT HAS LIVING WILL     Social Determinants of Health     Financial Resource Strain: Not on file   Food Insecurity: Not on file   Transportation Needs: Not on file   Physical Activity: Not on file   Stress: Not on file   Social Connections: Not on file   Intimate Partner Violence: Not on file   Housing Stability: Not on file     Family Psychiatric History:     Family History   Problem Relation Age of Onset    Arthritis Mother     Psoriasis Mother     No Known Problems Father     Lung disease Maternal Grandfather         BLACK    Diabetes Paternal Grandmother     Cancer Paternal Grandfather     Coronary artery disease Family     Colon cancer Family     Diabetes Family      History Review: The following portions of the patient's history were reviewed and updated as appropriate: allergies, current medications, past family history, past medical history, past social history, past surgical history, and problem list     OBJECTIVE:     Vital signs in last 24 hours: There were no vitals filed for this visit. Laboratory Results: Recent Labs (last 2 months):   No visits with results within 2 Month(s) from this visit.    Latest known visit with results is:   Appointment on 09/13/2023   Component Date Value    WBC 09/13/2023 7.22     RBC 09/13/2023 4.68     Hemoglobin 09/13/2023 14.1     Hematocrit 09/13/2023 42.8     MCV 09/13/2023 92     MCH 09/13/2023 30.1     MCHC 09/13/2023 32.9     RDW 09/13/2023 12.3     MPV 09/13/2023 10.2     Platelets 22/27/6711 244     nRBC 09/13/2023 0     Neutrophils Relative 09/13/2023 52     Immat GRANS % 09/13/2023 0     Lymphocytes Relative 09/13/2023 37     Monocytes Relative 09/13/2023 7     Eosinophils Relative 09/13/2023 3     Basophils Relative 09/13/2023 1     Neutrophils Absolute 09/13/2023 3.86     Immature Grans Absolute 09/13/2023 0.02     Lymphocytes Absolute 09/13/2023 2.65     Monocytes Absolute 09/13/2023 0.47     Eosinophils Absolute 09/13/2023 0.18     Basophils Absolute 09/13/2023 0.04     Sodium 09/13/2023 139     Potassium 09/13/2023 3.9     Chloride 09/13/2023 104     CO2 09/13/2023 26     ANION GAP 09/13/2023 9     BUN 09/13/2023 9     Creatinine 09/13/2023 0.81     Glucose, Fasting 09/13/2023 86     Calcium 09/13/2023 9.7     AST 09/13/2023 11 (L)     ALT 09/13/2023 10     Alkaline Phosphatase 09/13/2023 50     Total Protein 09/13/2023 7.8     Albumin 09/13/2023 4.4     Total Bilirubin 09/13/2023 0.85     eGFR 09/13/2023 92     Vitamin B-12 09/13/2023 187     Iron Saturation 09/13/2023 54 (H)     TIBC 09/13/2023 269     Iron 09/13/2023 145     UIBC 09/13/2023 124 (L)     Ferritin 09/13/2023 100      I have personally reviewed all pertinent laboratory/tests results. Suicide/Homicide Risk Assessment:    Risk of Harm to Self:  Protective Factors: no current suicidal ideation, access to mental health treatment, compliant with medications, compliant with mental health treatment, having a desire to be alive, medical compliance, no substance use problems  Based on today's assessment, Elisa Kern presents the following risk of harm to self: minimal    Risk of Harm to Others:  Based on today's assessment, Elisa Kern presents the following risk of harm to others: none    The following interventions are recommended: no intervention changes needed    Medications Risks/Benefits:      Risks, Benefits And Possible Side Effects Of Medications:    Discussed risks and benefits of treatment with patient including risk of suicidality, serotonin syndrome, increased QTc interval and SIADH related to treatment with antidepressants;  Risk of induction of manic symptoms in certain patient populations and risks of misuse, abuse or dependence, sedation and respiratory depression related to treatment with benzodiazepine medications     Controlled Medication Discussion:     Elisa Kern has been filling controlled prescriptions on time as prescribed according to [address] [facility]  Discussed with Elisa Kern the risks of sedation, respiratory depression, impairment of ability to drive and potential for abuse and addiction related to treatment with benzodiazepine medications. She understands risk of treatment with benzodiazepine medications, agrees to not drive if feels impaired and agrees to take medications as prescribed  Discussed with [facility] warning on concurrent use of benzodiazepines and opioid medications including sedation, respiratory depression, coma and death. She understands the risk of treatment with benzodiazepines in addition to opioids and wants to continue taking those medications  Discussed with Enoc Escudero increased risk of impairment related to concurrent use of benzodiazepines and hypnotic medications including excessive sedation, psychomotor impairment and respiratory depression. She understands the risk of treatment with benzodiazepines in addition to hypnotic medications and wants to continue taking those medications    Treatment Plan:    Due for update/Updated:   no  Last treatment plan done 10/19 . Treatment Plan due on 4/19/24. FRANCOIS Rush 11/16/23    This note was shared with patient.       Visit Time    Visit Start Time: 429  Visit Stop Time: 850  Total Visit Duration:  20 minutes

## 2023-11-17 ENCOUNTER — TELEPHONE (OUTPATIENT)
Dept: HEMATOLOGY ONCOLOGY | Facility: CLINIC | Age: 38
End: 2023-11-17

## 2023-11-17 DIAGNOSIS — D50.8 IRON DEFICIENCY ANEMIA FOLLOWING BARIATRIC SURGERY: Primary | ICD-10-CM

## 2023-11-17 DIAGNOSIS — K95.89 IRON DEFICIENCY ANEMIA FOLLOWING BARIATRIC SURGERY: Primary | ICD-10-CM

## 2023-11-20 ENCOUNTER — TELEPHONE (OUTPATIENT)
Dept: HEMATOLOGY ONCOLOGY | Facility: CLINIC | Age: 38
End: 2023-11-20

## 2023-11-20 NOTE — TELEPHONE ENCOUNTER
Called patient to remind her to have her Iron labs drawn before her next infusion. Left message on her voicemail.

## 2023-11-28 ENCOUNTER — TELEPHONE (OUTPATIENT)
Dept: PSYCHIATRY | Facility: CLINIC | Age: 38
End: 2023-11-28

## 2023-11-28 NOTE — TELEPHONE ENCOUNTER
This is a patient of Tai Joseph Maltese started taking Remeron in October. She has developed muscle and joint pain about 8 days ago and it is getting worse. Can you please contact her.

## 2023-11-28 NOTE — TELEPHONE ENCOUNTER
Spoke to Ryland Fink about her flu like symptoms. States they started around 11/20 and have been intermittent. However they are not getting worse and the pain is also in the joints of her fingers. States that she has to type the pain is affecting her. She is requesting recommendation. States many years ago she had some problems with psyh medications as well. Any changes to Mirtazapine? Will refer to Katerina Tejada for review.

## 2023-11-29 NOTE — TELEPHONE ENCOUNTER
LM on Frannie's VM informing her that she could stop her Mirtazapine for a few days to see if symptoms resolve. Instructed her to call back with update. If symptoms do resolve, she will need to schedule an appointment to discuss other medication options. Nursing number provided for call back.

## 2023-12-01 ENCOUNTER — APPOINTMENT (OUTPATIENT)
Dept: LAB | Facility: HOSPITAL | Age: 38
End: 2023-12-01
Payer: COMMERCIAL

## 2023-12-01 DIAGNOSIS — Z90.3 POSTGASTRECTOMY MALABSORPTION: ICD-10-CM

## 2023-12-01 DIAGNOSIS — K91.2 POSTGASTRECTOMY MALABSORPTION: ICD-10-CM

## 2023-12-01 DIAGNOSIS — K95.89 IRON DEFICIENCY ANEMIA FOLLOWING BARIATRIC SURGERY: ICD-10-CM

## 2023-12-01 DIAGNOSIS — D51.9 ANEMIA DUE TO VITAMIN B12 DEFICIENCY, UNSPECIFIED B12 DEFICIENCY TYPE: ICD-10-CM

## 2023-12-01 DIAGNOSIS — D50.8 IRON DEFICIENCY ANEMIA FOLLOWING BARIATRIC SURGERY: ICD-10-CM

## 2023-12-01 DIAGNOSIS — Z90.3 H/O GASTRIC SLEEVE: ICD-10-CM

## 2023-12-01 LAB
ERYTHROCYTE [DISTWIDTH] IN BLOOD BY AUTOMATED COUNT: 12.1 % (ref 11.6–15.1)
FERRITIN SERPL-MCNC: 66 NG/ML (ref 11–307)
HCT VFR BLD AUTO: 44.5 % (ref 34.8–46.1)
HGB BLD-MCNC: 14.5 G/DL (ref 11.5–15.4)
IRON SATN MFR SERPL: 56 % (ref 15–50)
IRON SERPL-MCNC: 169 UG/DL (ref 50–212)
MCH RBC QN AUTO: 29.8 PG (ref 26.8–34.3)
MCHC RBC AUTO-ENTMCNC: 32.6 G/DL (ref 31.4–37.4)
MCV RBC AUTO: 91 FL (ref 82–98)
PLATELET # BLD AUTO: 286 THOUSANDS/UL (ref 149–390)
PMV BLD AUTO: 10.1 FL (ref 8.9–12.7)
RBC # BLD AUTO: 4.87 MILLION/UL (ref 3.81–5.12)
TIBC SERPL-MCNC: 301 UG/DL (ref 250–450)
UIBC SERPL-MCNC: 132 UG/DL (ref 155–355)
VIT B12 SERPL-MCNC: 208 PG/ML (ref 180–914)
WBC # BLD AUTO: 8.23 THOUSAND/UL (ref 4.31–10.16)

## 2023-12-01 PROCEDURE — 82607 VITAMIN B-12: CPT

## 2023-12-01 PROCEDURE — 85027 COMPLETE CBC AUTOMATED: CPT

## 2023-12-01 PROCEDURE — 83550 IRON BINDING TEST: CPT

## 2023-12-01 PROCEDURE — 83540 ASSAY OF IRON: CPT

## 2023-12-01 PROCEDURE — 36415 COLL VENOUS BLD VENIPUNCTURE: CPT

## 2023-12-01 PROCEDURE — 82728 ASSAY OF FERRITIN: CPT

## 2023-12-06 DIAGNOSIS — Z90.3 POSTGASTRECTOMY MALABSORPTION: Primary | ICD-10-CM

## 2023-12-06 DIAGNOSIS — K95.89 IRON DEFICIENCY ANEMIA FOLLOWING BARIATRIC SURGERY: ICD-10-CM

## 2023-12-06 DIAGNOSIS — K91.2 POSTGASTRECTOMY MALABSORPTION: Primary | ICD-10-CM

## 2023-12-06 DIAGNOSIS — D50.8 IRON DEFICIENCY ANEMIA FOLLOWING BARIATRIC SURGERY: ICD-10-CM

## 2023-12-06 DIAGNOSIS — D51.9 ANEMIA DUE TO VITAMIN B12 DEFICIENCY, UNSPECIFIED B12 DEFICIENCY TYPE: ICD-10-CM

## 2023-12-06 DIAGNOSIS — Z90.3 H/O GASTRIC SLEEVE: ICD-10-CM

## 2023-12-06 RX ORDER — SODIUM CHLORIDE 9 MG/ML
20 INJECTION, SOLUTION INTRAVENOUS ONCE
OUTPATIENT
Start: 2023-12-08

## 2023-12-06 RX ORDER — DIPHENHYDRAMINE HCL 25 MG
25 TABLET ORAL ONCE
Start: 2023-12-08 | End: 2023-12-08

## 2023-12-06 RX ORDER — ACETAMINOPHEN 325 MG/1
650 TABLET ORAL ONCE
Start: 2023-12-08 | End: 2023-12-08

## 2023-12-08 ENCOUNTER — HOSPITAL ENCOUNTER (OUTPATIENT)
Dept: INFUSION CENTER | Facility: HOSPITAL | Age: 38
End: 2023-12-08
Attending: INTERNAL MEDICINE

## 2023-12-08 DIAGNOSIS — G47.00 INSOMNIA, UNSPECIFIED TYPE: ICD-10-CM

## 2023-12-08 DIAGNOSIS — F41.1 GENERALIZED ANXIETY DISORDER: ICD-10-CM

## 2023-12-08 RX ORDER — MIRTAZAPINE 7.5 MG/1
7.5 TABLET, FILM COATED ORAL
Qty: 30 TABLET | Refills: 1 | Status: SHIPPED | OUTPATIENT
Start: 2023-12-08

## 2023-12-28 DIAGNOSIS — F41.1 GENERALIZED ANXIETY DISORDER: ICD-10-CM

## 2023-12-28 DIAGNOSIS — G47.00 INSOMNIA, UNSPECIFIED TYPE: ICD-10-CM

## 2023-12-28 RX ORDER — HYDROXYZINE HYDROCHLORIDE 25 MG/1
25 TABLET, FILM COATED ORAL 2 TIMES DAILY PRN
Qty: 90 TABLET | Refills: 0 | Status: SHIPPED | OUTPATIENT
Start: 2023-12-28

## 2024-06-07 DIAGNOSIS — Z00.6 ENCOUNTER FOR EXAMINATION FOR NORMAL COMPARISON OR CONTROL IN CLINICAL RESEARCH PROGRAM: ICD-10-CM

## 2024-07-26 ENCOUNTER — APPOINTMENT (OUTPATIENT)
Dept: LAB | Facility: HOSPITAL | Age: 39
End: 2024-07-26
Payer: COMMERCIAL

## 2024-07-26 DIAGNOSIS — Z00.6 ENCOUNTER FOR EXAMINATION FOR NORMAL COMPARISON OR CONTROL IN CLINICAL RESEARCH PROGRAM: ICD-10-CM

## 2024-07-26 PROCEDURE — 36415 COLL VENOUS BLD VENIPUNCTURE: CPT

## 2024-08-27 ENCOUNTER — TELEPHONE (OUTPATIENT)
Age: 39
End: 2024-08-27

## 2024-08-27 NOTE — TELEPHONE ENCOUNTER
Contacted patient off of [facility]  to verify needs of services in attempts to offer patient an appointment at [place] office with new therapist. LVM for patient to contact intake dept  in regards to wait list

## 2024-09-24 ENCOUNTER — TELEPHONE (OUTPATIENT)
Age: 39
End: 2024-09-24

## 2024-10-21 LAB
APOB+LDLR+PCSK9 GENE MUT ANL BLD/T: NOT DETECTED
BRCA1+BRCA2 DEL+DUP + FULL MUT ANL BLD/T: NOT DETECTED
MLH1+MSH2+MSH6+PMS2 GN DEL+DUP+FUL M: NOT DETECTED

## 2025-02-27 NOTE — RESULT ENCOUNTER NOTE
Please call the patient regarding her abnormal result  B12 and folate levels are normal vitamin-D level is below normal so she needs to take 1000 units of vitamin D3 every day and recheck her vitamin-D level in 6 months C reactive protein which is a been in a m   Marker for inflammation was good cholesterol was elevated at 223 the goal is 200 and the LDL cholesterol is 150 and the goal is 100 or less so she has a little bit of work to do on the lipids thyroid is good chemistry panel is good good kidney function good liver function a blood sugar was 94 Spontaneous, unlabored and symmetrical